# Patient Record
Sex: FEMALE | Race: WHITE | Employment: OTHER | URBAN - METROPOLITAN AREA
[De-identification: names, ages, dates, MRNs, and addresses within clinical notes are randomized per-mention and may not be internally consistent; named-entity substitution may affect disease eponyms.]

---

## 2017-02-08 ENCOUNTER — OFFICE VISIT (OUTPATIENT)
Dept: CARDIOLOGY | Facility: CLINIC | Age: 67
End: 2017-02-08
Attending: INTERNAL MEDICINE
Payer: COMMERCIAL

## 2017-02-08 VITALS
DIASTOLIC BLOOD PRESSURE: 76 MMHG | HEIGHT: 66 IN | WEIGHT: 273.3 LBS | SYSTOLIC BLOOD PRESSURE: 106 MMHG | BODY MASS INDEX: 43.92 KG/M2 | HEART RATE: 68 BPM

## 2017-02-08 DIAGNOSIS — E66.01 MORBID OBESITY, UNSPECIFIED OBESITY TYPE (H): ICD-10-CM

## 2017-02-08 DIAGNOSIS — I25.10 CORONARY ARTERY DISEASE INVOLVING NATIVE CORONARY ARTERY OF NATIVE HEART WITHOUT ANGINA PECTORIS: ICD-10-CM

## 2017-02-08 DIAGNOSIS — E78.2 MIXED HYPERLIPIDEMIA: ICD-10-CM

## 2017-02-08 DIAGNOSIS — E78.5 HYPERLIPIDEMIA LDL GOAL <70: ICD-10-CM

## 2017-02-08 DIAGNOSIS — Z98.890 S/P CAROTID ENDARTERECTOMY: Primary | ICD-10-CM

## 2017-02-08 LAB
CHOLEST SERPL-MCNC: 180 MG/DL
HDLC SERPL-MCNC: 54 MG/DL
LDLC SERPL CALC-MCNC: 83 MG/DL
NONHDLC SERPL-MCNC: 126 MG/DL
TRIGL SERPL-MCNC: 217 MG/DL

## 2017-02-08 PROCEDURE — 36415 COLL VENOUS BLD VENIPUNCTURE: CPT | Performed by: INTERNAL MEDICINE

## 2017-02-08 PROCEDURE — 80061 LIPID PANEL: CPT | Performed by: INTERNAL MEDICINE

## 2017-02-08 PROCEDURE — 99214 OFFICE O/P EST MOD 30 MIN: CPT | Performed by: INTERNAL MEDICINE

## 2017-02-08 NOTE — MR AVS SNAPSHOT
"              After Visit Summary   2/8/2017    Calista Atkinson    MRN: [id number]           Patient Information     Date Of Birth          1950        Visit Information        Provider Department      2/8/2017 10:15 AM Sully Haney DO [facility]        Today's Diagnoses     Hyperlipidemia LDL goal <70         Coronary artery disease involving native coronary artery of native heart without angina pectoris            Follow-ups after your visit        Who to contact     If you have questions or need follow up information about today's clinic visit or your schedule please contact [facility] directly at [phone].  Normal or non-critical lab and imaging results will be communicated to you by MyChart, letter or phone within 4 business days after the clinic has received the results. If you do not hear from us within 7 days, please contact the clinic through [employer] or phone. If you have a critical or abnormal lab result, we will notify you by phone as soon as possible.  Submit refill requests through DLS or call your pharmacy and they will forward the refill request to us. Please allow 3 business days for your refill to be completed.          Additional Information About Your Visit        MyChart Information     DLS gives you secure access to your electronic health record. If you see a primary care provider, you can also send messages to your care team and make appointments. If you have questions, please call your primary care clinic.  If you do not have a primary care provider, please call [phone] and they will assist you.        Care EveryWhere ID     This is your Care EveryWhere ID. This could be used by other organizations to access your [place] medical records  [id number]        Your Vitals Were     Pulse Height BMI (Body Mass Index)             68 1.664 m (5' 5.5\") 44.77 kg/m2          Blood " Pressure from Last 3 Encounters:   02/08/17 106/76   10/27/16 122/76   02/02/16 110/74    Weight from Last 3 Encounters:   02/08/17 123.968 kg (273 lb 4.8 oz)   10/27/16 124.286 kg (274 lb)   02/02/16 124.286 kg (274 lb)              We Performed the Following     Follow-Up with Cardiologist          Today's Medication Changes          These changes are accurate as of: 2/8/17 10:30 AM.  If you have any questions, ask your nurse or doctor.               These medicines have changed or have updated prescriptions.        Dose/Directions    furosemide 40 MG tablet   Commonly known as:  LASIX   This may have changed:    - when to take this  - reasons to take this  - additional instructions   Used for:  HTN (hypertension), CAD (coronary artery disease)        Dose:  40 mg   Take 1 tablet (40 mg) by mouth daily Some days patient takes (2) tabs.   Quantity:  30 tablet   Refills:  1                Primary Care Provider Office Phone # Fax #    Twyla Edna Nick [phone]       [place] [address]  [address]   [facility] [address]        Thank you!     Thank you for choosing [facility] [place]  for your care. Our goal is always to provide you with excellent care. Hearing back from our patients is one way we can continue to improve our services. Please take a few minutes to complete the written survey that you may receive in the mail after your visit with us. Thank you!             Your Updated Medication List - Protect others around you: Learn how to safely use, store and throw away your medicines at www.disposemymeds.org.          This list is accurate as of: 2/8/17 10:30 AM.  Always use your most recent med list.                   Brand Name Dispense Instructions for use    albuterol (2.5 MG/3ML) 0.083% neb solution      Take 1 vial by nebulization every 6 hours as needed for shortness of breath / dyspnea or wheezing       aspirin 81 MG tablet      Take 81  mg by mouth daily       atorvastatin 40 MG tablet    LIPITOR    90 tablet    Take 1 tablet (40 mg) by mouth daily       furosemide 40 MG tablet    LASIX    30 tablet    Take 1 tablet (40 mg) by mouth daily Some days patient takes (2) tabs.       IBUPROFEN IB OR      Take 1,000 mg by mouth daily as needed       lisinopril-hydrochlorothiazide 20-12.5 MG per tablet    PRINZIDE/ZESTORETIC    30 tablet    Take 1 tablet by mouth daily       metoprolol 25 MG tablet    LOPRESSOR    60 tablet    Take 1 tablet (25 mg) by mouth 2 times daily       omeprazole 40 MG capsule    priLOSEC    30 capsule    Take 1 capsule (40 mg) by mouth daily

## 2017-02-08 NOTE — PROGRESS NOTES
HPI and Plan:   See dictation    Orders Placed This Encounter   Procedures     Follow-Up with Cardiologist       No orders of the defined types were placed in this encounter.       Medications Discontinued During This Encounter   Medication Reason     buPROPion (WELLBUTRIN XL) 150 MG 24 hr tablet Not filled/taken by Patient     Cholecalciferol (VITAMIN D) 2000 UNITS CAPS Stopped by Patient         Encounter Diagnoses   Name Primary?     Hyperlipidemia LDL goal <70      Coronary artery disease involving native coronary artery of native heart without angina pectoris      S/P carotid endarterectomy Yes     Morbid obesity, unspecified obesity type (H)        CURRENT MEDICATIONS:  Current Outpatient Prescriptions   Medication Sig Dispense Refill     omeprazole (PRILOSEC) 40 MG capsule Take 1 capsule (40 mg) by mouth daily 30 capsule 3     atorvastatin (LIPITOR) 40 MG tablet Take 1 tablet (40 mg) by mouth daily 90 tablet 2     metoprolol (LOPRESSOR) 25 MG tablet Take 1 tablet (25 mg) by mouth 2 times daily 60 tablet 5     lisinopril-hydrochlorothiazide (PRINZIDE,ZESTORETIC) 20-12.5 MG per tablet Take 1 tablet by mouth daily 30 tablet 10     IBUPROFEN IB OR Take 1,000 mg by mouth daily as needed       furosemide (LASIX) 40 MG tablet Take 1 tablet (40 mg) by mouth daily Some days patient takes (2) tabs. (Patient taking differently: Take 40 mg by mouth as needed (Pt taking on a  PRN basis) Some days patient takes (2) tabs.) 30 tablet 1     aspirin 81 MG tablet Take 81 mg by mouth daily       albuterol (2.5 MG/3ML) 0.083% nebulizer solution Take 1 vial by nebulization every 6 hours as needed for shortness of breath / dyspnea or wheezing         ALLERGIES   No Known Allergies    PAST MEDICAL HISTORY:  Past Medical History   Diagnosis Date     COPD (chronic obstructive pulmonary disease) (H)      Hypertension      Gastro-oesophageal reflux disease      Myocardial infarction (H) 2012     Cellulitis      Hyperlipidemia      CAD  "(coronary artery disease) 6/2012     STEMI and stent      CVA (cerebral infarction) 2012     Carotid artery plaque 1/9/2015     S/P carotid endarterectomy 1/9/2015       PAST SURGICAL HISTORY:  Past Surgical History   Procedure Laterality Date     Angiogram  6/6/12     thrombectomy and stent RCA     Tonsillectomy       age 9     Endarterectomy carotid Left 3/27/12     Heart cath left heart cath  03/02/15     Evidence of old MI, Continued good results of the stent in mid RCA with mild disease elsewhere in the RCA, Disease in very small branch of the distal CFX.        FAMILY HISTORY:  Family History   Problem Relation Age of Onset     Myocardial Infarction Mother      Prostate Cancer Father      CANCER Father      Lymphnode     Cancer - colorectal Paternal Grandfather      Alcohol/Drug Son      Alcoholic     Myocardial Infarction Brother      Respiratory Brother      Pneumonia        SOCIAL HISTORY:  Social History     Social History     Marital Status:      Spouse Name: N/A     Number of Children: N/A     Years of Education: N/A     Social History Main Topics     Smoking status: Current Every Day Smoker     Smokeless tobacco: Never Used      Comment: 4-5 cig daily.      Alcohol Use: No     Drug Use: No     Sexual Activity: No     Other Topics Concern     Caffeine Concern Yes     2-3 cups daily     Sleep Concern Yes     off and on sleeping     Special Diet No     Exercise No     Social History Narrative       Review of Systems:  Skin:  Negative       Eyes:  Positive for glasses    ENT:  Negative      Respiratory:  Positive for shortness of breath;dyspnea on exertion;cough SOB with humidity, and with exertion; coughing due to a cold   Cardiovascular:    Positive for;lightheadedness;fatigue   \"I get lightheaded once in a while in the morning\" - goes away withing 1 min.  Gastroenterology: Positive for reflux;heartburn;excessive gas or bloating heartburn/ reflux under control with RX - per pt  Genitourinary:  " "Positive for nocturia;incontinence;urgency 2-3x per night  Musculoskeletal:  Positive for back pain;nocturnal cramping \"way down in lower back\" , hand and toe cramps occasionally  Neurologic:  Negative      Psychiatric:  Positive for sleep disturbances    Heme/Lymph/Imm:  Positive for night sweats    Endocrine:  Negative        Physical Exam:  Vitals: /76 mmHg  Pulse 68  Ht 1.664 m (5' 5.5\")  Wt 123.968 kg (273 lb 4.8 oz)  BMI 44.77 kg/m2    Constitutional:  cooperative;in no acute distress morbidly obese      Skin:  warm and dry to the touch        Head:  normocephalic        Eyes:  pupils equal and round        ENT:  no pallor or cyanosis        Neck:    bilateral carotid bruit      Chest:  normal symmetry bronchovesicular breath sounds;diminished breath sounds bilaterally        Cardiac: regular rhythm;no murmurs, gallops or rubs detected;normal S1 and S2   distant heart sounds              Abdomen:  abdomen soft;no bruits obese      Vascular:   pulses below the femoral arteries are diminished                                      Extremities and Back:  no deformities, clubbing, cyanosis, erythema observed   1+;bilateral LE edema          Neurological:  no gross motor deficits;affect appropriate, oriented to time, person and place              CC  Sully Haney, DO   [facility]  [address]  [place] [address]                    "

## 2017-02-08 NOTE — PROGRESS NOTES
HISTORY OF PRESENT ILLNESS:  Ms. Atkinson is a pleasant 66-year-old female with a history of coronary disease, previous inferior wall myocardial infarction, peripheral arterial disease with a history of left carotid endarterectomy and ongoing tobacco abuse.  She is suffering from upper respiratory viral illness, currently with productive cough and generalized malaise; that has been ongoing for well for a week now.      From a cardiac perspective; however, she is denying any chest pain symptoms.  Her breathing has actually been really good.  She is not experiencing any symptoms suggestive of orthopnea or PND.  She does continue to smoke up to 5 cigarettes per day.  We did discuss smoking cessation once again with her today.        She had a cholesterol profile drawn today showing continued reduction in her overall cholesterol with moderate intensity Lipitor.  Total cholesterol today was 180, HDL 54, LDL 83, previously had been 109 and triglycerides are still elevated to 217, but down from 248.  She seems to tolerate Lipitor well without any side effect.      PHYSICAL EXAMINATION:  Today her blood pressure is 106/76, pulse is 68.  Weight 273.  This has been stable over the past year.  Body mass index of 44.  Cardiovascular tones are regular.  I do not appreciate a murmur, gallop or rub.  She does have some coarse rhonchi noted in the upper airways anteriorly and diminished in the bases posteriorly.  No peripheral edema is noted on exam today.      In summary, Ms. Atkinson is a pleasant 66-year-old female with a history of coronary disease, previous inferior wall myocardial infarction with preserved LV systolic function, peripheral arterial disease with left carotid endarterectomy and ongoing tobacco abuse.  She is suffering from bronchitis currently, but from a cardiac perspective, she is doing well without chest pain symptoms or dyspnea with exertion.  She is tolerating moderate intensity Lipitor with significant  improvement in her cholesterol numbers.  Her blood pressure is under good control.  We talked about smoking cessation once again today.  She did not need any refills.  I am happy to see her back on an annual basis or as needed.  Please feel free to contact me with any questions you have in regards to her care.      cc:      Twyla Nick MD    [facility]   [address] [place] [address]         LEXIS ELAM DO             D: 2017 10:32   T: 2017 15:40   MT: SHAHBAZ      Name:     GLENIS HENDRICKSON   MRN:      [id number]        Account:      [id number]   :      1950           Service Date: 2017      Document: [id number]

## 2017-02-08 NOTE — LETTER
2/8/2017    Twyla Nick MD  [facility]   [address]  [place] [address]    RE: Calista OMAR Atkinson       Dear Colleague,    I had the pleasure of seeing Calista Carode in the [facility].    Ms. Atkinson is a pleasant 66-year-old female with a history of coronary disease, previous inferior wall myocardial infarction, peripheral arterial disease with a history of left carotid endarterectomy and ongoing tobacco abuse.  She is suffering from upper respiratory viral illness, currently with productive cough and generalized malaise; that has been ongoing for well for a week now.      From a cardiac perspective; however, she is denying any chest pain symptoms.  Her breathing has actually been really good.  She is not experiencing any symptoms suggestive of orthopnea or PND.  She does continue to smoke up to 5 cigarettes per day.  We did discuss smoking cessation once again with her today.        She had a cholesterol profile drawn today showing continued reduction in her overall cholesterol with moderate intensity Lipitor.  Total cholesterol today was 180, HDL 54, LDL 83, previously had been 109 and triglycerides are still elevated to 217, but down from 248.  She seems to tolerate Lipitor well without any side effect.      PHYSICAL EXAMINATION:  Today her blood pressure is 106/76, pulse is 68.  Weight 273.  This has been stable over the past year.  Body mass index of 44.  Cardiovascular tones are regular.  I do not appreciate a murmur, gallop or rub.  She does have some coarse rhonchi noted in the upper airways anteriorly and diminished in the bases posteriorly.  No peripheral edema is noted on exam today.      In summary, Ms. Atkinson is a pleasant 66-year-old female with a history of coronary disease, previous inferior wall myocardial infarction with preserved LV systolic function, peripheral arterial disease with left carotid endarterectomy and ongoing tobacco abuse.   She is suffering from bronchitis currently, but from a cardiac perspective, she is doing well without chest pain symptoms or dyspnea with exertion.  She is tolerating moderate intensity Lipitor with significant improvement in her cholesterol numbers.  Her blood pressure is under good control.  We talked about smoking cessation once again today.  She did not need any refills.  I am happy to see her back on an annual basis or as needed.  Please feel free to contact me with any questions you have in regards to her care.      Again, thank you for allowing me to participate in the care of your patient.      Sincerely,    Sully Haney, DO     [facility]

## 2017-02-24 DIAGNOSIS — K21.9 GASTROESOPHAGEAL REFLUX DISEASE WITHOUT ESOPHAGITIS: ICD-10-CM

## 2017-02-24 RX ORDER — OMEPRAZOLE 40 MG/1
40 CAPSULE, DELAYED RELEASE ORAL DAILY
Qty: 30 CAPSULE | Refills: 0 | Status: SHIPPED | OUTPATIENT
Start: 2017-02-24 | End: 2017-03-03

## 2017-02-24 NOTE — TELEPHONE ENCOUNTER
Medication is being filled for 1 time refill only due to:  Patient needs to be seen because it has been more than one year since last visit. Spoke with patient and appointment scheduled with Dr. Rose 3/3/2017.   Aide Fair RN

## 2017-02-24 NOTE — TELEPHONE ENCOUNTER
omeprazole (PRILOSEC) 40 MG capsule  Last Written Prescription Date: 9/21/16  Last Fill Quantity: 30,  # refills: 3   Last Office Visit with FMOSWALD, PEGGYP or [facility] prescribing provider:  1/12/2016                                              DINESH Armijo

## 2017-03-03 ENCOUNTER — OFFICE VISIT (OUTPATIENT)
Dept: FAMILY MEDICINE | Facility: CLINIC | Age: 67
End: 2017-03-03
Payer: COMMERCIAL

## 2017-03-03 VITALS
TEMPERATURE: 98.1 F | DIASTOLIC BLOOD PRESSURE: 80 MMHG | RESPIRATION RATE: 16 BRPM | HEART RATE: 69 BPM | WEIGHT: 275.2 LBS | BODY MASS INDEX: 45.1 KG/M2 | SYSTOLIC BLOOD PRESSURE: 104 MMHG | OXYGEN SATURATION: 95 %

## 2017-03-03 DIAGNOSIS — Z78.0 ASYMPTOMATIC POSTMENOPAUSAL STATUS: ICD-10-CM

## 2017-03-03 DIAGNOSIS — Z23 NEED FOR INFLUENZA VACCINATION: ICD-10-CM

## 2017-03-03 DIAGNOSIS — F17.200 NEEDS SMOKING CESSATION EDUCATION: ICD-10-CM

## 2017-03-03 DIAGNOSIS — Z12.11 SCREEN FOR COLON CANCER: ICD-10-CM

## 2017-03-03 DIAGNOSIS — Z23 NEED FOR PROPHYLACTIC VACCINATION AGAINST STREPTOCOCCUS PNEUMONIAE (PNEUMOCOCCUS): ICD-10-CM

## 2017-03-03 DIAGNOSIS — J44.9 CHRONIC OBSTRUCTIVE PULMONARY DISEASE, UNSPECIFIED COPD TYPE (H): Primary | ICD-10-CM

## 2017-03-03 DIAGNOSIS — Z11.59 NEED FOR HEPATITIS C SCREENING TEST: ICD-10-CM

## 2017-03-03 DIAGNOSIS — R53.83 FATIGUE, UNSPECIFIED TYPE: ICD-10-CM

## 2017-03-03 DIAGNOSIS — K21.9 GASTROESOPHAGEAL REFLUX DISEASE WITHOUT ESOPHAGITIS: ICD-10-CM

## 2017-03-03 DIAGNOSIS — E55.9 VITAMIN D DEFICIENCY: ICD-10-CM

## 2017-03-03 LAB
FEF 25/75: 1.61
FEV-1: 1.87
FEV1/FVC: NORMAL
FVC: 2.48

## 2017-03-03 PROCEDURE — G0009 ADMIN PNEUMOCOCCAL VACCINE: HCPCS | Performed by: FAMILY MEDICINE

## 2017-03-03 PROCEDURE — 82306 VITAMIN D 25 HYDROXY: CPT | Performed by: FAMILY MEDICINE

## 2017-03-03 PROCEDURE — 36415 COLL VENOUS BLD VENIPUNCTURE: CPT | Performed by: FAMILY MEDICINE

## 2017-03-03 PROCEDURE — 99214 OFFICE O/P EST MOD 30 MIN: CPT | Mod: 25 | Performed by: FAMILY MEDICINE

## 2017-03-03 PROCEDURE — 90732 PPSV23 VACC 2 YRS+ SUBQ/IM: CPT | Performed by: FAMILY MEDICINE

## 2017-03-03 PROCEDURE — 90662 IIV NO PRSV INCREASED AG IM: CPT | Performed by: FAMILY MEDICINE

## 2017-03-03 PROCEDURE — 86803 HEPATITIS C AB TEST: CPT | Performed by: FAMILY MEDICINE

## 2017-03-03 PROCEDURE — 94010 BREATHING CAPACITY TEST: CPT | Performed by: FAMILY MEDICINE

## 2017-03-03 RX ORDER — ALBUTEROL SULFATE 90 UG/1
2 AEROSOL, METERED RESPIRATORY (INHALATION) EVERY 6 HOURS PRN
Qty: 1 INHALER | Refills: 3 | Status: SHIPPED | OUTPATIENT
Start: 2017-03-03

## 2017-03-03 RX ORDER — OMEPRAZOLE 40 MG/1
40 CAPSULE, DELAYED RELEASE ORAL DAILY
Qty: 90 CAPSULE | Refills: 3 | Status: SHIPPED | OUTPATIENT
Start: 2017-03-03 | End: 2018-03-09

## 2017-03-03 RX ORDER — ALBUTEROL SULFATE 0.83 MG/ML
1 SOLUTION RESPIRATORY (INHALATION) EVERY 6 HOURS PRN
Qty: 360 ML | Refills: 0 | Status: SHIPPED | OUTPATIENT
Start: 2017-03-03

## 2017-03-03 RX ORDER — TIOTROPIUM BROMIDE 18 UG/1
CAPSULE ORAL; RESPIRATORY (INHALATION)
Qty: 30 CAPSULE | Refills: 3 | Status: SHIPPED | OUTPATIENT
Start: 2017-03-03 | End: 2018-01-16 | Stop reason: ALTCHOICE

## 2017-03-03 ASSESSMENT — PAIN SCALES - GENERAL: PAINLEVEL: NO PAIN (0)

## 2017-03-03 NOTE — MR AVS SNAPSHOT
After Visit Summary   3/3/2017    Calista Atkinson    MRN: [id number]           Patient Information     Date Of Birth          1950        Visit Information        Provider Department      3/3/2017 9:20 AM Twyla Nick MD [facility]        Today's Diagnoses     Chronic obstructive pulmonary disease, unspecified COPD type (H)    -  1    Gastroesophageal reflux disease without esophagitis        Fatigue, unspecified type        Screen for colon cancer        Asymptomatic postmenopausal status        Need for hepatitis C screening test        Need for prophylactic vaccination against Streptococcus pneumoniae (pneumococcus)        Needs smoking cessation education        Need for influenza vaccination        Vitamin D deficiency           Follow-ups after your visit        Who to contact     If you have questions or need follow up information about today's clinic visit or your schedule please contact [facility] directly at [phone].  Normal or non-critical lab and imaging results will be communicated to you by MyChart, letter or phone within 4 business days after the clinic has received the results. If you do not hear from us within 7 days, please contact the clinic through MyChart or phone. If you have a critical or abnormal lab result, we will notify you by phone as soon as possible.  Submit refill requests through [employer] or call your pharmacy and they will forward the refill request to us. Please allow 3 business days for your refill to be completed.          Additional Information About Your Visit        MyChart Information     [employer] gives you secure access to your electronic health record. If you see a primary care provider, you can also send messages to your care team and make appointments. If you have questions, please call your primary care clinic.  If you do not have a primary care provider, please call [phone] and they will assist you.         Care EveryWhere ID     This is your Care EveryWhere ID. This could be used by other organizations to access your [place] medical records  [id number]        Your Vitals Were     Pulse Temperature Respirations Pulse Oximetry BMI (Body Mass Index)       69 98.1  F (36.7  C) (Oral) 16 95% 45.1 kg/m2        Blood Pressure from Last 3 Encounters:   03/03/17 104/80   02/08/17 106/76   10/27/16 122/76    Weight from Last 3 Encounters:   03/03/17 275 lb 3.2 oz (124.8 kg)   02/08/17 273 lb 4.8 oz (124 kg)   10/27/16 274 lb (124.3 kg)              We Performed the Following     CBC with platelets     Comprehensive metabolic panel     FLU VACCINE, INCREASED ANTIGEN, PRESV FREE     Hepatitis C Screen Reflex to HCV RNA Quant and Genotype     Magnesium     PNEUMOCOCCAL VACCINE,ADULT,SQ OR IM     Spirometry, Breathing Capacity: Normal Order, Clinic Performed     Vitamin B12     Vitamin D Deficiency          Today's Medication Changes          These changes are accurate as of: 3/3/17 11:59 PM.  If you have any questions, ask your nurse or doctor.               Start taking these medicines.        Dose/Directions    tiotropium 18 MCG capsule   Commonly known as:  SPIRIVA HANDIHALER   Used for:  Chronic obstructive pulmonary disease, unspecified COPD type (H)   Started by:  Twyla Nick MD        Inhale contents of one capsule daily.   Quantity:  30 capsule   Refills:  3         These medicines have changed or have updated prescriptions.        Dose/Directions    * albuterol (2.5 MG/3ML) 0.083% neb solution   This may have changed:  Another medication with the same name was added. Make sure you understand how and when to take each.   Used for:  Chronic obstructive pulmonary disease, unspecified COPD type (H)   Changed by:  Twyla Nick MD        Dose:  1 vial   Take 1 vial (2.5 mg) by nebulization every 6 hours as needed for shortness of breath / dyspnea or wheezing   Quantity:  360 mL   Refills:  0        * albuterol 108 (90 BASE) MCG/ACT Inhaler   Commonly known as:  PROAIR HFA/PROVENTIL HFA/VENTOLIN HFA   This may have changed:  You were already taking a medication with the same name, and this prescription was added. Make sure you understand how and when to take each.   Used for:  Chronic obstructive pulmonary disease, unspecified COPD type (H)   Changed by:  Twyla Nick MD        Dose:  2 puff   Inhale 2 puffs into the lungs every 6 hours as needed for shortness of breath / dyspnea or wheezing   Quantity:  1 Inhaler   Refills:  3       furosemide 40 MG tablet   Commonly known as:  LASIX   This may have changed:    - when to take this  - reasons to take this  - additional instructions   Used for:  HTN (hypertension), CAD (coronary artery disease)        Dose:  40 mg   Take 1 tablet (40 mg) by mouth daily Some days patient takes (2) tabs.   Quantity:  30 tablet   Refills:  1       * Notice:  This list has 2 medication(s) that are the same as other medications prescribed for you. Read the directions carefully, and ask your doctor or other care provider to review them with you.         Where to get your medicines      These medications were sent to [facility] [address] [place] [address] [facility]  [address] [place] [address]     Phone:  [phone]     albuterol (2.5 MG/3ML) 0.083% neb solution    albuterol 108 (90 BASE) MCG/ACT Inhaler    omeprazole 40 MG capsule    tiotropium 18 MCG capsule                Primary Care Provider Office Phone # Fax #    Twyla Nick [phone]       [facility] [address]  [address]   [facility] [address]        Thank you!     Thank you for choosing [facility]  for your care. Our goal is always to provide you with excellent care. Hearing back from our patients is one way we can continue to improve our services. Please take a few minutes to complete the written survey that you may receive in the mail  after your visit with us. Thank you!             Your Updated Medication List - Protect others around you: Learn how to safely use, store and throw away your medicines at www.disposemymeds.org.          This list is accurate as of: 3/3/17 11:59 PM.  Always use your most recent med list.                   Brand Name Dispense Instructions for use    * albuterol (2.5 MG/3ML) 0.083% neb solution     360 mL    Take 1 vial (2.5 mg) by nebulization every 6 hours as needed for shortness of breath / dyspnea or wheezing       * albuterol 108 (90 BASE) MCG/ACT Inhaler    PROAIR HFA/PROVENTIL HFA/VENTOLIN HFA    1 Inhaler    Inhale 2 puffs into the lungs every 6 hours as needed for shortness of breath / dyspnea or wheezing       aspirin 81 MG tablet      Take 81 mg by mouth daily       atorvastatin 40 MG tablet    LIPITOR    90 tablet    Take 1 tablet (40 mg) by mouth daily       furosemide 40 MG tablet    LASIX    30 tablet    Take 1 tablet (40 mg) by mouth daily Some days patient takes (2) tabs.       IBUPROFEN IB OR      Take 1,000 mg by mouth daily as needed       lisinopril-hydrochlorothiazide 20-12.5 MG per tablet    PRINZIDE/ZESTORETIC    30 tablet    Take 1 tablet by mouth daily       metoprolol 25 MG tablet    LOPRESSOR    60 tablet    Take 1 tablet (25 mg) by mouth 2 times daily       omeprazole 40 MG capsule    priLOSEC    90 capsule    Take 1 capsule (40 mg) by mouth daily       tiotropium 18 MCG capsule    SPIRIVA HANDIHALER    30 capsule    Inhale contents of one capsule daily.       * Notice:  This list has 2 medication(s) that are the same as other medications prescribed for you. Read the directions carefully, and ask your doctor or other care provider to review them with you.

## 2017-03-03 NOTE — NURSING NOTE
"Chief Complaint   Patient presents with     Recheck Medication     prilosec     Gastrointestinal Problem     COPD     Initial /80 (BP Location: Right arm, Patient Position: Chair, Cuff Size: Adult Regular)  Pulse 69  Temp 98.1  F (36.7  C) (Oral)  Resp 16  Wt 275 lb 3.2 oz (124.8 kg)  SpO2 95%  BMI 45.1 kg/m2 Estimated body mass index is 45.1 kg/(m^2) as calculated from the following:    Height as of 2/8/17: 5' 5.5\" (1.664 m).    Weight as of this encounter: 275 lb 3.2 oz (124.8 kg).  BP completed using cuff size regular right arm.    Lisa Magill, CMA    "

## 2017-03-03 NOTE — NURSING NOTE
Screening Questionnaire for Adult Immunization    Are you sick today?   No   Do you have allergies to medications, food, a vaccine component or latex?   No   Have you ever had a serious reaction after receiving a vaccination?   No   Do you have a long-term health problem with heart disease, lung disease, asthma, kidney disease, metabolic disease (e.g. diabetes), anemia, or other blood disorder?   Yes-had a stroke and heart attack in the past.  Has COPD.    Do you have cancer, leukemia, HIV/AIDS, or any other immune system problem?   No   In the past 3 months, have you taken medications that affect  your immune system, such as prednisone, other steroids, or anticancer drugs; drugs for the treatment of rheumatoid arthritis, Crohn s disease, or psoriasis; or have you had radiation treatments?   No   Have you had a seizure, or a brain or other nervous system problem?   No   During the past year, have you received a transfusion of blood or blood     products, or been given immune (gamma) globulin or antiviral drug?   No   For women: Are you pregnant or is there a chance you could become        pregnant during the next month?   No   Have you received any vaccinations in the past 4 weeks?   No     Immunization questionnaire was positive for at least one answer.  Notified Sandoval.      MNVFC doesn't apply on this patient    Per orders of Dr. Nick, injection of pneumonia 23 given by Lisa A. Magill. Patient instructed to remain in clinic for 20 minutes afterwards, and to report any adverse reaction to me immediately.       Screening performed by Lisa A. Magill on 3/3/2017 at 10:39 AM.

## 2017-03-03 NOTE — PROGRESS NOTES
HPI   SUBJECTIVE:                                                    Calista Atkinson is a 66 year old female who presents to clinic today for the following health issues:      Hypertension Follow-up      Outpatient blood pressures are not being checked.    Low Salt Diet: no added salt    BP Readings from Last 6 Encounters:   17 104/80   17 106/76   10/27/16 122/76   16 110/74   16 100/80   06/18/15 118/68     Is still on the same lisinopril from when she initially started on BP medication. Does note of some dizziness and wooziness for a minute for two in the morning, but will most often go away quickly.      COPD Follow-Up    Symptoms are currently: improved    Current fatigue or dyspnea with ambulation: stable     Shortness of breath: stable    Increased or change in Cough/Sputum: Yes-  Just got over a cold, so still has a cough     Fever(s): Yes-  A couple weeks ago    Baseline ambulation without stopping to rest 1-2 feet, blocks. Able to walk up 1 flights of stairs without stopping to rest.    Any ER/UC or hospital admissions since your last visit? No     History   Smoking Status     Current Every Day Smoker   Smokeless Tobacco     Never Used     Comment: 4-5 cig daily.      No results found for: FEV1, AXM3SUX    Recently getting over cold. Does have a nebulizer at home, thinks albuterol may be . Has not been filled for two years. Still smoking, down from 1.5 pack to 4 cigarettes a day. Slowly trying to wean off. Did do spirometry 2 years ago.        Amount of exercise or physical activity: None    Problems taking medications regularly: No    Medication side effects: none  Diet: regular (no restrictions)  GERD/Heartburn     Onset: YEARS     Description:     Burning in chest: no    Intensity: mild, 0/10    Progression of Symptoms: same and constant    Accompanying Signs & Symptoms:  Does it feel like food gets stuck: no  Nausea: no  Vomiting (bloody?): no  Abdominal Pain:  no  Black-Tarry stools: no:  Bloody stools: no   History:   Previous ulcers: no    Precipitating factors:   Caffeine use: YES- IF PATIENT DRINKS A LOT OF CAFFEINE.  Only drinks 3 cups a day   Alcohol use: none  NSAID/Aspirin use: YES- patient takes a daily ASA 81 mg.  1000 mg Ibuprofen twice a week for lower back pain  Tobacco use: YES  Worse with spicy foods. Acid foods     Alleviating factors:  prilosec          Therapies Tried and outcome:lifestyle change including: avoiding spicy, caffeinated, alcohol and acid foods and prilosec     Prilosec is still working. Notes that if she misses a day the next day she will notice symptoms the next day. Is taking 2000 units of vitamin D daily and also taking magnesium.      PROBLEMS TO ADD ON...  Cardiology: Stable, no issues.   Lasix: on hand, but has not had to take. Reports this has is due to decreased use of caffeine. Drinks 2 cups of coffee in the morning, one in the afternoon.       Problem list and histories reviewed & adjusted, as indicated.  Additional history: as documented    Recent Labs   Lab Test  02/08/17   0922  02/02/16   0934  06/08/15   0751  02/25/15   1410  01/09/15   1204  12/23/14   0800   LDL  83  109*  172*   --   139*   --    HDL  54  57  52   --    --    --    TRIG  217*  248*  272*   --    --    --    ALT   --    --   17   --   16  21   CR   --    --    --   0.56  0.62  0.59   GFRESTIMATED   --    --    --   >90  Non  GFR Calc    >90  Non  GFR Calc    >90  Non  GFR Calc     GFRESTBLACK   --    --    --   >90   GFR Calc    >90   GFR Calc    >90   GFR Calc     POTASSIUM   --    --    --   4.2  4.3  4.0   TSH   --    --    --    --   2.03   --       BP Readings from Last 3 Encounters:   03/03/17 104/80   02/08/17 106/76   10/27/16 122/76    Wt Readings from Last 3 Encounters:   03/03/17 124.8 kg (275 lb 3.2 oz)   02/08/17 124 kg (273 lb 4.8 oz)   10/27/16  124.3 kg (274 lb)                  Labs reviewed in EPIC    Reviewed and updated as needed this visit by clinical staff  Tobacco  Allergies  Meds  Problems  Med Hx  Surg Hx  Fam Hx  Soc Hx        Reviewed and updated as needed this visit by Provider         ROS:  Constitutional, HEENT, cardiovascular, pulmonary, gi and gu systems are negative, except as otherwise noted.    This document serves as a record of the services and decisions personally performed and made by Twyla Nick MD. It was created on her behalf by Sharon Leonard, a trained medical scribe. The creation of this document is based the provider's statements to the medical scribe.  Sharon Leonard March 3, 2017 10:06 AM    OBJECTIVE:                                                    /80 (BP Location: Right arm, Patient Position: Chair, Cuff Size: Adult Regular)  Pulse 69  Temp 98.1  F (36.7  C) (Oral)  Resp 16  Wt 124.8 kg (275 lb 3.2 oz)  SpO2 95%  BMI 45.1 kg/m2  Body mass index is 45.1 kg/(m^2).  GENERAL: healthy, alert and no distress  EYES: Eyes grossly normal to inspection, PERRL and conjunctivae and sclerae normal  HENT: ear canals and TM's normal, nose and mouth without ulcers or lesions  NECK: no adenopathy, no asymmetry, masses, or scars and thyroid normal to palpation  RESP: lungs clear to auscultation - no rales, rhonchi or wheezes  CV: regular rate and rhythm, normal S1 S2, no S3 or S4, no murmur, click or rub, no peripheral edema and peripheral pulses strong  MS: no gross musculoskeletal defects noted, no edema  SKIN: no suspicious lesions or rashes  NEURO: Normal strength and tone, mentation intact and speech normal  PSYCH: mentation appears normal, affect normal/bright    Diagnostic Test Results:  Results for orders placed or performed in visit on 03/03/17 (from the past 24 hour(s))   Spirometry, Breathing Capacity: Normal Order, Clinic Performed   Result Value Ref Range    FEV-1 1.87     FVC 2.48     FEV1/FVC 76%     FEF  25/75 1.61         ASSESSMENT/PLAN:                                                    (J44.9) Chronic obstructive pulmonary disease, unspecified COPD type (H)  (primary encounter diagnosis)  Comment: spirometry completed normal. Inhalers given. Follow up if spiriva is not covered. Also refilled nebs   Plan: albuterol (2.5 MG/3ML) 0.083% neb solution,         albuterol (PROAIR HFA/PROVENTIL HFA/VENTOLIN         HFA) 108 (90 BASE) MCG/ACT Inhaler, tiotropium         (SPIRIVA HANDIHALER) 18 MCG capsule,         Spirometry, Breathing Capacity: Normal Order,         Clinic Performed        Follow up in six months.     (K21.9) Gastroesophageal reflux disease without esophagitis  Comment: prilosec refilled. Advised going every other day or cutting in half if possible. Discussed vitamin absorption issues with long term use.   Plan: omeprazole (PRILOSEC) 40 MG capsule        Follow up in six months.     (R53.83) Fatigue, unspecified type  Comment: labs.   Plan: Magnesium, Vitamin B12, Iron and iron binding         capacity, Ferritin, CBC with platelets,         Comprehensive metabolic panel        Follow up as needed.     (Z12.11) Screen for colon cancer  Comment: screen.   Plan: Fecal colorectal cancer screen FIT - Future         (S+30)            (Z78.0) Asymptomatic postmenopausal status  Comment: screen  Plan: DEXA HIP/PELVIS/SPINE - Future          (Z11.59) Need for hepatitis C screening test  Comment: screen  Plan: Hepatitis C Screen Reflex to HCV RNA Quant and         Genotype            (Z23) Need for prophylactic vaccination against Streptococcus pneumoniae (pneumococcus)  Comment: agreed to vaccine  Plan: PNEUMOCOCCAL VACCINE,ADULT,SQ OR IM            (F17.200) Needs smoking cessation education  Comment: Declined medication help. Tapering on her own.   Plan: Follow up as needed.     (Z23) Need for influenza vaccination  Comment: agreed to flu vaccine.   Plan: NOS FLU VACC, 1 YR and > IM (Medicare), FLU          VACCINE, INCREASED ANTIGEN, PRESV FREE            (E55.9) Vitamin D deficiency  Comment: Will check vit D. Continue with 2000 units daily.  Plan: Vitamin D Deficiency        Follow up as needed        The information in this document, created by the medical scribe for me, accurately reflects the services I personally performed and the decisions made by me. I have reviewed and approved this document for accuracy prior to leaving the patient care area.  Twyla Nick MD 10:06 AM 3/3/2017          Twyla Nick MD  [facility]      Physical Exam

## 2017-03-04 LAB
DEPRECATED CALCIDIOL+CALCIFEROL SERPL-MC: 32 UG/L (ref 20–75)
HCV AB SERPL QL IA: NORMAL

## 2017-03-08 PROCEDURE — 82274 ASSAY TEST FOR BLOOD FECAL: CPT | Performed by: FAMILY MEDICINE

## 2017-03-09 DIAGNOSIS — Z12.11 SCREEN FOR COLON CANCER: ICD-10-CM

## 2017-03-09 LAB — HEMOCCULT STL QL IA: NEGATIVE

## 2017-03-24 DIAGNOSIS — I10 HTN (HYPERTENSION): Primary | ICD-10-CM

## 2017-03-24 RX ORDER — LISINOPRIL AND HYDROCHLOROTHIAZIDE 12.5; 2 MG/1; MG/1
1 TABLET ORAL DAILY
Qty: 30 TABLET | Refills: 0 | Status: SHIPPED | OUTPATIENT
Start: 2017-03-24 | End: 2017-05-04

## 2017-03-24 NOTE — TELEPHONE ENCOUNTER
lisinopril-hydrochlorothiazide (PRINZIDE,ZESTORETIC) 20-12.5 MG per tablet      Last Written Prescription Date: 2/17/16  Last Fill Quantity: 30, # refills: 10  Last Office Visit with G, [facility] or [facility] prescribing provider: 3/3/2017         Potassium   Date Value Ref Range Status   02/25/2015 4.2 3.4 - 5.3 mmol/L Final     Creatinine   Date Value Ref Range Status   02/25/2015 0.56 0.52 - 1.04 mg/dL Final     BP Readings from Last 3 Encounters:   03/03/17 104/80   02/08/17 106/76   10/27/16 122/76         DINESH Armijo  March 24, 2017  8:50 AM

## 2017-03-24 NOTE — LETTER
Clinics[facility]  March 24, 2017      [place] [address]           Phone :  [phone]          Fax:  [phone]  Calista OMAR Atkinson  [address] [place] [id number]      Dear Calista,    We recently received a call from your pharmacy requesting a refill of your medication-Lisinopril/HCTZ.    A review of your chart indicates that an appointment is required with the lab for yearly blood pressure blood work. Please call the clinic at [phone] to schedule your lab only appointment.    We have authorized one refill of your medication to allow time for you to schedule your appointment.    Taking care of your health is important to us, and ongoing visits with your provider are vital to your care.  We look forward to seeing you in the near future.        Sincerely,        Twyla Nick MD / Aide Fair RN

## 2017-03-24 NOTE — TELEPHONE ENCOUNTER
Medication is being filled for 1 time refill only due to:  Patient needs labs potassium and creatinine. Letter sent to patient.   Aide Fair RN

## 2017-04-10 DIAGNOSIS — I10 ESSENTIAL HYPERTENSION: ICD-10-CM

## 2017-04-10 NOTE — TELEPHONE ENCOUNTER
metoprolol (LOPRESSOR) 25 MG tablet      Last Written Prescription Date: 4/21/16  Last Fill Quantity: 60, # refills: 5    Last Office Visit with G, P or [facility] prescribing provider:  3/3/2017     Future Office Visit:        BP Readings from Last 3 Encounters:   03/03/17 104/80   02/08/17 106/76   10/27/16 122/76       DINESH Armijo  April 10, 2017  9:51 AM

## 2017-04-11 RX ORDER — METOPROLOL TARTRATE 25 MG/1
25 TABLET, FILM COATED ORAL 2 TIMES DAILY
Qty: 60 TABLET | Refills: 5 | Status: SHIPPED | OUTPATIENT
Start: 2017-04-11 | End: 2017-09-26

## 2017-04-18 DIAGNOSIS — R53.83 FATIGUE, UNSPECIFIED TYPE: ICD-10-CM

## 2017-04-18 LAB
ALBUMIN SERPL-MCNC: 3.4 G/DL (ref 3.4–5)
ALP SERPL-CCNC: 88 U/L (ref 40–150)
ALT SERPL W P-5'-P-CCNC: 14 U/L (ref 0–50)
ANION GAP SERPL CALCULATED.3IONS-SCNC: 8 MMOL/L (ref 3–14)
AST SERPL W P-5'-P-CCNC: 12 U/L (ref 0–45)
BILIRUB SERPL-MCNC: 0.8 MG/DL (ref 0.2–1.3)
BUN SERPL-MCNC: 11 MG/DL (ref 7–30)
CALCIUM SERPL-MCNC: 9.1 MG/DL (ref 8.5–10.1)
CHLORIDE SERPL-SCNC: 103 MMOL/L (ref 94–109)
CO2 SERPL-SCNC: 29 MMOL/L (ref 20–32)
CREAT SERPL-MCNC: 0.66 MG/DL (ref 0.52–1.04)
ERYTHROCYTE [DISTWIDTH] IN BLOOD BY AUTOMATED COUNT: 13 % (ref 10–15)
GFR SERPL CREATININE-BSD FRML MDRD: 89 ML/MIN/1.7M2
GLUCOSE SERPL-MCNC: 89 MG/DL (ref 70–99)
HCT VFR BLD AUTO: 44.1 % (ref 35–47)
HGB BLD-MCNC: 15.2 G/DL (ref 11.7–15.7)
MAGNESIUM SERPL-MCNC: 2.2 MG/DL (ref 1.6–2.3)
MCH RBC QN AUTO: 30.3 PG (ref 26.5–33)
MCHC RBC AUTO-ENTMCNC: 34.5 G/DL (ref 31.5–36.5)
MCV RBC AUTO: 88 FL (ref 78–100)
PLATELET # BLD AUTO: 180 10E9/L (ref 150–450)
POTASSIUM SERPL-SCNC: 4 MMOL/L (ref 3.4–5.3)
PROT SERPL-MCNC: 6.7 G/DL (ref 6.8–8.8)
RBC # BLD AUTO: 5.02 10E12/L (ref 3.8–5.2)
SODIUM SERPL-SCNC: 140 MMOL/L (ref 133–144)
VIT B12 SERPL-MCNC: 368 PG/ML (ref 193–986)
WBC # BLD AUTO: 7.1 10E9/L (ref 4–11)

## 2017-04-18 PROCEDURE — 83735 ASSAY OF MAGNESIUM: CPT | Performed by: FAMILY MEDICINE

## 2017-04-18 PROCEDURE — 36415 COLL VENOUS BLD VENIPUNCTURE: CPT | Performed by: FAMILY MEDICINE

## 2017-04-18 PROCEDURE — 82607 VITAMIN B-12: CPT | Performed by: FAMILY MEDICINE

## 2017-04-18 PROCEDURE — 85027 COMPLETE CBC AUTOMATED: CPT | Performed by: FAMILY MEDICINE

## 2017-04-18 PROCEDURE — 80053 COMPREHEN METABOLIC PANEL: CPT | Performed by: FAMILY MEDICINE

## 2017-05-04 DIAGNOSIS — I10 HTN (HYPERTENSION): ICD-10-CM

## 2017-05-05 RX ORDER — LISINOPRIL AND HYDROCHLOROTHIAZIDE 12.5; 2 MG/1; MG/1
TABLET ORAL
Qty: 30 TABLET | Refills: 10 | Status: SHIPPED | OUTPATIENT
Start: 2017-05-05 | End: 2017-09-26

## 2017-05-05 NOTE — TELEPHONE ENCOUNTER
lisinopril-hydrochlorothiazide (PRINZIDE/ZESTORETIC) 20-12.5 MG per tablet      Last Written Prescription Date: 3/24/17  Last Fill Quantity: 30, # refills: 0  Last Office Visit with G, [facility] or [facility] prescribing provider: 3/3/2017         Potassium   Date Value Ref Range Status   04/18/2017 4.0 3.4 - 5.3 mmol/L Final     Creatinine   Date Value Ref Range Status   04/18/2017 0.66 0.52 - 1.04 mg/dL Final     BP Readings from Last 3 Encounters:   03/03/17 104/80   02/08/17 106/76   10/27/16 122/76         DINESH Armijo  May 5, 2017  8:16 AM

## 2017-05-05 NOTE — TELEPHONE ENCOUNTER
Prescription approved per [facility] Refill Protocol.  Aide Fair RN

## 2017-07-12 DIAGNOSIS — E78.2 MIXED HYPERLIPIDEMIA: ICD-10-CM

## 2017-07-12 RX ORDER — ATORVASTATIN CALCIUM 40 MG/1
40 TABLET, FILM COATED ORAL DAILY
Qty: 90 TABLET | Refills: 2 | Status: SHIPPED | OUTPATIENT
Start: 2017-07-12 | End: 2018-07-10

## 2017-09-26 DIAGNOSIS — I10 HTN (HYPERTENSION): Primary | ICD-10-CM

## 2017-09-26 DIAGNOSIS — I10 ESSENTIAL HYPERTENSION: ICD-10-CM

## 2017-09-26 RX ORDER — METOPROLOL TARTRATE 25 MG/1
25 TABLET, FILM COATED ORAL 2 TIMES DAILY
Qty: 180 TABLET | Refills: 1 | Status: SHIPPED | OUTPATIENT
Start: 2017-09-26 | End: 2018-10-15

## 2017-09-26 RX ORDER — LISINOPRIL AND HYDROCHLOROTHIAZIDE 12.5; 2 MG/1; MG/1
1 TABLET ORAL DAILY
Qty: 90 TABLET | Refills: 1 | Status: SHIPPED | OUTPATIENT
Start: 2017-09-26 | End: 2018-06-02

## 2017-09-26 NOTE — TELEPHONE ENCOUNTER
Prescription approved per [facility] Refill Protocol.    Deann BOWERS RN, BSN, PHN  [place] Flex RN

## 2017-09-26 NOTE — TELEPHONE ENCOUNTER
Pharm is requesting a 90 day supply of BOTH meds.    lisinopril-hydrochlorothiazide (PRINZIDE/ZESTORETIC) 20-12.5 MG per tablet      Last Written Prescription Date: 5/5/17  Last Fill Quantity: 30, # refills: 10  Last Office Visit with [facility], [facility] or  Health prescribing provider: 3/3/2017         Potassium   Date Value Ref Range Status   04/18/2017 4.0 3.4 - 5.3 mmol/L Final     Creatinine   Date Value Ref Range Status   04/18/2017 0.66 0.52 - 1.04 mg/dL Final     BP Readings from Last 3 Encounters:   03/03/17 104/80   02/08/17 106/76   10/27/16 122/76     ________________________________________________________________________________    metoprolol (LOPRESSOR) 25 MG tablet     Sig: Take 1 tablet (25 mg) by mouth 2 times daily     Last Written Prescription Date: 4/11/17  Last Fill Quantity: 60, # refills: 5    Last Office Visit with [facility], [facility] or  Health prescribing provider:  3/3/2017    BP Readings from Last 3 Encounters:   03/03/17 104/80   02/08/17 106/76   10/27/16 122/76         DINESH Armijo  September 26, 2017  2:04 PM

## 2017-10-12 ENCOUNTER — HOSPITAL ENCOUNTER (OUTPATIENT)
Dept: ULTRASOUND IMAGING | Facility: CLINIC | Age: 67
Discharge: HOME | End: 2017-10-12
Attending: SURGERY | Admitting: SURGERY
Payer: MEDICARE

## 2017-10-12 ENCOUNTER — OFFICE VISIT (OUTPATIENT)
Dept: SURGERY | Facility: CLINIC | Age: 67
End: 2017-10-12
Attending: SURGERY
Payer: COMMERCIAL

## 2017-10-12 VITALS
OXYGEN SATURATION: 94 % | HEART RATE: 76 BPM | SYSTOLIC BLOOD PRESSURE: 108 MMHG | DIASTOLIC BLOOD PRESSURE: 70 MMHG | HEIGHT: 66 IN | WEIGHT: 275 LBS | BODY MASS INDEX: 44.2 KG/M2

## 2017-10-12 DIAGNOSIS — Z72.0 TOBACCO ABUSE: ICD-10-CM

## 2017-10-12 DIAGNOSIS — I65.29 CAROTID ARTERY STENOSIS: ICD-10-CM

## 2017-10-12 DIAGNOSIS — I65.22 RECURRENT CAROTID STENOSIS, LEFT: Primary | ICD-10-CM

## 2017-10-12 PROCEDURE — 99213 OFFICE O/P EST LOW 20 MIN: CPT | Performed by: SURGERY

## 2017-10-12 PROCEDURE — 93882 EXTRACRANIAL UNI/LTD STUDY: CPT | Mod: LT

## 2017-10-12 NOTE — MR AVS SNAPSHOT
After Visit Summary   10/12/2017    Calista Atkinson    MRN: [id number]           Patient Information     Date Of Birth          1950        Visit Information        Provider Department      10/12/2017 1:00 PM Devaughn Robledo MD [facility]      Today's Diagnoses     Recurrent carotid stenosis, left    -  1    Tobacco abuse           Follow-ups after your visit        Follow-up notes from your care team     Return in about 2 years (around 10/12/2019) for Bilateral carotid ultrasonography.      Who to contact     If you have questions or need follow up information about today's clinic visit or your schedule please contact [facility] directly at [phone].  Normal or non-critical lab and imaging results will be communicated to you by MyChart, letter or phone within 4 business days after the clinic has received the results. If you do not hear from us within 7 days, please contact the clinic through [email or web address] or phone. If you have a critical or abnormal lab result, we will notify you by phone as soon as possible.  Submit refill requests through Kobo or call your pharmacy and they will forward the refill request to us. Please allow 3 business days for your refill to be completed.          Additional Information About Your Visit        MyChart Information     Kobo gives you secure access to your electronic health record. If you see a primary care provider, you can also send messages to your care team and make appointments. If you have questions, please call your primary care clinic.  If you do not have a primary care provider, please call [phone] and they will assist you.        Care EveryWhere ID     This is your Care EveryWhere ID. This could be used by other organizations to access your [facility] medical records  [id number]        Your Vitals Were     Pulse Height Pulse Oximetry BMI (Body Mass Index)        "   76 5' 5.5\" (1.664 m) 94% 45.07 kg/m2         Blood Pressure from Last 3 Encounters:   10/12/17 108/70   03/03/17 104/80   02/08/17 106/76    Weight from Last 3 Encounters:   10/12/17 275 lb (124.7 kg)   03/03/17 275 lb 3.2 oz (124.8 kg)   02/08/17 273 lb 4.8 oz (124 kg)              Today, you had the following     No orders found for display         Today's Medication Changes          These changes are accurate as of: 10/12/17  1:15 PM.  If you have any questions, ask your nurse or doctor.               These medicines have changed or have updated prescriptions.        Dose/Directions    furosemide 40 MG tablet   Commonly known as:  LASIX   This may have changed:    - when to take this  - reasons to take this  - additional instructions   Used for:  HTN (hypertension), CAD (coronary artery disease)        Dose:  40 mg   Take 1 tablet (40 mg) by mouth daily Some days patient takes (2) tabs.   Quantity:  30 tablet   Refills:  1                Primary Care Provider Office Phone # Fax #    Twyla Edna Nick [phone]       [id number]  [address]   [facility] [id number]        Equal Access to Services     GEMA SANCHEZ AH: Hadii ray ponce hadasho Soomaali, waaxda luqadaha, qaybta kaalmada adeegyada, rojas moscoso. So [facility] [phone].    ATENCIÓN: Si habla español, tiene a alberto disposición servicios gratuitos de asistencia lingüística. Llame al [phone].    We comply with applicable federal civil rights laws and [place] laws. We do not discriminate on the basis of race, color, national origin, age, disability, sex, sexual orientation, or gender identity.            Thank you!     Thank you for choosing [facility]  for your care. Our goal is always to provide you with excellent care. Hearing back from our patients is one way we can continue to improve our services. Please take a few minutes to complete the written survey that you may receive in the " mail after your visit with us. Thank you!             Your Updated Medication List - Protect others around you: Learn how to safely use, store and throw away your medicines at www.disposemymeds.org.          This list is accurate as of: 10/12/17  1:15 PM.  Always use your most recent med list.                   Brand Name Dispense Instructions for use Diagnosis    * albuterol (2.5 MG/3ML) 0.083% neb solution     360 mL    Take 1 vial (2.5 mg) by nebulization every 6 hours as needed for shortness of breath / dyspnea or wheezing    Chronic obstructive pulmonary disease, unspecified COPD type (H)       * albuterol 108 (90 BASE) MCG/ACT Inhaler    PROAIR HFA/PROVENTIL HFA/VENTOLIN HFA    1 Inhaler    Inhale 2 puffs into the lungs every 6 hours as needed for shortness of breath / dyspnea or wheezing    Chronic obstructive pulmonary disease, unspecified COPD type (H)       aspirin 81 MG tablet      Take 81 mg by mouth daily        atorvastatin 40 MG tablet    LIPITOR    90 tablet    Take 1 tablet (40 mg) by mouth daily    Mixed hyperlipidemia       furosemide 40 MG tablet    LASIX    30 tablet    Take 1 tablet (40 mg) by mouth daily Some days patient takes (2) tabs.    HTN (hypertension), CAD (coronary artery disease)       IBUPROFEN IB OR      Take 1,000 mg by mouth daily as needed        lisinopril-hydrochlorothiazide 20-12.5 MG per tablet    PRINZIDE/ZESTORETIC    90 tablet    Take 1 tablet by mouth daily    HTN (hypertension)       metoprolol 25 MG tablet    LOPRESSOR    180 tablet    Take 1 tablet (25 mg) by mouth 2 times daily    Essential hypertension       omeprazole 40 MG capsule    priLOSEC    90 capsule    Take 1 capsule (40 mg) by mouth daily    Gastroesophageal reflux disease without esophagitis       tiotropium 18 MCG capsule    SPIRIVA HANDIHALER    30 capsule    Inhale contents of one capsule daily.    Chronic obstructive pulmonary disease, unspecified COPD type (H)       * Notice:  This list has 2  medication(s) that are the same as other medications prescribed for you. Read the directions carefully, and ask your doctor or other care provider to review them with you.

## 2017-10-12 NOTE — LETTER
[facility] [address]  [place] [address]  Phone: [phone]  Fax: [phone]    2017    Re: Calista Atkinson : 1950    Calista Atkinson is a 67-year-old female with hypertension, hyperlipidemia, and tobacco abuse who is status post left carotid endarterectomy performed at an outside institution in .  She has minimal right sided disease.  She presents today for annual left carotid ultrasound.  She has weaned herself down to 3 cigarettes per day and hopes to be completely tobacco free by this winter.  She denies stroke, symptoms of TIA or amaurosis over the past year.     Exam:  Pleasant, obese female in no acute distress.  Well-healed left carotid endarterectomy scar.  2+ palpable radial pulses bilaterally.  Neurologic exam nonfocal.     Imaging:     HISTORY:  67-year-old female status post left carotid endarterectomy  in .      COMPARISON: Ultrasound dated 10/27/2016.      FINDINGS:   Left carotid artery: There is soft plaque in the distal common carotid  artery/carotid bifurcation.      The peak systolic and diastolic velocities in the left internal  carotid artery are 96 and 36 cm/s, respectively, versus 112 and 46  cm/s on the prior exam. Per sonographic criteria, degree of stenosis  in the left internal carotid artery is less than 50%.      Flow in the left vertebral artery is antegrade.      IMPRESSION: Sonographic findings suggest a less than 50% stenosis in  the left internal carotid artery.     ASSESSMENT:  1.  5 years status post left carotid endarterectomy with mild, stable recurrent left carotid stenosis of less than 50%.     2.  Tobacco abuse     PLAN:  I reviewed all the above with Calista stressing the importance of absolute tobacco cessation. She will continue her medical regimen which includes daily aspirin.  Her carotid disease has been stable and unchanged.  I will see her back again in 2 years for repeat bilateral carotid  ultrasonography.     Edy Robledo M.D.

## 2017-10-23 NOTE — PROGRESS NOTES
Calista Atkinson is a 67-year-old female with hypertension, hyperlipidemia, and tobacco abuse who is status post left carotid endarterectomy performed at an outside institution in 2012.  She has minimal right sided disease.  She presents today for annual left carotid ultrasound.  She has weaned herself down to 3 cigarettes per day and hopes to be completely tobacco free by this winter.  She denies stroke, symptoms of TIA or amaurosis over the past year.    Exam:  Pleasant, obese female in no acute distress.  Well-healed left carotid endarterectomy scar.  2+ palpable radial pulses bilaterally.  Neurologic exam nonfocal.    Imaging:    HISTORY:  67-year-old female status post left carotid endarterectomy  in 2012.     COMPARISON: Ultrasound dated 10/27/2016.     FINDINGS:   Left carotid artery: There is soft plaque in the distal common carotid  artery/carotid bifurcation.     The peak systolic and diastolic velocities in the left internal  carotid artery are 96 and 36 cm/s, respectively, versus 112 and 46  cm/s on the prior exam. Per sonographic criteria, degree of stenosis  in the left internal carotid artery is less than 50%.     Flow in the left vertebral artery is antegrade.         IMPRESSION: Sonographic findings suggest a less than 50% stenosis in  the left internal carotid artery.    ASSESSMENT:  1.  5 years status post left carotid endarterectomy with mild, stable recurrent left carotid stenosis of less than 50%.    2.  Tobacco abuse    PLAN:  I reviewed all the above with Calista stressing the importance of absolute tobacco cessation.  She will continue her medical regimen which includes daily aspirin.  Her carotid disease has been stable and unchanged.  I will see her back again in 2 years for repeat bilateral carotid ultrasonography.    Total face-to-face time was 15 minutes, greater than 50% spent providing counseling and education.    Edy Robledo M.D.  
stated

## 2018-01-16 ENCOUNTER — RADIANT APPOINTMENT (OUTPATIENT)
Dept: GENERAL RADIOLOGY | Facility: CLINIC | Age: 68
End: 2018-01-16
Attending: FAMILY MEDICINE
Payer: COMMERCIAL

## 2018-01-16 ENCOUNTER — OFFICE VISIT (OUTPATIENT)
Dept: FAMILY MEDICINE | Facility: CLINIC | Age: 68
End: 2018-01-16
Payer: COMMERCIAL

## 2018-01-16 VITALS
HEART RATE: 76 BPM | DIASTOLIC BLOOD PRESSURE: 76 MMHG | OXYGEN SATURATION: 97 % | SYSTOLIC BLOOD PRESSURE: 112 MMHG | BODY MASS INDEX: 45 KG/M2 | HEIGHT: 66 IN | WEIGHT: 280 LBS | TEMPERATURE: 98.4 F | RESPIRATION RATE: 28 BRPM

## 2018-01-16 DIAGNOSIS — J06.9 VIRAL URI WITH COUGH: ICD-10-CM

## 2018-01-16 DIAGNOSIS — Z72.0 TOBACCO ABUSE DISORDER: ICD-10-CM

## 2018-01-16 DIAGNOSIS — J44.9 CHRONIC OBSTRUCTIVE PULMONARY DISEASE, UNSPECIFIED COPD TYPE (H): ICD-10-CM

## 2018-01-16 DIAGNOSIS — J20.9 ACUTE BRONCHITIS, UNSPECIFIED ORGANISM: Primary | ICD-10-CM

## 2018-01-16 LAB
FLUAV+FLUBV AG SPEC QL: NEGATIVE
FLUAV+FLUBV AG SPEC QL: NEGATIVE
SPECIMEN SOURCE: NORMAL

## 2018-01-16 PROCEDURE — 71046 X-RAY EXAM CHEST 2 VIEWS: CPT | Mod: FY

## 2018-01-16 PROCEDURE — 99214 OFFICE O/P EST MOD 30 MIN: CPT | Performed by: FAMILY MEDICINE

## 2018-01-16 PROCEDURE — 87804 INFLUENZA ASSAY W/OPTIC: CPT | Performed by: FAMILY MEDICINE

## 2018-01-16 RX ORDER — LEVOFLOXACIN 750 MG/1
750 TABLET, FILM COATED ORAL DAILY
Qty: 10 TABLET | Refills: 0 | Status: SHIPPED | OUTPATIENT
Start: 2018-01-16 | End: 2018-02-05

## 2018-01-16 RX ORDER — CODEINE PHOSPHATE AND GUAIFENESIN 10; 100 MG/5ML; MG/5ML
1 SOLUTION ORAL EVERY 4 HOURS PRN
Qty: 120 ML | Refills: 0 | Status: SHIPPED | OUTPATIENT
Start: 2018-01-16 | End: 2018-02-05

## 2018-01-16 ASSESSMENT — PAIN SCALES - GENERAL: PAINLEVEL: EXTREME PAIN (9)

## 2018-01-16 NOTE — MR AVS SNAPSHOT
After Visit Summary   1/16/2018    Calista Atkinson    MRN: [id number]           Patient Information     Date Of Birth          1950        Visit Information        Provider Department      1/16/2018 9:40 AM Twyla Nick MD [facility]        Today's Diagnoses     Pneumonia due to infectious organism, unspecified laterality, unspecified part of lung    -  1       Follow-ups after your visit        Follow-up notes from your care team     Return in about 2 days (around 1/18/2018).      Your next 10 appointments already scheduled     Feb 05, 2018 10:45 AM CST   Return Visit with Sully Hanye DO   [facility] ([facility])    [address] [address] [address] [phone]              Who to contact     If you have questions or need follow up information about today's clinic visit or your schedule please contact [facility] directly at [phone].  Normal or non-critical lab and imaging results will be communicated to you by Ohmconnecthart, letter or phone within 4 business days after the clinic has received the results. If you do not hear from us within 7 days, please contact the clinic through PipelineDBt or phone. If you have a critical or abnormal lab result, we will notify you by phone as soon as possible.  Submit refill requests through Analyze Re or call your pharmacy and they will forward the refill request to us. Please allow 3 business days for your refill to be completed.          Additional Information About Your Visit        MyChart Information     Analyze Re gives you secure access to your electronic health record. If you see a primary care provider, you can also send messages to your care team and make appointments. If you have questions, please call your primary care clinic.  If you do not have a primary care provider, please call [phone] and they will assist you.       "  Care EveryWhere ID     This is your Care EveryWhere ID. This could be used by other organizations to access your [place] medical records  [id number]        Your Vitals Were     Pulse Temperature Respirations Height Pulse Oximetry BMI (Body Mass Index)    76 98.4  F (36.9  C) (Oral) 28 5' 5.5\" (1.664 m) 97% 45.89 kg/m2       Blood Pressure from Last 3 Encounters:   01/16/18 112/76   10/12/17 108/70   03/03/17 104/80    Weight from Last 3 Encounters:   01/16/18 280 lb (127 kg)   10/12/17 275 lb (124.7 kg)   03/03/17 275 lb 3.2 oz (124.8 kg)              We Performed the Following     Influenza A/B antigen          Today's Medication Changes          These changes are accurate as of: 1/16/18 10:21 AM.  If you have any questions, ask your nurse or doctor.               Start taking these medicines.        Dose/Directions    guaiFENesin-codeine 100-10 MG/5ML Soln solution   Commonly known as:  ROBITUSSIN AC   Used for:  Pneumonia due to infectious organism, unspecified laterality, unspecified part of lung   Started by:  Twyla Nick MD        Dose:  1 tsp.   Take 5 mLs by mouth every 4 hours as needed for cough   Quantity:  120 mL   Refills:  0       levofloxacin 750 MG tablet   Commonly known as:  LEVAQUIN   Used for:  Pneumonia due to infectious organism, unspecified laterality, unspecified part of lung   Started by:  Twyla Nick MD        Dose:  750 mg   Take 1 tablet (750 mg) by mouth daily   Quantity:  10 tablet   Refills:  0         These medicines have changed or have updated prescriptions.        Dose/Directions    furosemide 40 MG tablet   Commonly known as:  LASIX   This may have changed:    - when to take this  - reasons to take this  - additional instructions   Used for:  HTN (hypertension), CAD (coronary artery disease)        Dose:  40 mg   Take 1 tablet (40 mg) by mouth daily Some days patient takes (2) tabs.   Quantity:  30 tablet   Refills:  1         Stop taking these " medicines if you haven't already. Please contact your care team if you have questions.     tiotropium 18 MCG capsule   Commonly known as:  SPIRIVA HANDIHALER   Stopped by:  Twyla Nick MD                Where to get your medicines      These medications were sent to [facility] [address] [place], [place] - [address] [facility]  [address] [facility] [address]     Phone:  [phone]     levofloxacin 750 MG tablet         Some of these will need a paper prescription and others can be bought over the counter.  Ask your nurse if you have questions.     Bring a paper prescription for each of these medications     guaiFENesin-codeine 100-10 MG/5ML Soln solution                Primary Care Provider Office Phone # Fax #    Twyla Nick [phone]  [address]   [facility] [address]        Equal Access to Services     [facility]: Hadii aad ku hadasho Soomaali, waaxda luqadaha, qaybta kaalmada adeegyada, waxay idiin hayaan adelexi poon . So [facility] [phone].    ATENCIÓN: Si habla español, tiene a alberto disposición servicios gratuitos de asistencia lingüística. Llame al [phone].    We comply with applicable federal civil rights laws and [place] laws. We do not discriminate on the basis of race, color, national origin, age, disability, sex, sexual orientation, or gender identity.            Thank you!     Thank you for choosing [facility]  for your care. Our goal is always to provide you with excellent care. Hearing back from our patients is one way we can continue to improve our services. Please take a few minutes to complete the written survey that you may receive in the mail after your visit with us. Thank you!             Your Updated Medication List - Protect others around you: Learn how to safely use, store and throw away your medicines at www.disposemymeds.org.          This list is accurate as of: 1/16/18 10:21 AM.  Always use  your most recent med list.                   Brand Name Dispense Instructions for use Diagnosis    * albuterol (2.5 MG/3ML) 0.083% neb solution     360 mL    Take 1 vial (2.5 mg) by nebulization every 6 hours as needed for shortness of breath / dyspnea or wheezing    Chronic obstructive pulmonary disease, unspecified COPD type (H)       * albuterol 108 (90 BASE) MCG/ACT Inhaler    PROAIR HFA/PROVENTIL HFA/VENTOLIN HFA    1 Inhaler    Inhale 2 puffs into the lungs every 6 hours as needed for shortness of breath / dyspnea or wheezing    Chronic obstructive pulmonary disease, unspecified COPD type (H)       aspirin 81 MG tablet      Take 81 mg by mouth daily        atorvastatin 40 MG tablet    LIPITOR    90 tablet    Take 1 tablet (40 mg) by mouth daily    Mixed hyperlipidemia       furosemide 40 MG tablet    LASIX    30 tablet    Take 1 tablet (40 mg) by mouth daily Some days patient takes (2) tabs.    HTN (hypertension), CAD (coronary artery disease)       guaiFENesin-codeine 100-10 MG/5ML Soln solution    ROBITUSSIN AC    120 mL    Take 5 mLs by mouth every 4 hours as needed for cough    Pneumonia due to infectious organism, unspecified laterality, unspecified part of lung       IBUPROFEN IB OR      Take 1,000 mg by mouth daily as needed        levofloxacin 750 MG tablet    LEVAQUIN    10 tablet    Take 1 tablet (750 mg) by mouth daily    Pneumonia due to infectious organism, unspecified laterality, unspecified part of lung       lisinopril-hydrochlorothiazide 20-12.5 MG per tablet    PRINZIDE/ZESTORETIC    90 tablet    Take 1 tablet by mouth daily    HTN (hypertension)       metoprolol tartrate 25 MG tablet    LOPRESSOR    180 tablet    Take 1 tablet (25 mg) by mouth 2 times daily    Essential hypertension       omeprazole 40 MG capsule    priLOSEC    90 capsule    Take 1 capsule (40 mg) by mouth daily    Gastroesophageal reflux disease without esophagitis       * Notice:  This list has 2 medication(s) that are the  same as other medications prescribed for you. Read the directions carefully, and ask your doctor or other care provider to review them with you.

## 2018-01-16 NOTE — PROGRESS NOTES
HPI   SUBJECTIVE:   Calista Atkinson is a 67 year old female who presents to clinic today for the following health issues:    Acute Illness   Acute illness concerns: fever  Onset: 1 week ago    Fever: YES    Chills/Sweats: YES    Headache (location?): YES    Sinus Pressure:no    Conjunctivitis:  no    Ear Pain: no    Rhinorrhea: YES- a little bit     Congestion: YES- chest    Sore Throat: no     Cough: YES-productive of yellow sputum    Wheeze: YES    Decreased Appetite: YES    Nausea: no    Vomiting: no    Diarrhea:  no    Dysuria/Freq.: no    Fatigue/Achiness: YES- both    Sick/Strep Exposure: no     Therapies Tried and outcome: neb treatments, ibuprofen 500 mg every 4 hours and cough syrup and nite time tylenol-has helped symptoms.     Patient has been sick for a week. She could feel she was getting something like a cold.She has been experiencing body aches, fevers, chills, coughing, wheezing and a headache. Symptoms worsened yesterday. Patient had very bad fevers and chills last night. She did not take her temp but could feel it in her eyes and mouth- extreme dry mouth.  Has been experiencing lower back pain. Has a difficult time laying down and getting up. No sore throat or ear ache. Mild rhinitis. Cough is productive with yellow sputum. She did not receive the flu shot this year. She has been taking ibuprofen 500 mg daily, no tylenol. She has not eaten anything for 2 days, has only been drinking water. The last time she was this sick was 2-3 years ago when she had pneumonia. Patient reports that she is going on a [place] 2/9/2018.     Of note, patient has COPD. Has been using nebulizer 4x yesterday and today. Has been taking robitussin DM but then switched to Tylenol nite time because she has not been able to sleep.     Tobacco use  She has not smoked any cigarettes for 3 days because she has not felt like it. She is going to try to keep it up to quit.    Problem list and histories reviewed & adjusted, as  "indicated.  Additional history: as documented    BP Readings from Last 3 Encounters:   01/16/18 112/76   10/12/17 108/70   03/03/17 104/80    Wt Readings from Last 3 Encounters:   01/16/18 127 kg (280 lb)   10/12/17 124.7 kg (275 lb)   03/03/17 124.8 kg (275 lb 3.2 oz)         Labs reviewed in EPIC    Reviewed and updated as needed this visit by clinical staffTobacco  Allergies  Meds  Problems  Med Hx  Surg Hx  Fam Hx  Soc Hx        Reviewed and updated as needed this visit by Provider       ROS:  Constitutional, HEENT, cardiovascular, pulmonary, gi and gu systems are negative, except as otherwise noted.    Constitutional: POSITIVE for fever and chills  HEENT: POSITIVE for headache  RESP: POSITIVE for productive cough  GI: POSITIVE for decreased appetite  MUSC: POSITIVE for body aches and low back pain    This document serves as a record of the services and decisions personally performed and made by Twyla Nick MD. It was created on her behalf by Donita Elliott, a trained medical scribe. The creation of this document is based on the provider's statements to the medical scribe.  Dontia Elliott January 16, 2018 9:47 AM     OBJECTIVE:     /76 (BP Location: Right arm, Patient Position: Chair, Cuff Size: Adult Large)  Pulse 76  Temp 98.4  F (36.9  C) (Oral)  Resp 28  Ht 1.664 m (5' 5.5\")  Wt 127 kg (280 lb)  SpO2 97%  BMI 45.89 kg/m2  Body mass index is 45.89 kg/(m^2).    GENERAL: healthy, alert and no distress, coughing during appointment , has a mask on  EYES: Eyes grossly normal to inspection, and conjunctivae and sclerae normal  HENT: ear canals and TM's normal, nose and mouth without ulcers or lesions  NECK: no adenopathy, no asymmetry, masses, or scars and thyroid normal to palpation  RESP: lungs clear to auscultation - no rales, rhonchi , occassional wheezes  CV: regular rate and rhythm, normal S1 S2, no S3 or S4, no murmur, click or rub, MS: no gross musculoskeletal defects noted,   SKIN: " no suspicious lesions or rashes  NEURO: Normal strength and tone, mentation intact and speech normal  PSYCH: mentation appears normal, affect normal/bright    Diagnostic Test Results:  Xray - no pneumonia, negative for the flu    ASSESSMENT/PLAN:   Acute bronchitis due to infectious organism, unspecified laterality, unspecified part of lung  (primary encounter diagnosis)  Comment:   Influenza test negative. Will send chest x-ray to radiologist for interpretation. Suspect pneumonia. Continue using nebs every 4-6 hours. Will start Levaquin, take once a day for 10 days. Discussed that tendonitis is a rare but possible side effect of Levaquin.   Plan: XR Chest 2 Views, Influenza A/B antigen,         levofloxacin (LEVAQUIN) 750 MG tablet,         guaiFENesin-codeine (ROBITUSSIN AC) 100-10         MG/5ML SOLN solution          Continue with not smoking. Encouraged and congratulated, pt wanting to stop on her own and does not plan on starting again    COPD-increase concerns for complications from the bronchitis, neb 3-4 timnes daily, take the abx, do not smoke and Follow up if worsening in the next few days, rather then improving    Follow up if symptoms do not improve or worsen.        The information in this document, created by the medical scribe for me, accurately reflects the services I personally performed and the decisions made by me. I have reviewed and approved this document for accuracy prior to leaving the patient care area.  January 16, 2018 9:47 AM    Twyla Nick MD  [facility]      Physical Exam

## 2018-01-16 NOTE — NURSING NOTE
"Chief Complaint   Patient presents with     URI     Initial /76 (BP Location: Right arm, Patient Position: Chair, Cuff Size: Adult Large)  Pulse 76  Temp 98.4  F (36.9  C) (Oral)  Resp 28  Ht 5' 5.5\" (1.664 m)  Wt 280 lb (127 kg)  SpO2 97%  BMI 45.89 kg/m2 Estimated body mass index is 45.89 kg/(m^2) as calculated from the following:    Height as of this encounter: 5' 5.5\" (1.664 m).    Weight as of this encounter: 280 lb (127 kg).  BP completed using cuff size large right arm.    Lisa Magill, CMA    "

## 2018-01-30 ENCOUNTER — PRE VISIT (OUTPATIENT)
Dept: CARDIOLOGY | Facility: CLINIC | Age: 68
End: 2018-01-30

## 2018-02-05 ENCOUNTER — OFFICE VISIT (OUTPATIENT)
Dept: CARDIOLOGY | Facility: CLINIC | Age: 68
End: 2018-02-05
Attending: INTERNAL MEDICINE
Payer: COMMERCIAL

## 2018-02-05 VITALS
BODY MASS INDEX: 45.06 KG/M2 | SYSTOLIC BLOOD PRESSURE: 116 MMHG | HEART RATE: 88 BPM | WEIGHT: 280.4 LBS | DIASTOLIC BLOOD PRESSURE: 80 MMHG | HEIGHT: 66 IN

## 2018-02-05 DIAGNOSIS — Z98.890 S/P CAROTID ENDARTERECTOMY: ICD-10-CM

## 2018-02-05 DIAGNOSIS — E78.5 HYPERLIPIDEMIA LDL GOAL <70: ICD-10-CM

## 2018-02-05 DIAGNOSIS — I25.10 CORONARY ARTERY DISEASE INVOLVING NATIVE CORONARY ARTERY OF NATIVE HEART WITHOUT ANGINA PECTORIS: ICD-10-CM

## 2018-02-05 DIAGNOSIS — E66.01 MORBID OBESITY, UNSPECIFIED OBESITY TYPE (H): ICD-10-CM

## 2018-02-05 PROCEDURE — 99213 OFFICE O/P EST LOW 20 MIN: CPT | Performed by: INTERNAL MEDICINE

## 2018-02-05 NOTE — MR AVS SNAPSHOT
After Visit Summary   2/5/2018    Calista Atkinson    MRN: [id number]           Patient Information     Date Of Birth          1950        Visit Information        Provider Department      2/5/2018 10:45 AM Sully Haney DO [facility]        Today's Diagnoses     Hyperlipidemia LDL goal <70        Coronary artery disease involving native coronary artery of native heart without angina pectoris        S/P carotid endarterectomy        Morbid obesity, unspecified obesity type (H)           Follow-ups after your visit        Who to contact     If you have questions or need follow up information about today's clinic visit or your schedule please contact [facility] directly at [phone].  Normal or non-critical lab and imaging results will be communicated to you by RadarFindhart, letter or phone within 4 business days after the clinic has received the results. If you do not hear from us within 7 days, please contact the clinic through RadarFindhart or phone. If you have a critical or abnormal lab result, we will notify you by phone as soon as possible.  Submit refill requests through FinanzCheck or call your pharmacy and they will forward the refill request to us. Please allow 3 business days for your refill to be completed.          Additional Information About Your Visit        MyChart Information     FinanzCheck gives you secure access to your electronic health record. If you see a primary care provider, you can also send messages to your care team and make appointments. If you have questions, please call your primary care clinic.  If you do not have a primary care provider, please call [phone] and they will assist you.        Care EveryWhere ID     This is your Care EveryWhere ID. This could be used by other organizations to access your [place] medical records  [id number]        Your Vitals Were     Pulse  "Height Breastfeeding? BMI (Body Mass Index)          88 1.664 m (5' 5.5\") No 45.95 kg/m2         Blood Pressure from Last 3 Encounters:   02/05/18 116/80   01/16/18 112/76   10/12/17 108/70    Weight from Last 3 Encounters:   02/05/18 127.2 kg (280 lb 6.4 oz)   01/16/18 127 kg (280 lb)   10/12/17 124.7 kg (275 lb)              We Performed the Following     Follow-Up with Cardiologist          Today's Medication Changes          These changes are accurate as of 2/5/18 10:54 AM.  If you have any questions, ask your nurse or doctor.               These medicines have changed or have updated prescriptions.        Dose/Directions    furosemide 40 MG tablet   Commonly known as:  LASIX   This may have changed:    - when to take this  - reasons to take this  - additional instructions   Used for:  HTN (hypertension), CAD (coronary artery disease)        Dose:  40 mg   Take 1 tablet (40 mg) by mouth daily Some days patient takes (2) tabs.   Quantity:  30 tablet   Refills:  1                Primary Care Provider Office Phone # Fax #    Twyla Edna Nick [phone]       [id number]  [address]   [facility] [address]        Equal Access to Services     [facility]: Hadii ray ponce hadasho Soomaali, waaxda luqadaha, qaybta kaalmada adeegyada, rojas moscoso. So [facility] [phone].    ATENCIÓN: Si habla español, tiene a alberto disposición servicios gratuitos de asistencia lingüística. Llame al [phone].    We comply with applicable federal civil rights laws and [place] laws. We do not discriminate on the basis of race, color, national origin, age, disability, sex, sexual orientation, or gender identity.            Thank you!     Thank you for choosing [facility]  for your care. Our goal is always to provide you with excellent care. Hearing back from our patients is one way we can continue to improve our services. Please take a few minutes " to complete the written survey that you may receive in the mail after your visit with us. Thank you!             Your Updated Medication List - Protect others around you: Learn how to safely use, store and throw away your medicines at www.disposemymeds.org.          This list is accurate as of 2/5/18 10:54 AM.  Always use your most recent med list.                   Brand Name Dispense Instructions for use Diagnosis    * albuterol (2.5 MG/3ML) 0.083% neb solution     360 mL    Take 1 vial (2.5 mg) by nebulization every 6 hours as needed for shortness of breath / dyspnea or wheezing    Chronic obstructive pulmonary disease, unspecified COPD type (H)       * albuterol 108 (90 BASE) MCG/ACT Inhaler    PROAIR HFA/PROVENTIL HFA/VENTOLIN HFA    1 Inhaler    Inhale 2 puffs into the lungs every 6 hours as needed for shortness of breath / dyspnea or wheezing    Chronic obstructive pulmonary disease, unspecified COPD type (H)       aspirin 81 MG tablet      Take 81 mg by mouth daily        atorvastatin 40 MG tablet    LIPITOR    90 tablet    Take 1 tablet (40 mg) by mouth daily    Mixed hyperlipidemia       furosemide 40 MG tablet    LASIX    30 tablet    Take 1 tablet (40 mg) by mouth daily Some days patient takes (2) tabs.    HTN (hypertension), CAD (coronary artery disease)       IBUPROFEN IB OR      Take 1,000 mg by mouth daily as needed        lisinopril-hydrochlorothiazide 20-12.5 MG per tablet    PRINZIDE/ZESTORETIC    90 tablet    Take 1 tablet by mouth daily    HTN (hypertension)       metoprolol tartrate 25 MG tablet    LOPRESSOR    180 tablet    Take 1 tablet (25 mg) by mouth 2 times daily    Essential hypertension       omeprazole 40 MG capsule    priLOSEC    90 capsule    Take 1 capsule (40 mg) by mouth daily    Gastroesophageal reflux disease without esophagitis       * Notice:  This list has 2 medication(s) that are the same as other medications prescribed for you. Read the directions carefully, and ask your  doctor or other care provider to review them with you.

## 2018-02-05 NOTE — PROGRESS NOTES
HPI and Plan:   See dictation    No orders of the defined types were placed in this encounter.      No orders of the defined types were placed in this encounter.      Medications Discontinued During This Encounter   Medication Reason     guaiFENesin-codeine (ROBITUSSIN AC) 100-10 MG/5ML SOLN solution Therapy completed     levofloxacin (LEVAQUIN) 750 MG tablet Therapy completed         Encounter Diagnoses   Name Primary?     Hyperlipidemia LDL goal <70      Coronary artery disease involving native coronary artery of native heart without angina pectoris      S/P carotid endarterectomy      Morbid obesity, unspecified obesity type (H)        CURRENT MEDICATIONS:  Current Outpatient Prescriptions   Medication Sig Dispense Refill     lisinopril-hydrochlorothiazide (PRINZIDE/ZESTORETIC) 20-12.5 MG per tablet Take 1 tablet by mouth daily 90 tablet 1     metoprolol (LOPRESSOR) 25 MG tablet Take 1 tablet (25 mg) by mouth 2 times daily 180 tablet 1     atorvastatin (LIPITOR) 40 MG tablet Take 1 tablet (40 mg) by mouth daily 90 tablet 2     omeprazole (PRILOSEC) 40 MG capsule Take 1 capsule (40 mg) by mouth daily 90 capsule 3     albuterol (2.5 MG/3ML) 0.083% neb solution Take 1 vial (2.5 mg) by nebulization every 6 hours as needed for shortness of breath / dyspnea or wheezing 360 mL 0     albuterol (PROAIR HFA/PROVENTIL HFA/VENTOLIN HFA) 108 (90 BASE) MCG/ACT Inhaler Inhale 2 puffs into the lungs every 6 hours as needed for shortness of breath / dyspnea or wheezing 1 Inhaler 3     IBUPROFEN IB OR Take 1,000 mg by mouth daily as needed       furosemide (LASIX) 40 MG tablet Take 1 tablet (40 mg) by mouth daily Some days patient takes (2) tabs. (Patient taking differently: Take 40 mg by mouth as needed (Pt taking on a  PRN basis) Some days patient takes (2) tabs.) 30 tablet 1     aspirin 81 MG tablet Take 81 mg by mouth daily         ALLERGIES   No Known Allergies    PAST MEDICAL HISTORY:  Past Medical History:   Diagnosis Date      CAD (coronary artery disease) 6/2012    STEMI and stent      Carotid artery plaque 1/9/2015     Cellulitis      COPD (chronic obstructive pulmonary disease) (H)      CVA (cerebral infarction) 2012     Gastro-oesophageal reflux disease      Hyperlipidemia      Hypertension      Myocardial infarction 2012     S/P carotid endarterectomy 1/9/2015       PAST SURGICAL HISTORY:  Past Surgical History:   Procedure Laterality Date     ANGIOGRAM  6/6/12    thrombectomy and stent RCA     ENDARTERECTOMY CAROTID Left 3/27/12     HEART CATH LEFT HEART CATH  03/02/15    Evidence of old MI, Continued good results of the stent in mid RCA with mild disease elsewhere in the RCA, Disease in very small branch of the distal CFX.      TONSILLECTOMY      age 9       FAMILY HISTORY:  Family History   Problem Relation Age of Onset     Myocardial Infarction Mother      Prostate Cancer Father      CANCER Father      Lymphnode     Cancer - colorectal Paternal Grandfather      Alcohol/Drug Son      Alcoholic     Myocardial Infarction Brother      Respiratory Brother      Pneumonia        SOCIAL HISTORY:  Social History     Social History     Marital status:      Spouse name: N/A     Number of children: N/A     Years of education: N/A     Social History Main Topics     Smoking status: Current Every Day Smoker     Packs/day: 0.50     Types: Cigarettes     Smokeless tobacco: Never Used      Comment: 4-5 cig daily.      Alcohol use No     Drug use: No     Sexual activity: No     Other Topics Concern     Caffeine Concern Yes     2-3 cups daily     Sleep Concern Yes     off and on sleeping     Special Diet No     Exercise No     Social History Narrative       Review of Systems:  Skin:  Negative       Eyes:  Positive for glasses    ENT:  Negative      Respiratory:  Positive for shortness of breath;dyspnea on exertion     Cardiovascular:    Positive for;fatigue    Gastroenterology:        Genitourinary:  Positive for urinary  "frequency;incontinence;nocturia    Musculoskeletal:  Positive for back pain    Neurologic:  Negative      Psychiatric:  Positive for sleep disturbances    Heme/Lymph/Imm:  Negative      Endocrine:  Negative        Physical Exam:  Vitals: /80 (BP Location: Right arm, Patient Position: Sitting, Cuff Size: Adult Large)  Pulse 88  Ht 1.664 m (5' 5.5\")  Wt 127.2 kg (280 lb 6.4 oz)  Breastfeeding? No  BMI 45.95 kg/m2    Constitutional:  cooperative;in no acute distress morbidly obese      Skin:  warm and dry to the touch          Head:  normocephalic        Eyes:  pupils equal and round        Lymph:      ENT:  no pallor or cyanosis        Neck:  no carotid bruit        Respiratory:  normal symmetry;clear to auscultation diminished breath sounds bilaterally       Cardiac: regular rhythm;no murmurs, gallops or rubs detected;normal S1 and S2                  pulses below the femoral arteries are diminished                                      GI:  abdomen soft;no bruits obese      Extremities and Muscular Skeletal:      bilateral LE edema;trace          Neurological:  no gross motor deficits        Psych:  Alert and Oriented x 3          CC  Sully Adrienne Haney,   [address]  [place] [id number]                  "

## 2018-02-05 NOTE — PROGRESS NOTES
Service Date: 02/05/2018      HISTORY OF PRESENT ILLNESS:  Ms. Atkinson is a pleasant 67-year-old female with a history of coronary disease, previous inferior wall myocardial infarction with preserved LV systolic function and peripheral arterial disease with a left carotid endarterectomy.  She has ongoing tobacco abuse as well.  She is here for annual followup visit today.        In the last year, she has not had any major health issues; however, she is just recovering again from an episode of bronchitis.  She had a similar respiratory illness the last time I saw her last February as well.  Otherwise, she has not had any change in her breathing, chest pains, palpitations or lightheadedness.  She is all excited because she is going to go on [employer] for the very first time here on Friday.        I have reviewed her last lab work.  In 02/2017, she had a cholesterol profile that showed improved numbers.  Total cholesterol was 180, HDL 54, LDL 83, triglycerides 217, but were down from her previous numbers of 248 the year prior.  She has had a basic metabolic panel which was completely normal in April.  Hemoglobin and platelets were normal in April and liver function tests were normal in April.      PHYSICAL EXAMINATION:  Today her blood pressure was measured at 116/80, pulse is 88, weight 280 which is up about 5-7 pounds from last year.  Body mass index of 46.  She had a carotid ultrasound in October of last year that showed no recurrent stenosis on the left side.  I do not appreciate carotid bruits on exam today.  Cardiovascular tones are regular and slightly fast, but no murmur, gallop or rub is appreciated.  Lungs are slightly diminished posteriorly but otherwise clear and she is experiencing some mild peripheral edema on occasion; I appreciate trace today on exam.      IMPRESSION:   In summary, Ms. Atkinson is a very pleasant 67-year-old female with a history of coronary disease, previous inferior wall myocardial  infarction with preserved LV systolic function, peripheral arterial disease, status post left carotid endarterectomy, ongoing tobacco abuse and obesity.  She is stable from a cardiac perspective.  She is recovering from an episode of bronchitis.  We did talk about smoking cessation once again today.  She has continued to attempt to cut back and is down to 2-4 cigarettes per day.  She did not need any medication refills today.  I encouraged her to continue to work on smoking cessation and I am happy to see her back as needed.        Please feel free to contact me with any questions you have in regards to her care.      cc:      Twyla Nick MD    [facility]   [address] [place] [address]         LEXIS ELAM DO             D: 2018   T: 2018   MT: SHAHBAZ      Name:     GLENIS HENDRICKSON   MRN:      [id number]        Account:      [id number]   :      1950           Service Date: 2018      Document: [id number]

## 2018-02-05 NOTE — LETTER
2/5/2018      Twyla Nick MD  [address]  [facility] [address]      RE: Calista OMAR [place]       Dear Colleague,    I had the pleasure of seeing Calista Atkinson in the [facility].    Service Date: 02/05/2018      HISTORY OF PRESENT ILLNESS:  Ms. Atkinson is a pleasant 67-year-old female with a history of coronary disease, previous inferior wall myocardial infarction with preserved LV systolic function and peripheral arterial disease with a left carotid endarterectomy.  She has ongoing tobacco abuse as well.  She is here for annual followup visit today.        In the last year, she has not had any major health issues; however, she is just recovering again from an episode of bronchitis.  She had a similar respiratory illness the last time I saw her last February as well.  Otherwise, she has not had any change in her breathing, chest pains, palpitations or lightheadedness.  She is all excited because she is going to go on [email or web address] for the very first time here on Friday.        I have reviewed her last lab work.  In 02/2017, she had a cholesterol profile that showed improved numbers.  Total cholesterol was 180, HDL 54, LDL 83, triglycerides 217, but were down from her previous numbers of 248 the year prior.  She has had a basic metabolic panel which was completely normal in April.  Hemoglobin and platelets were normal in April and liver function tests were normal in April.      PHYSICAL EXAMINATION:  Today her blood pressure was measured at 116/80, pulse is 88, weight 280 which is up about 5-7 pounds from last year.  Body mass index of 46.  She had a carotid ultrasound in October of last year that showed no recurrent stenosis on the left side.  I do not appreciate carotid bruits on exam today.  Cardiovascular tones are regular and slightly fast, but no murmur, gallop or rub is appreciated.  Lungs are slightly diminished posteriorly but otherwise clear and she is  experiencing some mild peripheral edema on occasion; I appreciate trace today on exam.      IMPRESSION:   In summary, Ms. Atkinson is a very pleasant 67-year-old female with a history of coronary disease, previous inferior wall myocardial infarction with preserved LV systolic function, peripheral arterial disease, status post left carotid endarterectomy, ongoing tobacco abuse and obesity.  She is stable from a cardiac perspective.  She is recovering from an episode of bronchitis.  We did talk about smoking cessation once again today.  She has continued to attempt to cut back and is down to 2-4 cigarettes per day.  She did not need any medication refills today.  I encouraged her to continue to work on smoking cessation and I am happy to see her back as needed.        Please feel free to contact me with any questions you have in regards to her care.      cc:      Twyla Nick MD    [facility]   [address] [place] [address]         LEXIS ELAM DO             D: 2018   T: 2018   MT: SHAHBAZ      Name:     GLENIS ATKINSON   MRN:      [id number]        Account:      [id number]   :      1950           Service Date: 2018      Document: [id number]         Outpatient Encounter Prescriptions as of 2018   Medication Sig Dispense Refill     lisinopril-hydrochlorothiazide (PRINZIDE/ZESTORETIC) 20-12.5 MG per tablet Take 1 tablet by mouth daily 90 tablet 1     metoprolol (LOPRESSOR) 25 MG tablet Take 1 tablet (25 mg) by mouth 2 times daily 180 tablet 1     atorvastatin (LIPITOR) 40 MG tablet Take 1 tablet (40 mg) by mouth daily 90 tablet 2     omeprazole (PRILOSEC) 40 MG capsule Take 1 capsule (40 mg) by mouth daily 90 capsule 3     albuterol (2.5 MG/3ML) 0.083% neb solution Take 1 vial (2.5 mg) by nebulization every 6 hours as needed for shortness of breath / dyspnea or wheezing 360 mL 0     albuterol (PROAIR HFA/PROVENTIL HFA/VENTOLIN HFA) 108 (90 BASE) MCG/ACT  Inhaler Inhale 2 puffs into the lungs every 6 hours as needed for shortness of breath / dyspnea or wheezing 1 Inhaler 3     IBUPROFEN IB OR Take 1,000 mg by mouth daily as needed       furosemide (LASIX) 40 MG tablet Take 1 tablet (40 mg) by mouth daily Some days patient takes (2) tabs. (Patient taking differently: Take 40 mg by mouth as needed (Pt taking on a  PRN basis) Some days patient takes (2) tabs.) 30 tablet 1     aspirin 81 MG tablet Take 81 mg by mouth daily       [DISCONTINUED] levofloxacin (LEVAQUIN) 750 MG tablet Take 1 tablet (750 mg) by mouth daily 10 tablet 0     [DISCONTINUED] guaiFENesin-codeine (ROBITUSSIN AC) 100-10 MG/5ML SOLN solution Take 5 mLs by mouth every 4 hours as needed for cough 120 mL 0     No facility-administered encounter medications on file as of 2/5/2018.        Again, thank you for allowing me to participate in the care of your patient.      Sincerely,    Sully Haney DO     [facility]    cc:   Sully Haney DO  [address]  [place] [address]

## 2018-04-19 ENCOUNTER — TELEPHONE (OUTPATIENT)
Dept: MAMMOGRAPHY | Facility: CLINIC | Age: 68
End: 2018-04-19

## 2018-04-19 NOTE — TELEPHONE ENCOUNTER
4/19/2018    Attempt 1    Contacted patient in regards to scheduling VIP mammogram  Message on voicemail     Comments:       Outreach   SHIRLEY

## 2018-06-02 DIAGNOSIS — I10 HTN (HYPERTENSION): ICD-10-CM

## 2018-06-02 NOTE — LETTER
[facility]  June 5, 2018       [place] [address]           Phone :  [phone]          Fax:  [phone]  Calista OMAR Atkinson  [address] [place] [id number]      Dear Calista,    We recently received a call from your pharmacy requesting a refill of your medication - HCTZ.    A review of your chart indicates that an appointment is required with the lab for your yearly blood pressure blood work. Please call the clinic at [phone] to schedule your lab only appointment.    We have authorized one refill of your medication to allow time for you to schedule your appointment.    Taking care of your health is important to us, and ongoing visits with your provider are vital to your care.  We look forward to seeing you in the near future.        Sincerely,        Twyla Nick MD / Aide Fair RN

## 2018-06-04 NOTE — TELEPHONE ENCOUNTER
"Requested Prescriptions   Pending Prescriptions Disp Refills     lisinopril-hydrochlorothiazide (PRINZIDE/ZESTORETIC) 20-12.5 MG per tablet [Pharmacy Med Name: LISINOPRIL/HCTZ 20-12.5MG TAB] 90 tablet 1    Last Written Prescription Date:  9/26/17  Last Fill Quantity: 90,  # refills: 1   Last Office Visit: 1/16/2018   Future Office Visit:      Sig: TAKE ONE TABLET BY MOUTH ONCE DAILY    Diuretics (Including Combos) Protocol Failed    6/2/2018 10:42 AM       Failed - Normal serum creatinine on file in past 12 months    Recent Labs   Lab Test  04/18/17   0758   CR  0.66             Failed - Normal serum potassium on file in past 12 months    Recent Labs   Lab Test  04/18/17   0758   POTASSIUM  4.0                   Failed - Normal serum sodium on file in past 12 months    Recent Labs   Lab Test  04/18/17   0758   NA  140             Passed - Blood pressure under 140/90 in past 12 months    BP Readings from Last 3 Encounters:   02/05/18 116/80   01/16/18 112/76   10/12/17 108/70                Passed - Recent (12 mo) or future (30 days) visit within the authorizing provider's specialty    Patient had office visit in the last 12 months or has a visit in the next 30 days with authorizing provider or within the authorizing provider's specialty.  See \"Patient Info\" tab in inbasket, or \"Choose Columns\" in Meds & Orders section of the refill encounter.           Passed - Patient is age 18 or older       Passed - No active pregancy on record       Passed - No positive pregnancy test in past 12 months          "

## 2018-06-05 RX ORDER — LISINOPRIL AND HYDROCHLOROTHIAZIDE 12.5; 2 MG/1; MG/1
TABLET ORAL
Qty: 90 TABLET | Refills: 0 | Status: SHIPPED | OUTPATIENT
Start: 2018-06-05 | End: 2018-10-09

## 2018-06-05 NOTE — TELEPHONE ENCOUNTER
Medication is being filled for 1 time refill only due to:  Patient needs labs yearly creatinine, potassium, sodium. Future labs ordered comprehensive metabolic panel. Letter sent to patient.   Aide Fair RN

## 2018-06-12 ENCOUNTER — TELEPHONE (OUTPATIENT)
Dept: FAMILY MEDICINE | Facility: CLINIC | Age: 68
End: 2018-06-12

## 2018-06-12 NOTE — TELEPHONE ENCOUNTER
Panel Management Review      Patient has the following on her problem list:     Hypertension   Last three blood pressure readings:  BP Readings from Last 3 Encounters:   02/05/18 116/80   01/16/18 112/76   10/12/17 108/70     Blood pressure: Passed    HTN Guidelines:  Age 18-59 BP range:  Less than 140/90  Age 60-85 with Diabetes:  Less than 140/90  Age 60-85 without Diabetes:  less than 150/90      Composite cancer screening  Chart review shows that this patient is due/due soon for the following Mammogram and Fecal Colorectal (FIT)  Summary:    Patient is due/failing the following:   FIT and MAMMOGRAM    Action needed:   Patient needs referral/order: FIT Test and Mammogram     Type of outreach:    None, routed to provider for review. Patient has a upcoming appointment scheduled on 07/10/18.     Questions for provider review:    None                                                                                                                                    Lisa Magill, CMA       Chart routed to Provider .

## 2018-07-09 NOTE — PROGRESS NOTES
SUBJECTIVE:   Calista Atkinson is a 68 year old female who presents for Preventive Visit.  Are you in the first 12 months of your Medicare coverage?  No    Physical   Annual:     Getting at least 3 servings of Calcium per day:  Yes    Bi-annual eye exam:  NO    Dental care twice a year:  NO    Sleep apnea or symptoms of sleep apnea:  Daytime drowsiness    Diet:  Regular (no restrictions)    Taking medications regularly:  Yes    Medication side effects:  None    Additional concerns today:  No    Ability to successfully perform activities of daily living: no assistance needed    Home Safety:  No safety concerns identified    Hearing Impairment: no hearing concerns    Fall risk:  Fallen 2 or more times in the past year?: No  Any fall with injury in the past year?: No    COGNITIVE SCREEN  1) Repeat 3 items (Leader, Season, Table)    2) Clock draw: NORMAL  3) 3 item recall: Recalls 3 objects  Results: 3 items recalled: COGNITIVE IMPAIRMENT LESS LIKELY    Mini-CogTM Copyright S Young. Licensed by the author for use in [facility] [employer]; reprinted with permission (kevin@[facility]). All rights reserved.    Breast cancer screening  Last mammogram was 7/21/2015.     Neck pain  Patient reports constant upper neck pain. She also notes shoulder pain. This has been affecting her sleep so she has been fatigued. She is currently seeing a chiropractor once a month which helps but she is still in a lot of pain. She does the exercises the chiropractor recommends. Notes that it is starting to affect her life, she notes decreased range of motion. She has tried buying a new pillow, but this did not help. Patient notes that she usually reads in bed on her side or sleeps on her side. She takes 1,000 mg of extra strength tylenol at night and again in the morning. She usually wakes up in the mornings in a lot of pain in the neck.     Colon cancer screening  Last FIT test was 3/8/2017-negative.     COPD  She reports SOB with activity or long  distance walking. She is not using an inhaler. Patient relates that she also thinks the SOB is related to her being overweight. She is not as active as she used to be and lately she has been staying inside due to the heat and humidity- it makes it difficult for her to breathe. She smokes about 5 cigarettes a day and relates that she does not think she could quit because she enjoys doing so.     Weight  Patient comments multiple times that she wishes to lose weight but is unable to do so.     Peripheral edema  She has not been taking lasix 40 mg daily because it causes frequent urination. She usually avoids salt but admits to eating a lot of bread. She is currently on HCTZ 12.5 mg daily for BP management. Overall she thinks the edema is still well managed.     Other problems to add on...  -Patient has not had the shingles vaccine. She wishes to hold off on this--she does not like shots.     Reviewed and updated as needed this visit by clinical staff  Tobacco  Allergies  Meds  Problems  Med Hx  Surg Hx  Fam Hx  Soc Hx          Reviewed and updated as needed this visit by Provider  Meds  Problems        Social History   Substance Use Topics     Smoking status: Current Every Day Smoker     Packs/day: 0.50     Types: Cigarettes     Smokeless tobacco: Never Used      Comment: 4-5 cig daily.      Alcohol use No     Alcohol Use 7/10/2018   If you drink alcohol do you typically have greater than 3 drinks per day OR greater than 7 drinks per week? Not Applicable     Today's PHQ-2 Score:   PHQ-2 ( 1999 Pf[employer]) 7/10/2018   Q1: Little interest or pleasure in doing things 1   Q2: Feeling down, depressed or hopeless 1   PHQ-2 Score 2   Q1: Little interest or pleasure in doing things Several days   Q2: Feeling down, depressed or hopeless Several days   PHQ-2 Score 2     Do you feel safe in your environment - YES    Do you have a Health Care Directive?: Yes: Patient states has Advance Directive and will bring in a copy to  clinic. [facility]IV[facility]     Current providers sharing in care for this patient include:   Patient Care Team:  Twyla Nick MD as PCP - General (Family Practice)    The following health maintenance items are reviewed in Epic and correct as of today:  Health Maintenance   Topic Date Due     COPD ACTION PLAN Q1 YR  1950     DEXA SCAN SCREENING (SYSTEM ASSIGNED)  05/20/2014     MAMMO SCREEN Q2 YR (SYSTEM ASSIGNED)  07/15/2017     FIT Q1 YR  03/08/2018     INFLUENZA VACCINE (1) 09/01/2018     FALL RISK ASSESSMENT  07/10/2019     PHQ-2 Q1 YR  07/10/2019     ADVANCE DIRECTIVE PLANNING Q5 YRS  05/05/2021     LIPID SCREEN Q5 YR FEMALE (SYSTEM ASSIGNED)  02/08/2022     TETANUS IMMUNIZATION (SYSTEM ASSIGNED)  01/12/2026     SPIROMETRY ONETIME  Completed     PNEUMOCOCCAL  Completed     HEPATITIS C SCREENING  Completed     Labs reviewed in EPIC  BP Readings from Last 3 Encounters:   07/10/18 114/80   02/05/18 116/80   01/16/18 112/76    Wt Readings from Last 3 Encounters:   07/10/18 124.9 kg (275 lb 4.8 oz)   02/05/18 127.2 kg (280 lb 6.4 oz)   01/16/18 127 kg (280 lb)         Mammogram Screening: Patient over age 50, mutual decision to screen reflected in health maintenance.    Review of Systems  CONSTITUTIONAL: NEGATIVE for fever, chills, change in weight  INTEGUMENTARY/SKIN: NEGATIVE for worrisome rashes, moles or lesions  EYES: NEGATIVE for vision changes or irritation  ENT/MOUTH: NEGATIVE for ear, mouth and throat problems  RESP: POSITIVE for SOB with activity NEGATIVE for significant cough   BREAST: NEGATIVE for masses, tenderness or discharge  CV: NEGATIVE for chest pain, palpitations or peripheral edema  GI: NEGATIVE for nausea, abdominal pain, heartburn, or change in bowel habits  : NEGATIVE for frequency, dysuria, or hematuria  MUSCULOSKELETAL: POSITIVE for neck pain   NEURO: NEGATIVE for weakness, dizziness or paresthesias  ENDOCRINE: NEGATIVE for temperature intolerance, skin/hair changes  HEME:  "NEGATIVE for bleeding problems  PSYCHIATRIC: NEGATIVE for changes in mood or affect    This document serves as a record of the services and decisions personally performed and made by Twyla Nick MD. It was created on her behalf by Donita Elliott, a trained medical scribe. The creation of this document is based on the provider's statements to the medical scribe.  Donita Elliott July 10, 2018 9:43 AM     OBJECTIVE:   /80 (BP Location: Right arm, Patient Position: Chair, Cuff Size: Adult Large)  Pulse 84  Temp 97.9  F (36.6  C) (Oral)  Resp 20  Wt 124.9 kg (275 lb 4.8 oz)  SpO2 97%  BMI 45.12 kg/m2 Estimated body mass index is 45.12 kg/(m^2) as calculated from the following:    Height as of 2/5/18: 1.664 m (5' 5.5\").    Weight as of this encounter: 124.9 kg (275 lb 4.8 oz).     Physical Exam  GENERAL APPEARANCE: healthy, alert and no distress  EYES: Eyes grossly normal to inspection, and conjunctivae and sclerae normal  HENT: ear canals and TM's normal, nose and mouth without ulcers or lesions, oropharynx clear and oral mucous membranes moist  NECK: no adenopathy, no asymmetry, masses, or scars and thyroid normal to palpation  RESP: lungs clear to auscultation - no rales, rhonchi or wheezes  BREAST: normal without masses, tenderness or nipple discharge and no palpable axillary masses or adenopathy  CV: regular rate and rhythm, normal S1 S2, no S3 or S4, no murmur, click or rub, no peripheral edema and peripheral pulses strong  ABDOMEN: soft, nontender, no hepatosplenomegaly, no masses and bowel sounds normal. Abdominal fullness noted, no rebound or guarding. Mid to left abd?  MS: no musculoskeletal defects are noted and gait is age appropriate without ataxia  SKIN: no suspicious lesions or rashes  NEURO: Normal strength and tone, sensory exam grossly normal, mentation intact and speech normal  PSYCH: mentation appears normal and affect normal/bright    Diagnostic Test Results:  none     ASSESSMENT / " PLAN:   (Z00.00) Medicare annual wellness visit, subsequent  (primary encounter diagnosis)  Comment: Overall normal physical exam other than the abdominal fullness noted on exam. Non-fasting lab work up today  Plan: Comprehensive metabolic panel, TSH with free T4        reflex, CBC with platelets, Vitamin D         Deficiency          (J44.9) Chronic obstructive pulmonary disease, unspecified COPD type (H)  Comment: Adding on spiriva once daily and combivent prn due to SOB with activity. Highly encouraged patient to cease tobacco use, patient agreed to try Chantix  Plan: COPD ACTION PLAN, tiotropium (SPIRIVA         HANDIHALER) 18 MCG capsule,         Ipratropium-Albuterol (COMBIVENT RESPIMAT)          MCG/ACT inhaler          (Z78.0) Asymptomatic postmenopausal status  Comment: DEXA scan ordered  Plan: DEXA HIP/PELVIS/SPINE - Future          (Z12.31) Visit for screening mammogram  Comment: Mammogram ordered today  Plan: MA SCREENING DIGITAL BILAT - Future  (s+30)          (E78.2) Mixed hyperlipidemia  Comment: Non-fasting lab work up today. If within target range, continue current medications  Plan: atorvastatin (LIPITOR) 40 MG tablet, Lipid         panel reflex to direct LDL Non-fasting          (M54.2) Cervicalgia  Comment: Adding mobic. May increase extra strength tylenol to 2 tablets 3x daily. Recommend following up with Pain Clinic or PT. Avoid ibu type meds with this drug. Potential medication side effects were discussed with the patient; let me know if any occur.    Plan: PAIN MANAGEMENT REFERRAL, XR Cervical Spine 2/3        Views, meloxicam (MOBIC) 7.5 MG tablet          (E66.01) Morbid obesity (H)  Comment: Referred to a weight loss specialist to help with both nutrition and medications for weight loss. Does not want surgery  Plan: BARIATRIC ADULT REFERRAL          (Z12.11) Screen for colon cancer  Comment: FIT test ordered  Plan: Fecal colorectal cancer screen FIT - Future         (S+30)         "  (R10.84) Abdominal pain, generalized  Comment: Abdominal fullness noted on physical exam. US ordered today  Plan: US Abdomen Complete          (Z87.627) Personal history of tobacco use, presenting hazards to health  Comment: Starting chantix. Reviewed potential side effects of medication, will let me know if experiencing any.  Plan: varenicline (CHANTIX STARTING MONTH ANNA) 0.5 MG        X 11 & 1 MG X 42 tablet          Follow up in 3 months    End of Life Planning:  Patient currently has an advanced directive: Yes.  Practitioner is supportive of decision.    COUNSELING:  Reviewed preventive health counseling, as reflected in patient instructions       Regular exercise       Healthy diet/nutrition       Immunizations       Colon cancer screening    BP Readings from Last 1 Encounters:   07/10/18 114/80     Estimated body mass index is 45.12 kg/(m^2) as calculated from the following:    Height as of 2/5/18: 1.664 m (5' 5.5\").    Weight as of this encounter: 124.9 kg (275 lb 4.8 oz).  Weight management plan: Patient referred to endocrine and/or weight management specialty   reports that she has been smoking Cigarettes.  She has been smoking about 0.50 packs per day. She has never used smokeless tobacco.  Tobacco Cessation Action Plan: Pharmacotherapies : Chantix    Appropriate preventive services were discussed with this patient, including applicable screening as appropriate for cardiovascular disease, diabetes, osteopenia/osteoporosis, and glaucoma.  As appropriate for age/gender, discussed screening for colorectal cancer, prostate cancer, breast cancer, and cervical cancer. Checklist reviewing preventive services available has been given to the patient.    Reviewed patients plan of care and provided an AVS. The Basic Care Plan (routine screening as documented in Health Maintenance) for Calista meets the Care Plan requirement. This Care Plan has been established and reviewed with the Patient.    Counseling " Resources:  ATP IV Guidelines  Pooled Cohorts Equation Calculator  Breast Cancer Risk Calculator  FRAX Risk Assessment  ICSI Preventive Guidelines  Dietary Guidelines for Americans, 2010  USDA's MyPlate  ASA Prophylaxis  Lung CA Screening    The information in this document, created by the medical scribe for me, accurately reflects the services I personally performed and the decisions made by me. I have reviewed and approved this document for accuracy prior to leaving the patient care area.  July 10, 2018 9:43 AM    Twyla Nick MD  [facility]

## 2018-07-10 ENCOUNTER — RADIANT APPOINTMENT (OUTPATIENT)
Dept: GENERAL RADIOLOGY | Facility: CLINIC | Age: 68
End: 2018-07-10
Attending: FAMILY MEDICINE
Payer: COMMERCIAL

## 2018-07-10 ENCOUNTER — TELEPHONE (OUTPATIENT)
Dept: PALLIATIVE MEDICINE | Facility: CLINIC | Age: 68
End: 2018-07-10

## 2018-07-10 ENCOUNTER — OFFICE VISIT (OUTPATIENT)
Dept: FAMILY MEDICINE | Facility: CLINIC | Age: 68
End: 2018-07-10
Payer: COMMERCIAL

## 2018-07-10 VITALS
SYSTOLIC BLOOD PRESSURE: 114 MMHG | HEART RATE: 84 BPM | DIASTOLIC BLOOD PRESSURE: 80 MMHG | WEIGHT: 275.3 LBS | TEMPERATURE: 97.9 F | BODY MASS INDEX: 45.12 KG/M2 | RESPIRATION RATE: 20 BRPM | OXYGEN SATURATION: 97 %

## 2018-07-10 DIAGNOSIS — Z12.11 SCREEN FOR COLON CANCER: ICD-10-CM

## 2018-07-10 DIAGNOSIS — R10.84 ABDOMINAL PAIN, GENERALIZED: ICD-10-CM

## 2018-07-10 DIAGNOSIS — M54.2 CERVICALGIA: ICD-10-CM

## 2018-07-10 DIAGNOSIS — Z87.891 PERSONAL HISTORY OF TOBACCO USE, PRESENTING HAZARDS TO HEALTH: ICD-10-CM

## 2018-07-10 DIAGNOSIS — Z12.31 VISIT FOR SCREENING MAMMOGRAM: ICD-10-CM

## 2018-07-10 DIAGNOSIS — J44.9 CHRONIC OBSTRUCTIVE PULMONARY DISEASE, UNSPECIFIED COPD TYPE (H): ICD-10-CM

## 2018-07-10 DIAGNOSIS — E78.2 MIXED HYPERLIPIDEMIA: ICD-10-CM

## 2018-07-10 DIAGNOSIS — Z78.0 ASYMPTOMATIC POSTMENOPAUSAL STATUS: ICD-10-CM

## 2018-07-10 DIAGNOSIS — E66.01 MORBID OBESITY (H): ICD-10-CM

## 2018-07-10 DIAGNOSIS — Z00.00 MEDICARE ANNUAL WELLNESS VISIT, SUBSEQUENT: Primary | ICD-10-CM

## 2018-07-10 LAB
ERYTHROCYTE [DISTWIDTH] IN BLOOD BY AUTOMATED COUNT: 12.8 % (ref 10–15)
HCT VFR BLD AUTO: 46 % (ref 35–47)
HGB BLD-MCNC: 15.8 G/DL (ref 11.7–15.7)
MCH RBC QN AUTO: 31.2 PG (ref 26.5–33)
MCHC RBC AUTO-ENTMCNC: 34.3 G/DL (ref 31.5–36.5)
MCV RBC AUTO: 91 FL (ref 78–100)
PLATELET # BLD AUTO: 187 10E9/L (ref 150–450)
RBC # BLD AUTO: 5.07 10E12/L (ref 3.8–5.2)
WBC # BLD AUTO: 7.9 10E9/L (ref 4–11)

## 2018-07-10 PROCEDURE — 99213 OFFICE O/P EST LOW 20 MIN: CPT | Mod: 25 | Performed by: FAMILY MEDICINE

## 2018-07-10 PROCEDURE — 36415 COLL VENOUS BLD VENIPUNCTURE: CPT | Performed by: FAMILY MEDICINE

## 2018-07-10 PROCEDURE — 82306 VITAMIN D 25 HYDROXY: CPT | Performed by: FAMILY MEDICINE

## 2018-07-10 PROCEDURE — 72040 X-RAY EXAM NECK SPINE 2-3 VW: CPT | Mod: FY

## 2018-07-10 PROCEDURE — 84443 ASSAY THYROID STIM HORMONE: CPT | Performed by: FAMILY MEDICINE

## 2018-07-10 PROCEDURE — G0439 PPPS, SUBSEQ VISIT: HCPCS | Performed by: FAMILY MEDICINE

## 2018-07-10 PROCEDURE — 85027 COMPLETE CBC AUTOMATED: CPT | Performed by: FAMILY MEDICINE

## 2018-07-10 PROCEDURE — 80053 COMPREHEN METABOLIC PANEL: CPT | Performed by: FAMILY MEDICINE

## 2018-07-10 PROCEDURE — 80061 LIPID PANEL: CPT | Performed by: FAMILY MEDICINE

## 2018-07-10 RX ORDER — MELOXICAM 7.5 MG/1
7.5 TABLET ORAL DAILY
Qty: 30 TABLET | Refills: 11 | Status: SHIPPED | OUTPATIENT
Start: 2018-07-10 | End: 2018-10-15

## 2018-07-10 RX ORDER — ATORVASTATIN CALCIUM 40 MG/1
40 TABLET, FILM COATED ORAL DAILY
Qty: 90 TABLET | Refills: 3 | Status: SHIPPED | OUTPATIENT
Start: 2018-07-10 | End: 2018-10-15

## 2018-07-10 RX ORDER — TIOTROPIUM BROMIDE 18 UG/1
CAPSULE ORAL; RESPIRATORY (INHALATION)
Qty: 90 CAPSULE | Refills: 3 | Status: SHIPPED | OUTPATIENT
Start: 2018-07-10 | End: 2018-10-15

## 2018-07-10 ASSESSMENT — ACTIVITIES OF DAILY LIVING (ADL)
I_NEED_ASSISTANCE_FOR_THE_FOLLOWING_DAILY_ACTIVITIES:: NO ASSISTANCE IS NEEDED
CURRENT_FUNCTION: NO ASSISTANCE NEEDED

## 2018-07-10 NOTE — MR AVS SNAPSHOT
After Visit Summary   7/10/2018    Calista Atkinson    MRN: [id number]           Patient Information     Date Of Birth          1950        Visit Information        Provider Department      7/10/2018 9:20 AM Twyla Nick MD [facility]        Today's Diagnoses     Medicare annual wellness visit, subsequent    -  1    Chronic obstructive pulmonary disease, unspecified COPD type (H)        Asymptomatic postmenopausal status        Visit for screening mammogram        Mixed hyperlipidemia        Cervicalgia        Morbid obesity (H)        Screen for colon cancer        Abdominal pain, generalized        Personal history of tobacco use, presenting hazards to health          Care Instructions      Preventive Health Recommendations    Female Ages 65 +    Yearly exam:     See your health care provider every year in order to  o Review health changes.   o Discuss preventive care.    o Review your medicines if your doctor has prescribed any.      You no longer need a yearly Pap test unless you've had an abnormal Pap test in the past 10 years. If you have vaginal symptoms, such as bleeding or discharge, be sure to talk with your provider about a Pap test.      Every 1 to 2 years, have a mammogram.  If you are over 69, talk with your health care provider about whether or not you want to continue having screening mammograms.      Every 10 years, have a colonoscopy. Or, have a yearly FIT test (stool test). These exams will check for colon cancer.       Have a cholesterol test every 5 years, or more often if your doctor advises it.       Have a diabetes test (fasting glucose) every three years. If you are at risk for diabetes, you should have this test more often.       At age 65, have a bone density scan (DEXA) to check for osteoporosis (brittle bone disease).    Shots:    Get a flu shot each year.    Get a tetanus shot every 10 years.    Talk to your doctor about your pneumonia  vaccines. There are now two you should receive - Pneumovax (PPSV 23) and Prevnar (PCV 13).    Talk to your pharmacist about the shingles vaccine.    Talk to your doctor about the hepatitis B vaccine.    Nutrition:     Eat at least 5 servings of fruits and vegetables each day.      Eat whole-grain bread, whole-wheat pasta and brown rice instead of white grains and rice.      Get adequate Calcium and Vitamin D.     Lifestyle    Exercise at least 150 minutes a week (30 minutes a day, 5 days a week). This will help you control your weight and prevent disease.      Limit alcohol to one drink per day.      No smoking.       Wear sunscreen to prevent skin cancer.       See your dentist twice a year for an exam and cleaning.      See your eye doctor every 1 to 2 years to screen for conditions such as glaucoma, macular degeneration and cataracts.          Follow-ups after your visit        Additional Services     BARIATRIC ADULT REFERRAL       Your provider has referred you to: JANENE: [facility] [phone]  [email or web address]    Please be aware that coverage of these services is subject to the terms and limitations of your health insurance plan.  Call member services at your health plan with any benefit or coverage questions.      Please bring the following with you to your appointment:      (1) List of current medications   (2) This referral request   (3) Any documents/labs given to you for this referral            PAIN MANAGEMENT REFERRAL       Your provider has referred you to: JANENE: [facility] -    Reason for Referral: Evaluation for comprehensive services- patients will be evaluated if appropriate for comprehensive service including medication changes, procedures, pain psychology, and pain physical therapy.  While involved with comprehensive services, pain providers will  work with referring provider/PCP to stabilize appropriate medication management, with long-term plan of transition of prescribing back to referring provider/PCP upon completion of comprehensive services.      Please complete the following questions:    Do you have any specific questions for the pain specialist? No    Are there any red flags that may impact the assessment or management of the patient? None      What is your diagnosis for the patient's pain? NECK PAIN, oa      For any questions, contact the [facility] at [phone].     **ANY DIAGNOSTIC TESTS THAT ARE NOT IN EPIC SHOULD BE SENT TO THE PAIN CENTER**    REGARDING OPIOID MEDICATIONS:  The discussion of opioids management, appropriateness of therapy, and dosing will be discussed in patients being seen for evaluation.  The pain management clinics are not long-term prescribing clinics, with transition of prescribing of medications ultimately going back to the referring provider/PCP.  If prescribing is taken over at the pain clinic, it is in actively involved patients whom are appropriate for opioids, urine drug screening is completed, and long-term prescribing plan has been determined.  Therefore, we will not be automatically taking over prescribing at the patient's first visit.  Is this agreeable to you? agrees.     Please be aware that coverage of these services is subject to the terms and limitations of your health insurance plan.  Call member services at your health plan with any benefit or coverage questions.      Please bring the following with you to your appointment:    (1) Any X-Rays, CTs or MRIs which have been performed.  Contact the facility where they were done to arrange for  prior to your scheduled appointment.    (2) List of current medications   (3) This referral request   (4) Any documents/labs given to you for this referral                  Follow-up notes from your care team     Return in about 3 months  (around 10/10/2018).      Future tests that were ordered for you today     Open Future Orders        Priority Expected Expires Ordered    US Abdomen Complete Routine  7/10/2019 7/10/2018    DEXA HIP/PELVIS/SPINE - Future Routine  7/9/2019 7/10/2018    MA SCREENING DIGITAL BILAT - Future  (s+30) Routine  7/9/2019 7/10/2018    Fecal colorectal cancer screen FIT - Future (S+30) Routine 7/30/2018 8/8/2018 7/10/2018            Who to contact     If you have questions or need follow up information about today's clinic visit or your schedule please contact [facility] directly at [phone].  Normal or non-critical lab and imaging results will be communicated to you by PAYMEYhart, letter or phone within 4 business days after the clinic has received the results. If you do not hear from us within 7 days, please contact the clinic through [employer] or phone. If you have a critical or abnormal lab result, we will notify you by phone as soon as possible.  Submit refill requests through Nambii or call your pharmacy and they will forward the refill request to us. Please allow 3 business days for your refill to be completed.          Additional Information About Your Visit        Nambii Information     [employer]ii gives you secure access to your electronic health record. If you see a primary care provider, you can also send messages to your care team and make appointments. If you have questions, please call your primary care clinic.  If you do not have a primary care provider, please call [phone] and they will assist you.        Care EveryWhere ID     This is your Care EveryWhere ID. This could be used by other organizations to access your [place] medical records  [id number]        Your Vitals Were     Pulse Temperature Respirations Pulse Oximetry BMI (Body Mass Index)       84 97.9  F (36.6  C) (Oral) 20 97% 45.12 kg/m2        Blood Pressure from Last 3 Encounters:   07/10/18 114/80   02/05/18 116/80   01/16/18  112/76    Weight from Last 3 Encounters:   07/10/18 275 lb 4.8 oz (124.9 kg)   02/05/18 280 lb 6.4 oz (127.2 kg)   01/16/18 280 lb (127 kg)              We Performed the Following     BARIATRIC ADULT REFERRAL     CBC with platelets     Comprehensive metabolic panel     COPD ACTION PLAN     Lipid panel reflex to direct LDL Non-fasting     PAIN MANAGEMENT REFERRAL     TSH with free T4 reflex     Vitamin D Deficiency          Today's Medication Changes          These changes are accurate as of 7/10/18 10:09 AM.  If you have any questions, ask your nurse or doctor.               Start taking these medicines.        Dose/Directions    Ipratropium-Albuterol  MCG/ACT inhaler   Commonly known as:  COMBIVENT RESPIMAT   Used for:  Chronic obstructive pulmonary disease, unspecified COPD type (H)   Started by:  Twyla Nick MD        Dose:  1 puff   Inhale 1 puff into the lungs every 4 hours as needed for shortness of breath / dyspnea or wheezing Not to exceed 6 doses per day.   Quantity:  3 Inhaler   Refills:  3       meloxicam 7.5 MG tablet   Commonly known as:  MOBIC   Used for:  Cervicalgia   Started by:  Twyla Nick MD        Dose:  7.5 mg   Take 1 tablet (7.5 mg) by mouth daily   Quantity:  30 tablet   Refills:  11       tiotropium 18 MCG capsule   Commonly known as:  SPIRIVA HANDIHALER   Used for:  Chronic obstructive pulmonary disease, unspecified COPD type (H)   Started by:  Twyla Nick MD        Inhale contents of one capsule daily.   Quantity:  90 capsule   Refills:  3       varenicline 0.5 MG X 11 & 1 MG X 42 tablet   Commonly known as:  CHANTIX STARTING MONTH PAK   Used for:  Personal history of tobacco use, presenting hazards to health   Started by:  Twyla Nick MD        Take 0.5 mg tab daily for 3 days, then 0.5 mg tab twice daily for 4 days, then 1 mg twice daily.   Quantity:  53 tablet   Refills:  0         These medicines have changed or have updated  prescriptions.        Dose/Directions    metoprolol tartrate 25 MG tablet   Commonly known as:  LOPRESSOR   This may have changed:  when to take this   Used for:  Essential hypertension        Dose:  25 mg   Take 1 tablet (25 mg) by mouth 2 times daily   Quantity:  180 tablet   Refills:  1            Where to get your medicines      These medications were sent to [id number] - [place], [place] - [address] [facility]  [address] [facility] [address] [facility] [address]     Phone:  [phone]     atorvastatin 40 MG tablet    Ipratropium-Albuterol  MCG/ACT inhaler    meloxicam 7.5 MG tablet    tiotropium 18 MCG capsule    varenicline 0.5 MG X 11 & 1 MG X 42 tablet                Primary Care Provider Office Phone # Fax #    Twyla Nick [phone]  [address]   [facility] [address]        Equal Access to Services     [facility]: Hadii aad ku hadasho Soomaali, waaxda luqadaha, qaybta kaalmada adeegyada, waxay lesleein hayaan adilene poon . So [facility] [phone].    ATENCIÓN: Si habla español, tiene a alberto disposición servicios gratuitos de asistencia lingüística. Avila al [phone].    We comply with applicable federal civil rights laws and [place] laws. We do not discriminate on the basis of race, color, national origin, age, disability, sex, sexual orientation, or gender identity.            Thank you!     Thank you for choosing [facility]  for your care. Our goal is always to provide you with excellent care. Hearing back from our patients is one way we can continue to improve our services. Please take a few minutes to complete the written survey that you may receive in the mail after your visit with us. Thank you!             Your Updated Medication List - Protect others around you: Learn how to safely use, store and throw away your medicines at www.disposemymeds.org.          This list is accurate as of 7/10/18 10:09 AM.  Always use your most recent  med list.                   Brand Name Dispense Instructions for use Diagnosis    * albuterol (2.5 MG/3ML) 0.083% neb solution     360 mL    Take 1 vial (2.5 mg) by nebulization every 6 hours as needed for shortness of breath / dyspnea or wheezing    Chronic obstructive pulmonary disease, unspecified COPD type (H)       * albuterol 108 (90 Base) MCG/ACT Inhaler    PROAIR HFA/PROVENTIL HFA/VENTOLIN HFA    1 Inhaler    Inhale 2 puffs into the lungs every 6 hours as needed for shortness of breath / dyspnea or wheezing    Chronic obstructive pulmonary disease, unspecified COPD type (H)       aspirin 81 MG tablet      Take 81 mg by mouth daily        atorvastatin 40 MG tablet    LIPITOR    90 tablet    Take 1 tablet (40 mg) by mouth daily    Mixed hyperlipidemia       furosemide 40 MG tablet    LASIX    30 tablet    Take 1 tablet (40 mg) by mouth daily Some days patient takes (2) tabs.    HTN (hypertension), CAD (coronary artery disease)       IBUPROFEN IB OR      Take 1,000 mg by mouth daily as needed        Ipratropium-Albuterol  MCG/ACT inhaler    COMBIVENT RESPIMAT    3 Inhaler    Inhale 1 puff into the lungs every 4 hours as needed for shortness of breath / dyspnea or wheezing Not to exceed 6 doses per day.    Chronic obstructive pulmonary disease, unspecified COPD type (H)       lisinopril-hydrochlorothiazide 20-12.5 MG per tablet    PRINZIDE/ZESTORETIC    90 tablet    TAKE ONE TABLET BY MOUTH ONCE DAILY    HTN (hypertension)       meloxicam 7.5 MG tablet    MOBIC    30 tablet    Take 1 tablet (7.5 mg) by mouth daily    Cervicalgia       metoprolol tartrate 25 MG tablet    LOPRESSOR    180 tablet    Take 1 tablet (25 mg) by mouth 2 times daily    Essential hypertension       omeprazole 40 MG capsule    priLOSEC    90 capsule    TAKE ONE CAPSULE BY MOUTH ONCE DAILY    Gastroesophageal reflux disease without esophagitis       tiotropium 18 MCG capsule    SPIRIVA HANDIHALER    90 capsule    Inhale contents of  one capsule daily.    Chronic obstructive pulmonary disease, unspecified COPD type (H)       TYLENOL PO           varenicline 0.5 MG X 11 & 1 MG X 42 tablet    CHANTIX STARTING MONTH PAK    53 tablet    Take 0.5 mg tab daily for 3 days, then 0.5 mg tab twice daily for 4 days, then 1 mg twice daily.    Personal history of tobacco use, presenting hazards to health       * Notice:  This list has 2 medication(s) that are the same as other medications prescribed for you. Read the directions carefully, and ask your doctor or other care provider to review them with you.

## 2018-07-10 NOTE — TELEPHONE ENCOUNTER
Left voicemail for patient to schedule new evaluation.         Daiana CINTRON    [facility]

## 2018-07-10 NOTE — LETTER
July 13, 2018    Calista Atkinson  [address] [address]    Dear Calista                                                                 Welcome to the [facility] at the [facility]. We are located at [address] [address], [address], [place] [address]. Your appointment has been scheduled on Tuesday, July 24TH at 10:00AM with Carola Chagn NP.    At your first visit, you will meet your team of caregivers who will help you to develop pain management strategies that will last a lifetime. You will meet with our support staff to review your insurance information and collect your co-payment if required by your insurance company. You will meet with a medical pain specialist and care coordinator who will assess your pain and develop a plan of care for your successful pain rehabilitation. You should expect to spend 1-2 hours at your first visit with us. Usually, patients work with us for a period of 6-12 months, and eventually return to their primary doctor once their pain management has stabilized.      To help us make your visit go as smoothly as possible, please bring the following items with you on your visit:   Completed Pain Questionnaire enclosed in this packet.  If you do not bring the completed questionnaire, we may have to reschedule your appointment.  List of any medicines that you are currently taking or have been prescribed  Pertinent NON-[facility] medical information such as medical records or tests results (X-rays, or laboratory tests)  Your health insurance card  Financial resources to cover your co-payment or balance due at the time of service (cash, personal check, Visa, and MasterCard are acceptable methods of payment)     Due to the high demand for new patient evaluations, you must notify the scheduling department 48 hours in advance if you are not able to keep this appointment.  Failure to do so could affect your ability to  reschedule with our clinic. Please do not assume that you will receive any prescription medications at your first visit.    Please call [phone] with any questions regarding your appointment. We look forward to meeting you and working to address your health care needs.     Sincerely,      [facility]

## 2018-07-11 LAB
ALBUMIN SERPL-MCNC: 3.6 G/DL (ref 3.4–5)
ALP SERPL-CCNC: 92 U/L (ref 40–150)
ALT SERPL W P-5'-P-CCNC: 18 U/L (ref 0–50)
ANION GAP SERPL CALCULATED.3IONS-SCNC: 9 MMOL/L (ref 3–14)
AST SERPL W P-5'-P-CCNC: 14 U/L (ref 0–45)
BILIRUB SERPL-MCNC: 0.4 MG/DL (ref 0.2–1.3)
BUN SERPL-MCNC: 14 MG/DL (ref 7–30)
CALCIUM SERPL-MCNC: 9.1 MG/DL (ref 8.5–10.1)
CHLORIDE SERPL-SCNC: 103 MMOL/L (ref 94–109)
CHOLEST SERPL-MCNC: 259 MG/DL
CO2 SERPL-SCNC: 25 MMOL/L (ref 20–32)
CREAT SERPL-MCNC: 0.59 MG/DL (ref 0.52–1.04)
DEPRECATED CALCIDIOL+CALCIFEROL SERPL-MC: 28 UG/L (ref 20–75)
GFR SERPL CREATININE-BSD FRML MDRD: >90 ML/MIN/1.7M2
GLUCOSE SERPL-MCNC: 92 MG/DL (ref 70–99)
HDLC SERPL-MCNC: 49 MG/DL
LDLC SERPL CALC-MCNC: 155 MG/DL
NONHDLC SERPL-MCNC: 210 MG/DL
POTASSIUM SERPL-SCNC: 4.5 MMOL/L (ref 3.4–5.3)
PROT SERPL-MCNC: 7 G/DL (ref 6.8–8.8)
SODIUM SERPL-SCNC: 137 MMOL/L (ref 133–144)
TRIGL SERPL-MCNC: 274 MG/DL
TSH SERPL DL<=0.005 MIU/L-ACNC: 2.25 MU/L (ref 0.4–4)

## 2018-07-13 ENCOUNTER — HOSPITAL ENCOUNTER (OUTPATIENT)
Dept: ULTRASOUND IMAGING | Facility: CLINIC | Age: 68
Discharge: HOME | End: 2018-07-13
Attending: FAMILY MEDICINE | Admitting: FAMILY MEDICINE
Payer: MEDICARE

## 2018-07-13 ENCOUNTER — HOSPITAL ENCOUNTER (OUTPATIENT)
Dept: MAMMOGRAPHY | Facility: CLINIC | Age: 68
Discharge: HOME | End: 2018-07-13
Attending: FAMILY MEDICINE | Admitting: FAMILY MEDICINE
Payer: MEDICARE

## 2018-07-13 DIAGNOSIS — Z12.31 VISIT FOR SCREENING MAMMOGRAM: ICD-10-CM

## 2018-07-13 DIAGNOSIS — R10.84 ABDOMINAL PAIN, GENERALIZED: ICD-10-CM

## 2018-07-13 PROCEDURE — 77063 BREAST TOMOSYNTHESIS BI: CPT

## 2018-07-13 PROCEDURE — 76700 US EXAM ABDOM COMPLETE: CPT

## 2018-07-13 NOTE — TELEPHONE ENCOUNTER
Pain Management Center Referral      1. Confirmed address with patient? Yes  2. Confirmed phone number with patient? Yes  3. Confirmed referring provider? Yes  4. Is the PCP the same as the referring provider? Yes  5. Has the patient been to any previous pain clinics? No  (If yes, send LAZARO with welcome letter)  6. Which insurance are we to bill for this appointment?  Medicare, BCBS    7. Informed pt of cancellation (48 hour) policy? Yes    REGARDING OPIOID MEDICATIONS: We will always address appropriateness of opioid pain medications, but we generally will not automatically take on a prescribing role. When we do take on prescribing of opioids for chronic pain, it is in collaboration with the referring physician for an intermediate period of time (months), with an expectation that the primary physician or provider will assume the prescribing role if medications are effective at stable doses with demonstrated compliance. Therefore, please do not assume that your prescribing responsibilities end on the day of pain clinic consultation.  7. Informed pt of prescribing policy? Yes      8. Referring Provider: Twyla Nick    [facility]

## 2018-07-19 PROCEDURE — G0328 FECAL BLOOD SCRN IMMUNOASSAY: HCPCS | Performed by: FAMILY MEDICINE

## 2018-07-22 DIAGNOSIS — Z12.11 SCREEN FOR COLON CANCER: ICD-10-CM

## 2018-07-22 LAB — HEMOCCULT STL QL IA: NEGATIVE

## 2018-07-23 ENCOUNTER — APPOINTMENT (OUTPATIENT)
Dept: LAB | Facility: CLINIC | Age: 68
End: 2018-07-23
Payer: COMMERCIAL

## 2018-07-23 NOTE — PROGRESS NOTES
"      [facility]     Date of visit: 7/24/18    Reason for consultation:    Calista Atkinson is a 68 year old female who is seen in consultation today at the request of her PCP,  Twyla Nick for evaluation of her pain issues and recommendations for management, with specific emphasis on  Reason for Referral: Evaluation for comprehensive services-     Please complete the following questions:    Do you have any specific questions for the pain specialist? No    Are there any red flags that may impact the assessment or management of the patient? None      What is your diagnosis for the patient's pain? NECK PAIN, oa     Please see the [facility] health questionnaire which the patient completed and reviewed with me in detail.    Review of [place] Prescription Monitoring Program (): No concern for abuse or misuse of controlled medications based on this report.     Pain medications are not currently prescribed.     Subjective:    Chief Complaint:    Chief Complaint   Patient presents with     Pain     pain management evaluation       Pain history:  Calista Atkinson is a 68 year old female who presents for initial evaluation of chief complaint of neck pain.      She first started having problems with neck pain in April of 2018. Insidious onset, without acute precipitating event. She states she initially thought this was due to prolonged reading and her pillow. She purchased a new pillow without benefit. She started working with a chiropractor in May, she feels as though this has been helpful for pain. She continues chiropractic once monthly. She has managed her pain with ES Tylenol, takes BID, \"if I stay on it, I don't have any pain.\"  She was recently seen by her primary care provider for this pain, she was referred to [facility]. She has difficulty with ROM of her neck. The pain is located in bilateral neck and lower head. Denies numbness or tingling. Denies " "weakness.     Pain description:  Location: neck  Quality: aching  Severity/Intensity: 1/10 at best, 10/10 at worst, 1/10 on average  Aggravating factors include: reading, sleeping position, bad posture, turning head  Relieving factors include: not reading, not spending too much time on computer, exercise    The patient otherwise denies bowel or bladder incontinence, parasthesias, weakness, saddle anesthesia, unintentional weight loss, or fever/chills/sweats.     Calista Atkinson has been seen at a pain clinic in the [facility] for chronic right leg pain.     Pain Treatments:  (H--helped; HI--Helped initially; [facility]--Somewhat helpful; NH--No help; W--worse; SE--side effects; ?--Unsure if helpful)   1. Medications:       Current pain medications:   Tylenol 500mg- H BID 4 tabs/day    Lidocaine gel prn- [facility] uses on occasion   meloxicam 7.5mg daily- ?, \"I haven't taken it cause the Tylenol has been doing the trick\"    Current calculated MME: 0    1. Previous Pain Relevant Medications:  NOTE: This medication information taken from patient's intake form, not medical records.    Opiates: Vicodin- H   NSAIDS: ibuprofen- [facility]    Muscle Relaxants: no   Anti-migraine mediations: no   Anti-depressants: Wellbutrin- NH quitting smoking   Sleep aids: no   Anxiolytics: no   Neuropathics: no    Topicals: lidocaine gel- H   Other medications not covered above: no    2. Physical Therapy: no  3. Pain Psychology: no  4. Surgery: no  5. Injections: no  6. Chiropractic: yes- [facility], continues  7. Acupuncture: no  8. TENS Unit: no    Imaging:  X-ray of cervical spine was completed on 7/10/18 and shows:      IMPRESSION: No evidence of acute fracture or malalignment. The  prevertebral soft tissues are unremarkable.       Past Medical History:  Past Medical History:   Diagnosis Date     CAD (coronary artery disease) 6/2012    STEMI and stent      Carotid artery plaque 1/9/2015     Cellulitis      COPD (chronic obstructive pulmonary disease) (H)  "     CVA (cerebral infarction) 2012     Gastro-oesophageal reflux disease      Hyperlipidemia      Hypertension      Myocardial infarction 2012     S/P carotid endarterectomy 1/9/2015       Past Surgical History:  Past Surgical History:   Procedure Laterality Date     ANGIOGRAM  6/6/12    thrombectomy and stent RCA     ENDARTERECTOMY CAROTID Left 3/27/12     HEART CATH LEFT HEART CATH  03/02/15    Evidence of old MI, Continued good results of the stent in mid RCA with mild disease elsewhere in the RCA, Disease in very small branch of the distal CFX.      TONSILLECTOMY      age 9       Medications:  Current Outpatient Prescriptions   Medication Sig Dispense Refill     Acetaminophen (TYLENOL PO)        aspirin 81 MG tablet Take 81 mg by mouth daily       atorvastatin (LIPITOR) 40 MG tablet Take 1 tablet (40 mg) by mouth daily 90 tablet 3     IBUPROFEN IB OR Take 1,000 mg by mouth daily as needed       Ipratropium-Albuterol (COMBIVENT RESPIMAT)  MCG/ACT inhaler Inhale 1 puff into the lungs every 4 hours as needed for shortness of breath / dyspnea or wheezing Not to exceed 6 doses per day. 3 Inhaler 3     lisinopril-hydrochlorothiazide (PRINZIDE/ZESTORETIC) 20-12.5 MG per tablet TAKE ONE TABLET BY MOUTH ONCE DAILY 90 tablet 0     meloxicam (MOBIC) 7.5 MG tablet Take 1 tablet (7.5 mg) by mouth daily 30 tablet 11     metoprolol (LOPRESSOR) 25 MG tablet Take 1 tablet (25 mg) by mouth 2 times daily (Patient taking differently: Take 25 mg by mouth every morning ) 180 tablet 1     omeprazole (PRILOSEC) 40 MG capsule TAKE ONE CAPSULE BY MOUTH ONCE DAILY 90 capsule 3     tiotropium (SPIRIVA HANDIHALER) 18 MCG capsule Inhale contents of one capsule daily. 90 capsule 3     varenicline (CHANTIX STARTING MONTH PAK) 0.5 MG X 11 & 1 MG X 42 tablet Take 0.5 mg tab daily for 3 days, then 0.5 mg tab twice daily for 4 days, then 1 mg twice daily. 53 tablet 0     albuterol (2.5 MG/3ML) 0.083% neb solution Take 1 vial (2.5 mg) by  nebulization every 6 hours as needed for shortness of breath / dyspnea or wheezing (Patient not taking: Reported on 7/10/2018) 360 mL 0     albuterol (PROAIR HFA/PROVENTIL HFA/VENTOLIN HFA) 108 (90 BASE) MCG/ACT Inhaler Inhale 2 puffs into the lungs every 6 hours as needed for shortness of breath / dyspnea or wheezing (Patient not taking: Reported on 7/10/2018) 1 Inhaler 3     furosemide (LASIX) 40 MG tablet Take 1 tablet (40 mg) by mouth daily Some days patient takes (2) tabs. 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  Tobacco use: 4-5 cigarettes/day  Drug use: no  Alcohol use: no  History of chemical dependency treatment: no  Mental health admissions: no    Family history:  Family History   Problem Relation Age of Onset     Myocardial Infarction Mother      Prostate Cancer Father      Cancer Father      Lymphnode     Cancer - colorectal Paternal Grandfather      Alcohol/Drug Son      Alcoholic     Myocardial Infarction Brother      Respiratory Brother      Pneumonia      Family history of headaches: no    Review of Systems:    POSTIVE IN BOLD  GENERAL: fever/chills, fatigue, general unwell feeling, weight gain/loss.  HEAD/EYES:  headache, dizziness, or vision changes.    EARS/NOSE/THROAT: nosebleeds, hearing loss, sinus infection, earache, tinnitus.  IMMUNE:  allergies, cancer, immune deficiency, or infections.  SKIN:  itching, rash, hives  HEME/Lymphatic: anemia, easy bruising, easy bleeding.  RESPIRATORY: cough, wheezing, or shortness of breath  CARDIOVASCULAR/Circulation: extremity edema, syncope, hypertension, tachycardia, or angina.  GASTROINTESTINAL: abdominal pain, nausea/emesis, diarrhea, constipation,  hematochezia, or melena.  ENDOCRINE:  diabetes, steroid use,  thyroid disease or osteoporosis.  MUSCULOSKELETAL: joint pain, stiffness,  neck pain, back pain, arthritis, or gout.  GENITOURINARY: frequency, urgency, dysuria, difficulty voiding, hematuria or incontinence.  NEUROLOGIC: weakness, numbness, paresthesias, seizure, tremor, stroke or memory loss.  PSYCHIATRIC: depression, anxiety, stress, suicidal thoughts or mood swings.     Objective:    Physical Exam:  Vitals:    07/24/18 0921   BP: 110/73   Pulse: 68   SpO2: 96%   Weight: 124.7 kg (275 lb)     Exam:  Constitutional: Well developed, well nourished, appears stated age. Obese.  HEENT: Head atraumatic, normocephalic. Eyes without conjunctival injection or jaundice. Oropharynx clear. Neck supple. No obvious neck masses.  Cardiovascular: Regular rate/rhythm; no murmurs/rubs/gallops appreciated.  Respiratory: Lungs clear to auscultation bilaterally. Good aeration. No wheezing/rales/rhonchi.   Skin: No rash, lesions, or petechiae of exposed skin.   Extremities: Peripheral pulses intact. No clubbing, cyanosis, or edema.  Psychiatric/mental status: Alert, without lethargy or stupor. Speech fluent. Appropriate affect. Mood normal. Able to follow commands without difficulty.     Musculoskeletal exam:  Able to walk on the heels and toes without difficulty. Patient has antalgic gait.   Normal bulk and tone. Unremarkable spinal curvature.     Cervical spine:  Range of motion decreased.   Tenderness in the cervical paraspinal muscles.No  Spurling's negative bilaterally.   Rotation/ext to right: painful   Rotation/ext to left: painful     Thoracic spine:    Kyphosis. Yes   Tenderness in the thoracic paraspinal muscles.No    Lumbar spine:    Flex:  90 degrees   Ext: 10 degrees   Tenderness in the lumbar paraspinal muscles.No   Rotation/ext to right: pain free   Rotation/ext to left: pain free    Myofascial tenderness:  no  Focal tenderness: No SI joint, gluteal, piriformis, or GT tenderness    Neurologic exam:  CN:  Cranial nerves 2-12 are grossly intact  Motor:  5/5 UE and LE strength  Strength:       C4  (shoulder shrug)  symmetric 5/5       C5 (shoulder abduction) symmetric 5/5       C6 (elbow flexion) symmetric 5/5       C7 (elbow extension) symmetric 5/5       C8 (finger abduction, thumb flexion) symmetric 5/5    Reflexes:     Biceps:     R:  2/4 L: 2/4   Brachioradialis   R:  2/4 L: 2/4   Patella:  R:  1/4 L: 1/4   Achilles:  R:  2/4 L: 2/4  Other reflexes:    No ankle clonus     Sensory:   Light touch: normal bilateral upper and lower extremities    Vibration: normal in LE   No allodynia, dysesthesia, or hyperalgesia.    DIRE Score for ongoing opioid management is calculated as follows:   Diagnosis = 2 pts (slowly progressive; moderate pain/objective findings)   Intractability = 2 pts (most treatments tried; patient not fully engaged/barriers)   Risk    Psych = 3 pts (no significant personality dysfunction/mental illness; good communication with clinic)    Chem Hlth = 3 pts (no history of chemical dependency; not drug-focused)   Reliability = 3 pts (highly reliable with meds, appointments, treatments)   Social = 3 pts (supportive family/close relationships; involved in work/school; no isolation)   (Psych + Chem hlth + Reliability + Social) = 16     Efficacy = 2 pts (moderate benefit/function; low med dose; too early/not tried meds)         DIRE Score = 18        7-13: likely NOT suitable candidate for long-term opioid analgesia       14-21: may be a suitable candidate for long-term opioid analgesia     Assessment:  Calista Atkinson is a 68 year old female with a past medical history significant for COPD, GERD, morbid obesity, hyperlipidemia, HTN, CAD, peripheral edema, tobacco use disorder, and CVA who presents with complaints of neck pain.     1. Neck pain- etiology unclear, no abnormalities noted in x-ray. MRI ordered today for further evaluation.   2. Mental Health - the patient's mental health concerns affect her experience of pain and contribute to her clinically significant distress.    1. Chronic neck pain         Plan:  The following recommendations were given to the patient. Diagnosis, treatment options, risks, benefits, and alternatives were discussed, and all questions were answered. The patient expressed understanding of the plan for management.     I am recommending a multidisciplinary treatment plan to help this patient better manage her pain.  This includes:      1.  Pain Physical Therapy:  YES  Calista notes she struggles with neck pain related to activities she enjoys- reading and being on the computer. We discussed the benefit of working with Pain PT. She will schedule first visit with TONY Hutton and try to have 2-3 sessions prior to next visit.    2.  Pain Psychologist to address relaxation, behavioral change, coping style, and other factors important to improvement.  NO- not at this time.    3.  Diagnostic Studies:  I ordered a cervical MRI today for further evaluation. Suspect arthritic changes/facet degenerative changes. I will call/MyChart the results.    4.  Medication Management:     1. Calista reports great relief with Tylenol stating she has no pain if she takes 1000mg BID. I advsied she continue at this dose for now. Could consider substituting a dose of meloxicam to compare the effectiveness of this.  If ineffective in the future, could discuss adding on a daily medication.    5.  Potential procedures: can discuss once have results of MRI.     6.  Continue chiropractic, once monthly as planned.   7.  Follow up with REINA Hopkins CNP in 4 weeks.     Review of Electronic Chart: Today I have also reviewed available medical information in the patient's medical record at [facility] ([facility]), including relevant provider notes, laboratory work, and imaging.       I spent 60 minutes of time face to face with the patient.  Greater than 50% of this time was spent in patient counseling and/or coordination of care regarding principles of multidisciplinary care, medication management, and treatment options as  discussed above.      REINA Hopkins [facility] [place]

## 2018-07-24 ENCOUNTER — OFFICE VISIT (OUTPATIENT)
Dept: PALLIATIVE MEDICINE | Facility: CLINIC | Age: 68
End: 2018-07-24
Payer: COMMERCIAL

## 2018-07-24 VITALS
SYSTOLIC BLOOD PRESSURE: 110 MMHG | OXYGEN SATURATION: 96 % | BODY MASS INDEX: 45.07 KG/M2 | DIASTOLIC BLOOD PRESSURE: 73 MMHG | WEIGHT: 275 LBS | HEART RATE: 68 BPM

## 2018-07-24 DIAGNOSIS — M54.2 CHRONIC NECK PAIN: Primary | ICD-10-CM

## 2018-07-24 DIAGNOSIS — G89.29 CHRONIC NECK PAIN: Primary | ICD-10-CM

## 2018-07-24 PROCEDURE — 99205 OFFICE O/P NEW HI 60 MIN: CPT | Performed by: NURSE PRACTITIONER

## 2018-07-24 ASSESSMENT — PAIN SCALES - GENERAL: PAINLEVEL: MILD PAIN (3)

## 2018-07-24 NOTE — PATIENT INSTRUCTIONS
1.  Pain Physical Therapy:  YES   Schedule first visit with TONY Hutton. Try to have 2-3 sessions prior to next visit.    2.  Pain Psychologist to address relaxation, behavioral change, coping style, and other factors important to improvement.  NO- not at this time.    3.  Diagnostic Studies:  I ordered a cervical MRI today for further evaluation. They will call to schedule, just in case call [phone]. I will call/MyChart the results.    4.  Medication Management:     1. Continue taking Tylenol 1000mg twice daily for pain. Could consider substituting a dose of meloxicam to compare the effectiveness of this.     5.  Potential procedures: can discuss once have results of MRI.     6.  Continue chiropractic, once monthly as planned.   7.  Follow up with REINA Hopkins CNP in 4 weeks.       ----------------------------------------------------------------  Nurse Triage line:  [phone]   Call this number with any questions or concerns. You may leave a detailed message anytime. Calls are typically returned Monday through Friday between 8 AM and 4:30 PM. We usually get back to you within 2 business days depending on the issue/request.       Medication refills:    For non-narcotic medications, call your pharmacy directly to request a refill. The pharmacy will contact the [facility] for authorization. Please allow 3-4 days for these refills to be processed.     For narcotic refills, call the nurse triage line or send a [facility] message. Please contact us 7-10 days before your refill is due. The message MUST include the name of the specific medication(s) requested and how you would like to receive the prescription(s). The options are as follows:    Pain Clinic staff can mail the prescription to your pharmacy. Please tell us the name of the pharmacy.    You may pick the prescription up at the Pain Clinic (tell us the location) or during a clinic visit with your pain provider    Pain Clinic staff can  deliver the prescription to the [facility] in the clinic building. Please tell us the location.      Scheduling number: [phone].  Call this number to schedule or change appointments.    We believe regular attendance is key to your success in our program.    Any time you are unable to keep your appointment we ask that you call us at least 24 hours in advance to let us know. This will allow us to offer the appointment time to another patient.

## 2018-07-24 NOTE — MR AVS SNAPSHOT
After Visit Summary   7/24/2018    Calista Atkinson    MRN: [id number]           Patient Information     Date Of Birth          1950        Visit Information        Provider Department      7/24/2018 10:00 AM Sonya Chang APRN CNP [facility]        Today's Diagnoses     Chronic neck pain    -  1      Care Instructions     1.  Pain Physical Therapy:  YES   Schedule first visit with TONY Hutton. Try to have 2-3 sessions prior to next visit.    2.  Pain Psychologist to address relaxation, behavioral change, coping style, and other factors important to improvement.  NO- not at this time.    3.  Diagnostic Studies:  I ordered a cervical MRI today for further evaluation. They will call to schedule, just in case call [phone]. I will call/MyChart the results.    4.  Medication Management:     1. Continue taking Tylenol 1000mg twice daily for pain. Could consider substituting a dose of meloxicam to compare the effectiveness of this.     5.  Potential procedures: can discuss once have results of MRI.     6.  Continue chiropractic, once monthly as planned.   7.  Follow up with REINA Hopkins CNP in 4 weeks.       ----------------------------------------------------------------  Nurse Triage line:  [phone]   Call this number with any questions or concerns. You may leave a detailed message anytime. Calls are typically returned Monday through Friday between 8 AM and 4:30 PM. We usually get back to you within 2 business days depending on the issue/request.       Medication refills:    For non-narcotic medications, call your pharmacy directly to request a refill. The pharmacy will contact the [facility] for authorization. Please allow 3-4 days for these refills to be processed.     For narcotic refills, call the nurse triage line or send a [facility] message. Please contact us 7-10 days before your refill is due. The message MUST include the name of the specific  medication(s) requested and how you would like to receive the prescription(s). The options are as follows:    Pain Clinic staff can mail the prescription to your pharmacy. Please tell us the name of the pharmacy.    You may pick the prescription up at the Pain Clinic (tell us the location) or during a clinic visit with your pain provider    Pain Clinic staff can deliver the prescription to the [facility] pharmacy in the clinic building. Please tell us the location.      Scheduling number: [phone].  Call this number to schedule or change appointments.    We believe regular attendance is key to your success in our program.    Any time you are unable to keep your appointment we ask that you call us at least 24 hours in advance to let us know. This will allow us to offer the appointment time to another patient.               Follow-ups after your visit        Additional Services     PAIN PT EVAL AND TREAT                 Your next 10 appointments already scheduled     Aug 06, 2018 11:30 AM CDT   DX H[facility]PELVIS/SPINE [facility]    [address]  [address]  [facility] [address] [phone]           Please do not take any of the following 24 hours prior to the day of your exam: vitamins, calcium tablets, antacids.  If possible, please wear clothes without metal (snaps, zippers). A sweatsuit works well.            Oct 15, 2018 10:00 AM CDT   SHORT with Twyla Nick MD   [facility]    [address], [address]  [facility] [address] [phone]              Future tests that were ordered for you today     Open Future Orders        Priority Expected Expires Ordered    MR Cervical Spine w/o Contrast Routine  7/24/2019 7/24/2018            Who to contact     If you have questions or need follow up information about today's clinic visit or your schedule please contact  [facility] directly at [phone].  Normal or non-critical lab and imaging results will be communicated to you by MyChart, letter or phone within 4 business days after the clinic has received the results. If you do not hear from us within 7 days, please contact the clinic through [employer] or phone. If you have a critical or abnormal lab result, we will notify you by phone as soon as possible.  Submit refill requests through [email or web address] or call your pharmacy and they will forward the refill request to us. Please allow 3 business days for your refill to be completed.          Additional Information About Your Visit        [employer] Information     [email or web address] gives you secure access to your electronic health record. If you see a primary care provider, you can also send messages to your care team and make appointments. If you have questions, please call your primary care clinic.  If you do not have a primary care provider, please call [phone] and they will assist you.        Care EveryWhere ID     This is your Care EveryWhere ID. This could be used by other organizations to access your [facility] medical records  [id number]        Your Vitals Were     Pulse Pulse Oximetry BMI (Body Mass Index)             68 96% 45.07 kg/m2          Blood Pressure from Last 3 Encounters:   07/24/18 110/73   07/10/18 114/80   02/05/18 116/80    Weight from Last 3 Encounters:   07/24/18 124.7 kg (275 lb)   07/10/18 124.9 kg (275 lb 4.8 oz)   02/05/18 127.2 kg (280 lb 6.4 oz)              We Performed the Following     PAIN PT EVAL AND TREAT          Today's Medication Changes          These changes are accurate as of 7/24/18 10:03 AM.  If you have any questions, ask your nurse or doctor.               These medicines have changed or have updated prescriptions.        Dose/Directions    metoprolol tartrate 25 MG tablet   Commonly known as:  LOPRESSOR   This may have changed:  when to take this   Used for:  Essential  hypertension        Dose:  25 mg   Take 1 tablet (25 mg) by mouth 2 times daily   Quantity:  180 tablet   Refills:  1                Primary Care Provider Office Phone # Fax #    Twyla Nick [phone]       [id number]  [address]   [facility] [id number]        Equal Access to Services     [facility] DANIEL : Hadii aad ku hadasho Soomaali, waaxda luqadaha, qaybta kaalmada adeegyada, waxromana lesleein hayjamien adelexi varghese cleve . [facility] [phone].    ATENCIÓN: Si habla español, tiene a alberto disposición servicios gratuitos de asistencia lingüística. [facility] [phone].    We comply with applicable federal civil rights laws and [place] laws. We do not discriminate on the basis of race, color, national origin, age, disability, sex, sexual orientation, or gender identity.            Thank you!     Thank you for choosing [facility]  for your care. Our goal is always to provide you with excellent care. Hearing back from our patients is one way we can continue to improve our services. Please take a few minutes to complete the written survey that you may receive in the mail after your visit with us. Thank you!             Your Updated Medication List - Protect others around you: Learn how to safely use, store and throw away your medicines at www.disposemymeds.org.          This list is accurate as of 7/24/18 10:03 AM.  Always use your most recent med list.                   Brand Name Dispense Instructions for use Diagnosis    * albuterol (2.5 MG/3ML) 0.083% neb solution     360 mL    Take 1 vial (2.5 mg) by nebulization every 6 hours as needed for shortness of breath / dyspnea or wheezing    Chronic obstructive pulmonary disease, unspecified COPD type (H)       * albuterol 108 (90 Base) MCG/ACT Inhaler    PROAIR HFA/PROVENTIL HFA/VENTOLIN HFA    1 Inhaler    Inhale 2 puffs into the lungs every 6 hours as needed for shortness of breath / dyspnea or wheezing    Chronic obstructive  pulmonary disease, unspecified COPD type (H)       aspirin 81 MG tablet      Take 81 mg by mouth daily        atorvastatin 40 MG tablet    LIPITOR    90 tablet    Take 1 tablet (40 mg) by mouth daily    Mixed hyperlipidemia       furosemide 40 MG tablet    LASIX    30 tablet    Take 1 tablet (40 mg) by mouth daily Some days patient takes (2) tabs.    HTN (hypertension), CAD (coronary artery disease)       IBUPROFEN IB OR      Take 1,000 mg by mouth daily as needed        Ipratropium-Albuterol  MCG/ACT inhaler    COMBIVENT RESPIMAT    3 Inhaler    Inhale 1 puff into the lungs every 4 hours as needed for shortness of breath / dyspnea or wheezing Not to exceed 6 doses per day.    Chronic obstructive pulmonary disease, unspecified COPD type (H)       lisinopril-hydrochlorothiazide 20-12.5 MG per tablet    PRINZIDE/ZESTORETIC    90 tablet    TAKE ONE TABLET BY MOUTH ONCE DAILY    HTN (hypertension)       meloxicam 7.5 MG tablet    MOBIC    30 tablet    Take 1 tablet (7.5 mg) by mouth daily    Cervicalgia       metoprolol tartrate 25 MG tablet    LOPRESSOR    180 tablet    Take 1 tablet (25 mg) by mouth 2 times daily    Essential hypertension       omeprazole 40 MG capsule    priLOSEC    90 capsule    TAKE ONE CAPSULE BY MOUTH ONCE DAILY    Gastroesophageal reflux disease without esophagitis       tiotropium 18 MCG capsule    SPIRIVA HANDIHALER    90 capsule    Inhale contents of one capsule daily.    Chronic obstructive pulmonary disease, unspecified COPD type (H)       TYLENOL PO           varenicline 0.5 MG X 11 & 1 MG X 42 tablet    CHANTIX STARTING MONTH ANNA    53 tablet    Take 0.5 mg tab daily for 3 days, then 0.5 mg tab twice daily for 4 days, then 1 mg twice daily.    Personal history of tobacco use, presenting hazards to health       * Notice:  This list has 2 medication(s) that are the same as other medications prescribed for you. Read the directions carefully, and ask your doctor or other care provider  to review them with you.

## 2018-07-31 ENCOUNTER — OFFICE VISIT (OUTPATIENT)
Dept: PALLIATIVE MEDICINE | Facility: CLINIC | Age: 68
End: 2018-07-31
Payer: COMMERCIAL

## 2018-07-31 DIAGNOSIS — G89.29 CHRONIC NECK PAIN: Primary | ICD-10-CM

## 2018-07-31 DIAGNOSIS — M54.2 CHRONIC NECK PAIN: Primary | ICD-10-CM

## 2018-07-31 PROCEDURE — G8979 MOBILITY GOAL STATUS: HCPCS | Mod: CK | Performed by: PHYSICAL THERAPIST

## 2018-07-31 PROCEDURE — 97530 THERAPEUTIC ACTIVITIES: CPT | Mod: GP | Performed by: PHYSICAL THERAPIST

## 2018-07-31 PROCEDURE — 97162 PT EVAL MOD COMPLEX 30 MIN: CPT | Mod: GP | Performed by: PHYSICAL THERAPIST

## 2018-07-31 PROCEDURE — G8978 MOBILITY CURRENT STATUS: HCPCS | Mod: CL | Performed by: PHYSICAL THERAPIST

## 2018-07-31 NOTE — PROGRESS NOTES
PHYSICAL THERAPY MEDICARE INITIAL EVALUATION and PLAN OF CARE    Patient Name: Calista Atkinson     : 1950    MRN: [id number]   Pain Management Provider:  REINA Duke CNP  Diagnosis: Chronic neck pain    SUBJECTIVE:    PRESENTATION AND ETIOLOGY    Chief Complaint: Primary complaint of neck pain; right side greater than left; pain radiates to base of skull; started seeing chiropractor and doing exercises; no prior history of neck or headache pain although has had limited AROM of her neck, just not painful    Onset / Etiology: gradual onset in 2018, thought maybe due to pillow or using computer and reading    Pattern Since Onset: Worsened    Pain is described as aching      Frequency: Constant     Intensity: Best 1/10, Worst 10/10;  Current 5/10 ; Average 2/10    LEVEL OF FUNCTION AT START OF CARE    Walking tolerance: 5 minutes; limited due to SOB   Sitting tolerance: hours  Standing tolerance: 5 minutes; whole body aches  Housework tolerance: 10 minutes active/ rest for 10 minutes  Sleep: wakes 2-3x/night to use bathroom  Current Aggrevating Activities / Functional Limitations: reading, sleeping, bad posture and turning head      CURRENT / PREVIOUS INTERVENTION(S):     Relieving Activities / Self Care: avoid reading and using the computer, doing exercise    Previous / Current therapies for current chief complaint: Chiropractic     Prior Functional Level: No functional limitations prior to onset of chief complaint.       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    Patient's goals for physical therapy: get rid of neck pain and be able to read and use computer, turn head for driving    ===============================================================  OBJECTIVE:  POSTURE:  Observation: Patient demonstrates forward  head, protracted shoulders and thoracic kyphosis in standing and sitting posture.  Trunk/hip flexion in standing and walking      GAIT, LOCOMOTION, and BALANCE:  Gait and Locomotion: moderately slow pace with antalgic WB on Right LE  Balance: next    RANGE OF MOTION:   Cervical: AROM limited to 25% bilateral rotation; flexion/extension WFL  Lumbar: next  Shoulders: AROM WFL bilaterally; mild left shoulder restriction at endrange flexion/ER  Hips: next    MUSCLE PERFORMANCE:   Strength: Demonstrates core and scapular instability    Flexibility:  Tightness noted in bilateral upper traps, pectoralis and hip flexors    FUNCTIONAL TESTING/OBSERVATION: indep chair and bed mobility    Pain behaviors: None    ===============================================================  Today's Treatment:  Initial evaluation  Therapeutic Activity:   For 30 minutes including postural awareness and neutral spine in sitting, standing and side lying .  Focus next session:  postural HEP including thoracic extension, pectoralis stretching and shoulder retraction, walking program  ===============================================================  ASSESSMENT:  Physical Therapy Diagnosis:Impaired Posture and Impaired Muscle Performance    Patient requires PT intervention for the following impairments: Limited knowledge of condition and / or self care - inability to control symptoms, Impaired posture / muscle imbalance, Joint hypomobility, Muscle strength and endurance limitations, Pain, ROM limitations and Deconditioning    Anticipated Goals and Expected Outcomes:   8 weeks  Patient will report the use of 2 self care practices during their day.  Patient will report the participation in 20 minutes of aerobic activity daily and practicing stretching daily.  Patient will demonstrate the ability to find core strength in neutral posture.  Patient will demonstrate the ability to relax muscle group before stretching.    16 weeks  Patient will be  independent with a home exercise program.  Patient will be independent with posture correction.  Patient will report independence with a self care/flare management program.  Patient will demonstrate improved functional strength and endurance as reports by increased tolerance for IADLs and more consistent participation in daily activity.       Rehab potential for achieving goals: good.    ===============================================================  PLAN:   Patient will benefit from skilled physical therapy consisting of: neuromuscular reeducation of: kinesthetic sense and posture for sitting and/or standing activities, education in self care / home management training to include instruction in: symptom control techniques, therapeutic activities to achieve improved functional performance in: daily actvities and therapeutic exercise to develop: strength and endurance, flexibility and core stability    Assessment will be ongoing with changes in treatment as indicated.  Benefits/risks/alternatives to treatment have been reviewed and the patient has been instructed to contact this office if they have any questions or concerns.  This plan of care has been discussed with the patient and the patient is in agreement.     Frequency / Duration:  Patient will be seen for a total of 10 visits; 16 weeks    Total Visit Time: 45  minutes            Anni Person, PT                                      Date:  7/31/2018    G Code 1/10  Mobility Status  G 8978 Current Status CL 60-79% impaired  G 8979 Goal Status CK 40-59% impaired    G Codes established based on subjective and objective measures.    =====================================================  **  Referring Provider Certification: Referring Provider reviewing certifies that the above treatment / plan of care is required and authorized, and that the patient's plan will be reviewed every thirty (30) days **.    ======================================================     PRESENT:  NA    MULTIDISCIPLINARY PATIENT / FAMILY EDUCATION RECORD  Department:  Physical Therapy    Readiness to Learn: Ability to understand verbal instructions, Ability to understand written instructions, Knowledge of educational needs / treatment plan  Specific Barriers to Learning: None  Referrals: None  Learning Needs: Rehabilitation techniques to improve functional independence Pain management education to improve daily activity tolerance.  Who: Patient  How: Demonstration, Verbal instructions, Written instructions  Response: Appropriate verbal response, Asked questions, Demonstrated ability, Verbalized recall / understanding

## 2018-07-31 NOTE — MR AVS SNAPSHOT
After Visit Summary   7/31/2018    Calista Atkinson    MRN: [id number]           Patient Information     Date Of Birth          1950        Visit Information        Provider Department      7/31/2018 10:30 AM Anni Person PT [facility]        Today's Diagnoses     Chronic neck pain    -  1       Follow-ups after your visit        Your next 10 appointments already scheduled     Aug 02, 2018 10:30 AM CDT   MR CERVICAL SPINE W/O CONTRAST with [email or web address]   [facility] ([facility])    [address]  [facility] [address]   [phone]           Take your medicines as usual, unless your doctor tells you not to. Bring a list of your current medicines to your exam (including vitamins, minerals and over-the-counter drugs). Also bring the results of similar scans you may have had.  Please remove any body piercings and hair extensions before you arrive.  Follow your doctor s orders. If you do not, we may have to postpone your exam.  You may or may not receive IV contrast for this exam pending the discretion of the Radiologist.  You do not need to do anything special to prepare.  The MRI machine uses a strong magnet. Please wear clothes without metal (snaps, zippers). A sweatsuit works well, or we may give you a hospital gown.   **IMPORTANT** THE INSTRUCTIONS BELOW ARE ONLY FOR THOSE PATIENTS WHO HAVE BEEN PRESCRIBED SEDATION OR GENERAL ANESTHESIA DURING THEIR MRI PROCEDURE:  IF YOUR DOCTOR PRESCRIBED ORAL SEDATION (take medicine to help you relax during your exam):   You must get the medicine from your doctor (oral medication) before you arrive. Bring the medicine to the exam. Do not take it at home. You ll be told when to take it upon arriving for your exam.   Arrive one hour early. Bring someone who can take you home after the test. Your medicine will make you sleepy. After the exam, you may not drive, take a bus or take a taxi by yourself.  IF YOUR DOCTOR  PRESCRIBED IV SEDATION:   Arrive one hour early. Bring someone who can take you home after the test. Your medicine will make you sleepy. After the exam, you may not drive, take a bus or take a taxi by yourself.   No eating 6 hours before your exam. You may have clear liquids up until 4 hours before your exam. (Clear liquids include water, clear tea, black coffee and fruit juice without pulp.)  IF YOUR DOCTOR PRESCRIBED ANESTHESIA (be asleep for your exam):   Arrive 1 1/2 hours early. Bring someone who can take you home after the test. You may not drive, take a bus or take a taxi by yourself.   No eating 8 hours before your exam. You may have clear liquids up until 4 hours before your exam. (Clear liquids include water, clear tea, black coffee and fruit juice without pulp.)   You will spend four to five hours in the recovery room.  Please call the Imaging Department at your exam site with any questions.            Aug 06, 2018 11:30 AM CDT   DX HIP/PELVIS/SPINE with RIDX1   [facility]    [address]  [facility] [id number] [phone]           Please do not take any of the following 24 hours prior to the day of your exam: vitamins, calcium tablets, antacids.  If possible, please wear clothes without metal (snaps, zippers). A sweatsuit works well.            Aug 07, 2018  9:00 AM CDT   New Visit with Anni Person PT   [facility] ([facility])    [address]  [facility] [address]   [phone]            Aug 27, 2018 10:00 AM CDT   Return Visit with REINA Ruvalcaba CNP   [facility] ([facility])    [address]  [facility] [address]   [phone]            Oct 15, 2018 10:00 AM CDT   SHORT with Twyla Nick MD   [facility] ([facility])    [address]  [address]  [facility] [address] [phone]              Who to contact     If you have questions or need follow up information about today's clinic visit or your schedule please contact [facility] directly at [phone].  Normal or non-critical lab and imaging results will be communicated to you by OralWisehart, letter or phone within 4 business days after the clinic has received the results. If you do not hear from us within 7 days, please contact the clinic through Oris4t or phone. If you have a critical or abnormal lab result, we will notify you by phone as soon as possible.  Submit refill requests through LegUP or call your pharmacy and they will forward the refill request to us. Please allow 3 business days for your refill to be completed.          Additional Information About Your Visit        [employer] Information     LegUP gives you secure access to your electronic health record. If you see a primary care provider, you can also send messages to your care team and make appointments. If you have questions, please call your primary care clinic.  If you do not have a primary care provider, please call [phone] and they will assist you.        Care EveryWhere ID     This is your Care EveryWhere ID. This could be used by other organizations to access your [place] medical records  [id number]         Blood Pressure from Last 3 Encounters:   07/24/18 110/73   07/10/18 114/80   02/05/18 116/80    Weight from Last 3 Encounters:   07/24/18 124.7 kg (275 lb)   07/10/18 124.9 kg (275 lb 4.8 oz)   02/05/18 127.2 kg (280 lb 6.4 oz)              We Performed the Following     C MOBILITY CURRENT STATUS     C MOBILITY GOAL STATUS     HC PT EVAL, MODERATE COMPLEXITY     THERAPEUTIC ACTIVITIES          Today's Medication Changes          These changes are accurate as of 7/31/18 11:08 AM.  If you have any questions, ask your nurse or doctor.               These medicines have changed or have updated  prescriptions.        Dose/Directions    metoprolol tartrate 25 MG tablet   Commonly known as:  LOPRESSOR   This may have changed:  when to take this   Used for:  Essential hypertension        Dose:  25 mg   Take 1 tablet (25 mg) by mouth 2 times daily   Quantity:  180 tablet   Refills:  1                Primary Care Provider Office Phone # Fax #    Twyla Edna Nick [phone]       [id number]   [facility] [address]        Equal Access to Services     GEMA SANCHEZ : Hadii aad ku hadasho Soomaali, waaxda luqadaha, qaybta kaalmada adeegyada, waxay idiin hayaan adeeg mildredbreannediego lacory . So [facility] [phone].    ATENCIÓN: Si habla español, tiene a alberto disposición servicios gratuitos de asistencia lingüística. [facility] [phone].    We comply with applicable federal civil rights laws and [place] laws. We do not discriminate on the basis of race, color, national origin, age, disability, sex, sexual orientation, or gender identity.            Thank you!     Thank you for choosing [facility]  for your care. Our goal is always to provide you with excellent care. Hearing back from our patients is one way we can continue to improve our services. Please take a few minutes to complete the written survey that you may receive in the mail after your visit with us. Thank you!             Your Updated Medication List - Protect others around you: Learn how to safely use, store and throw away your medicines at www.disposemymeds.org.          This list is accurate as of 7/31/18 11:08 AM.  Always use your most recent med list.                   Brand Name Dispense Instructions for use Diagnosis    * albuterol (2.5 MG/3ML) 0.083% neb solution     360 mL    Take 1 vial (2.5 mg) by nebulization every 6 hours as needed for shortness of breath / dyspnea or wheezing    Chronic obstructive pulmonary disease, unspecified COPD type (H)       * albuterol 108 (90 Base) MCG/ACT Inhaler    PROAIR  HFA/PROVENTIL HFA/VENTOLIN HFA    1 Inhaler    Inhale 2 puffs into the lungs every 6 hours as needed for shortness of breath / dyspnea or wheezing    Chronic obstructive pulmonary disease, unspecified COPD type (H)       aspirin 81 MG tablet      Take 81 mg by mouth daily        atorvastatin 40 MG tablet    LIPITOR    90 tablet    Take 1 tablet (40 mg) by mouth daily    Mixed hyperlipidemia       furosemide 40 MG tablet    LASIX    30 tablet    Take 1 tablet (40 mg) by mouth daily Some days patient takes (2) tabs.    HTN (hypertension), CAD (coronary artery disease)       IBUPROFEN IB OR      Take 1,000 mg by mouth daily as needed        Ipratropium-Albuterol  MCG/ACT inhaler    COMBIVENT RESPIMAT    3 Inhaler    Inhale 1 puff into the lungs every 4 hours as needed for shortness of breath / dyspnea or wheezing Not to exceed 6 doses per day.    Chronic obstructive pulmonary disease, unspecified COPD type (H)       lisinopril-hydrochlorothiazide 20-12.5 MG per tablet    PRINZIDE/ZESTORETIC    90 tablet    TAKE ONE TABLET BY MOUTH ONCE DAILY    HTN (hypertension)       meloxicam 7.5 MG tablet    MOBIC    30 tablet    Take 1 tablet (7.5 mg) by mouth daily    Cervicalgia       metoprolol tartrate 25 MG tablet    LOPRESSOR    180 tablet    Take 1 tablet (25 mg) by mouth 2 times daily    Essential hypertension       omeprazole 40 MG capsule    priLOSEC    90 capsule    TAKE ONE CAPSULE BY MOUTH ONCE DAILY    Gastroesophageal reflux disease without esophagitis       tiotropium 18 MCG capsule    SPIRIVA HANDIHALER    90 capsule    Inhale contents of one capsule daily.    Chronic obstructive pulmonary disease, unspecified COPD type (H)       TYLENOL PO           varenicline 0.5 MG X 11 & 1 MG X 42 tablet    CHANTIX STARTING MONTH ANNA    53 tablet    Take 0.5 mg tab daily for 3 days, then 0.5 mg tab twice daily for 4 days, then 1 mg twice daily.    Personal history of tobacco use, presenting hazards to health       *  Notice:  This list has 2 medication(s) that are the same as other medications prescribed for you. Read the directions carefully, and ask your doctor or other care provider to review them with you.

## 2018-08-02 ENCOUNTER — HOSPITAL ENCOUNTER (OUTPATIENT)
Dept: MRI IMAGING | Facility: CLINIC | Age: 68
Discharge: HOME | End: 2018-08-02
Attending: NURSE PRACTITIONER | Admitting: NURSE PRACTITIONER
Payer: MEDICARE

## 2018-08-02 DIAGNOSIS — M54.2 CHRONIC NECK PAIN: ICD-10-CM

## 2018-08-02 DIAGNOSIS — G89.29 CHRONIC NECK PAIN: ICD-10-CM

## 2018-08-02 PROCEDURE — 72141 MRI NECK SPINE W/O DYE: CPT

## 2018-08-07 ENCOUNTER — OFFICE VISIT (OUTPATIENT)
Dept: PALLIATIVE MEDICINE | Facility: CLINIC | Age: 68
End: 2018-08-07
Payer: COMMERCIAL

## 2018-08-07 ENCOUNTER — RADIANT APPOINTMENT (OUTPATIENT)
Dept: BONE DENSITY | Facility: CLINIC | Age: 68
End: 2018-08-07
Payer: COMMERCIAL

## 2018-08-07 DIAGNOSIS — M54.2 CHRONIC NECK PAIN: Primary | ICD-10-CM

## 2018-08-07 DIAGNOSIS — G89.29 CHRONIC NECK PAIN: Primary | ICD-10-CM

## 2018-08-07 DIAGNOSIS — Z78.0 ASYMPTOMATIC POSTMENOPAUSAL STATUS: ICD-10-CM

## 2018-08-07 PROCEDURE — 97530 THERAPEUTIC ACTIVITIES: CPT | Mod: GP | Performed by: PHYSICAL THERAPIST

## 2018-08-07 NOTE — PROGRESS NOTES
Pain Physical Therapy Progress Note    Patient Name: Calista Atkisnon     YOB: 1950     Medical Record Number: [id number]    Visits: 2/10    Diagnosis: Chronic neck pain    Subjective: Patient reports neck is feeling much better since last week.  Attributes neck pain to 5 hour drive 2 weekends ago.  Reports no complaints of pain.  Reports sleeping better and easier to read and use computer.    Self Care  HEP: shoulder rolls, seated rotation, T1-2 rot SNAGS  Walking/Aerobic Activity: next  Posture: indep with posture at computer, reading and lying in bed  Breathing/Relaxation: next  Heat/Ice: ice  Mini Breaks: next  Pacing: next      Objective Findings:  GAIT & LOCOMOTION: demonstrates hip flexor tightness and forward upper trunk/head in standing and walking  RANGE OF MOTION: Cervical AROM limited to 25% bilateral rotation  Left shoulder AROM limited endrange flexion and IR  Instructed in T1-2 rotation SNAGS in sitting.  Postural instruction in use of lower thoracic/rib towel support to reduce thoracic kyphosis.  Instructed in seated spinal rotation in global and differentiated head/eye movements.  Recommend interrupting sitting/driving every hour or more frequently as needed.    Treatment Interventions:  Therapeutic Activity:   For 45 minutes as above.  _____________________________________________    Assessment:  Ongoing Functional Limitations Include:  Patient tolerated/responded well to treatment    Intensity Level: 4 (1=low intensity; 5=high intensity)  Demonstrates/Verbalizes Technique: 5 (1= poor technique-difficulty performing exercises,significant cues required; 5= good technique-performs exercises without cues)  Body Awareness: 3 (1=low awareness; 5=high awareness)  Posture/Stability: 3 (1= poor posture, stability; 5= good posture, stability)  Motivational Level: Ask questions, Eager to learn and Cooperative  Response to Teaching: cooperative  Factors that affect learning:  None  _______________________________________________________________________  Plan of Care   Continue PT to support reactivation and integration of self regulation pain management skills;  Focus of next session will be on: add scapular retraining with TB and seated vertical roll, add hip flexor and pectoralis stretch, walking, mini breaks and pacing     Present:  NA     Total Visit Time:  45 minutes    Therapist: Anni Person PT             Date: 8/7/2018  G Code 2/10

## 2018-08-07 NOTE — MR AVS SNAPSHOT
After Visit Summary   8/7/2018    Calista Atkinson    MRN: [id number]           Patient Information     Date Of Birth          1950        Visit Information        Provider Department      8/7/2018 9:00 AM Anni Person PT [facility]        Today's Diagnoses     Chronic neck pain    -  1       Follow-ups after your visit        Your next 10 appointments already scheduled     Aug 27, 2018  8:45 AM CDT   Return Visit with Anni Person PT   [facility] ([facility])    [address] [place] [address]   [phone]            Aug 27, 2018 10:00 AM CDT   Return Visit with REINA Ruvalcaba CNP   [facility] ([facility])    [address] [place] [address]   [phone]            Oct 15, 2018 10:00 AM CDT   SHORT with [facility] MD   [facility]    [address] [place] [address], [address]  [facility] [address] [phone]              Who to contact     If you have questions or need follow up information about today's clinic visit or your schedule please contact [facility] directly at [phone].  Normal or non-critical lab and imaging results will be communicated to you by Needbox AShart, letter or phone within 4 business days after the clinic has received the results. If you do not hear from us within 7 days, please contact the clinic through Needbox AShart or phone. If you have a critical or abnormal lab result, we will notify you by phone as soon as possible.  Submit refill requests through MobPartner or call your pharmacy and they will forward the refill request to us. Please allow 3 business days for your refill to be completed.          Additional Information About Your Visit        [employer] Information     [employer]ob[employer]ner gives you secure access to your electronic health record. If you see a  primary care provider, you can also send messages to your care team and make appointments. If you have questions, please call your primary care clinic.  If you do not have a primary care provider, please call [phone] and they will assist you.        Care EveryWhere ID     This is your Care EveryWhere ID. This could be used by other organizations to access your [place] medical records  [id number]         Blood Pressure from Last 3 Encounters:   07/24/18 110/73   07/10/18 114/80   02/05/18 116/80    Weight from Last 3 Encounters:   07/24/18 124.7 kg (275 lb)   07/10/18 124.9 kg (275 lb 4.8 oz)   02/05/18 127.2 kg (280 lb 6.4 oz)              We Performed the Following     THERAPEUTIC ACTIVITIES          Today's Medication Changes          These changes are accurate as of 8/7/18  9:38 AM.  If you have any questions, ask your nurse or doctor.               These medicines have changed or have updated prescriptions.        Dose/Directions    metoprolol tartrate 25 MG tablet   Commonly known as:  LOPRESSOR   This may have changed:  when to take this   Used for:  Essential hypertension        Dose:  25 mg   Take 1 tablet (25 mg) by mouth 2 times daily   Quantity:  180 tablet   Refills:  1                Primary Care Provider Office Phone # Fax #    Twyla Edna Nick [phone]       [address]   [facility] [address]        Equal Access to Services     GEMA SANCHEZ AH: Hadii ray scruggs Sozac, waaxda luqadaha, qaybta kaalmada shayne, rojas poon . So [facility] [phone].    ATENCIÓN: Si habla español, tiene a alberto disposición servicios gratuitos de asistencia lingüística. Llame al [phone].    We comply with applicable federal civil rights laws and [place] laws. We do not discriminate on the basis of race, color, national origin, age, disability, sex, sexual orientation, or gender identity.            Thank you!     Thank you for choosing  [facility]  for your care. Our goal is always to provide you with excellent care. Hearing back from our patients is one way we can continue to improve our services. Please take a few minutes to complete the written survey that you may receive in the mail after your visit with us. Thank you!             Your Updated Medication List - Protect others around you: Learn how to safely use, store and throw away your medicines at www.disposemymeds.org.          This list is accurate as of 8/7/18  9:38 AM.  Always use your most recent med list.                   Brand Name Dispense Instructions for use Diagnosis    * albuterol (2.5 MG/3ML) 0.083% neb solution     360 mL    Take 1 vial (2.5 mg) by nebulization every 6 hours as needed for shortness of breath / dyspnea or wheezing    Chronic obstructive pulmonary disease, unspecified COPD type (H)       * albuterol 108 (90 Base) MCG/ACT Inhaler    PROAIR HFA/PROVENTIL HFA/VENTOLIN HFA    1 Inhaler    Inhale 2 puffs into the lungs every 6 hours as needed for shortness of breath / dyspnea or wheezing    Chronic obstructive pulmonary disease, unspecified COPD type (H)       aspirin 81 MG tablet      Take 81 mg by mouth daily        atorvastatin 40 MG tablet    LIPITOR    90 tablet    Take 1 tablet (40 mg) by mouth daily    Mixed hyperlipidemia       furosemide 40 MG tablet    LASIX    30 tablet    Take 1 tablet (40 mg) by mouth daily Some days patient takes (2) tabs.    HTN (hypertension), CAD (coronary artery disease)       IBUPROFEN IB OR      Take 1,000 mg by mouth daily as needed        Ipratropium-Albuterol  MCG/ACT inhaler    COMBIVENT RESPIMAT    3 Inhaler    Inhale 1 puff into the lungs every 4 hours as needed for shortness of breath / dyspnea or wheezing Not to exceed 6 doses per day.    Chronic obstructive pulmonary disease, unspecified COPD type (H)       lisinopril-hydrochlorothiazide 20-12.5 MG per tablet    PRINZIDE/ZESTORETIC    90 tablet     TAKE ONE TABLET BY MOUTH ONCE DAILY    HTN (hypertension)       meloxicam 7.5 MG tablet    MOBIC    30 tablet    Take 1 tablet (7.5 mg) by mouth daily    Cervicalgia       metoprolol tartrate 25 MG tablet    LOPRESSOR    180 tablet    Take 1 tablet (25 mg) by mouth 2 times daily    Essential hypertension       omeprazole 40 MG capsule    priLOSEC    90 capsule    TAKE ONE CAPSULE BY MOUTH ONCE DAILY    Gastroesophageal reflux disease without esophagitis       tiotropium 18 MCG capsule    SPIRIVA HANDIHALER    90 capsule    Inhale contents of one capsule daily.    Chronic obstructive pulmonary disease, unspecified COPD type (H)       TYLENOL PO           varenicline 0.5 MG X 11 & 1 MG X 42 tablet    CHANTIX STARTING MONTH ANNA    53 tablet    Take 0.5 mg tab daily for 3 days, then 0.5 mg tab twice daily for 4 days, then 1 mg twice daily.    Personal history of tobacco use, presenting hazards to health       * Notice:  This list has 2 medication(s) that are the same as other medications prescribed for you. Read the directions carefully, and ask your doctor or other care provider to review them with you.

## 2018-10-15 ENCOUNTER — OFFICE VISIT (OUTPATIENT)
Dept: FAMILY MEDICINE | Facility: CLINIC | Age: 68
End: 2018-10-15
Payer: COMMERCIAL

## 2018-10-15 VITALS
BODY MASS INDEX: 45.95 KG/M2 | DIASTOLIC BLOOD PRESSURE: 88 MMHG | HEART RATE: 66 BPM | SYSTOLIC BLOOD PRESSURE: 138 MMHG | OXYGEN SATURATION: 95 % | WEIGHT: 280.4 LBS | TEMPERATURE: 97.9 F | RESPIRATION RATE: 12 BRPM

## 2018-10-15 DIAGNOSIS — I10 ESSENTIAL HYPERTENSION: Primary | ICD-10-CM

## 2018-10-15 DIAGNOSIS — E78.2 MIXED HYPERLIPIDEMIA: ICD-10-CM

## 2018-10-15 DIAGNOSIS — I83.893 VARICOSE VEINS OF BILATERAL LOWER EXTREMITIES WITH OTHER COMPLICATIONS: ICD-10-CM

## 2018-10-15 DIAGNOSIS — J44.9 CHRONIC OBSTRUCTIVE PULMONARY DISEASE, UNSPECIFIED COPD TYPE (H): ICD-10-CM

## 2018-10-15 DIAGNOSIS — F17.210 SMOKES 1/2 PACK/DAY OR LESS: ICD-10-CM

## 2018-10-15 DIAGNOSIS — K21.9 GASTROESOPHAGEAL REFLUX DISEASE WITHOUT ESOPHAGITIS: ICD-10-CM

## 2018-10-15 DIAGNOSIS — I80.01 THROMBOPHLEBITIS OF SUPERFICIAL VEINS OF RIGHT LOWER EXTREMITY: ICD-10-CM

## 2018-10-15 PROCEDURE — 99214 OFFICE O/P EST MOD 30 MIN: CPT | Performed by: FAMILY MEDICINE

## 2018-10-15 RX ORDER — ATORVASTATIN CALCIUM 40 MG/1
40 TABLET, FILM COATED ORAL DAILY
Qty: 90 TABLET | Refills: 3 | Status: SHIPPED | OUTPATIENT
Start: 2018-10-15 | End: 2019-08-27

## 2018-10-15 RX ORDER — FUROSEMIDE 40 MG
40 TABLET ORAL DAILY
Qty: 30 TABLET | Refills: 1 | Status: SHIPPED | OUTPATIENT
Start: 2018-10-15 | End: 2019-04-16

## 2018-10-15 RX ORDER — METOPROLOL TARTRATE 25 MG/1
25 TABLET, FILM COATED ORAL 2 TIMES DAILY
Qty: 180 TABLET | Refills: 1 | Status: SHIPPED | OUTPATIENT
Start: 2018-10-15 | End: 2019-04-16

## 2018-10-15 RX ORDER — OMEPRAZOLE 40 MG/1
40 CAPSULE, DELAYED RELEASE ORAL DAILY
Qty: 90 CAPSULE | Refills: 3 | Status: ON HOLD | OUTPATIENT
Start: 2018-10-15 | End: 2019-10-18

## 2018-10-15 RX ORDER — CEPHALEXIN 500 MG/1
500 CAPSULE ORAL 3 TIMES DAILY
Qty: 30 CAPSULE | Refills: 0 | Status: SHIPPED | OUTPATIENT
Start: 2018-10-15 | End: 2019-04-16

## 2018-10-15 NOTE — PROGRESS NOTES
"  SUBJECTIVE:   Calista Atkinson is a 68 year old female who presents to clinic today for the following health issues:    Hypertension     Diet:  Other  Frequency of exercise:  None  Taking medications regularly:  Yes  Medication side effects:  Other  Additional concerns today:  No  History of Present Illness     Diet:  Other  Frequency of exercise:  None  Taking medications regularly:  Yes  Medication side effects:  Other  Additional concerns today:  No    BP Readings from Last 3 Encounters:   10/15/18 138/88   07/24/18 110/73   07/10/18 114/80     BP elevated today. Currently taking metoprolol 25 mg twice daily. She is not taking lisinopril 20 mg or hydrochlorothiazide 12.5 mg because she ran out 2 days ago. She is picking up her prescription today.     Hyperlipidemia    Recent Labs   Lab Test  07/10/18   1034  02/08/17   0922   06/08/15   0751   CHOL  259*  180   < >  278*   HDL  49*  54   < >  52   LDL  155*  83   < >  172*   TRIG  274*  217*   < >  272*   CHOLHDLRATIO   --    --    --   5.3*    < > = values in this interval not displayed.     Currently on atorvastatin 40 mg daily.     Lower extremity edema  She has not been taking lasix but thinks she should start taking it again because she can feel that her legs have started to swell again. She does have compression stockings at home but does not like using them.     Patient shares that she believes she \"popped\" a vein in her upper right leg on Saturday (10/13). It was very painful, red, and very warm to the touch. She thinks she sat in her chair for too long binge watching a show on [employer]. Getting up and walking around helped. Also laying down on her back helped. She does take a baby aspirin daily.     She did not notice any increase in swelling that day but she has in general noticed an increase in generalized swelling in the past week. Patient does have varicose veins. She does not sit with her legs crossed.     Neck pain  She is no longer taking mobic. She " has been applying lidocaine gel and taking tylenol 1,000 mg twice daily which is working well. she has also been working on her posture which helps. She does not walk or exercise regularly.      COPD  She is not using spiriva. She has not has to use albuterol recently. She has not quit smoking, she is smoking about 5-6 cigarettes a day. Notes that she still has chantix at home but is worried and scared about trying it. She has never tried wellbutrin before. Notes that she can feel that she is getting a cold, but it is not bad.     GERD  Taking omeprazole 40 mg daily. If she does not take omeprazole she will greatly notice symptoms, particularly if she eats food that triggers symptoms. Notes that she drinks about 2-3 cups in the morning and 1 cup in the afternoon.     Problem list and histories reviewed & adjusted, as indicated.  Additional history: as documented    Recent Labs   Lab Test  07/10/18   1034  04/18/17   0758  02/08/17   0922  02/02/16   0934  06/08/15   0751   01/09/15   1204   LDL  155*   --   83  109*  172*   < >  139*   HDL  49*   --   54  57  52   < >   --    TRIG  274*   --   217*  248*  272*   < >   --    ALT  18  14   --    --   17   --   16   CR  0.59  0.66   --    --    --    < >  0.62   GFRESTIMATED  >90  89   --    --    --    < >  >90  Non  GFR Calc     GFRESTBLACK  >90  >90  African American GFR Calc     --    --    --    < >  >90   GFR Calc     POTASSIUM  4.5  4.0   --    --    --    < >  4.3   TSH  2.25   --    --    --    --    --   2.03    < > = values in this interval not displayed.      BP Readings from Last 3 Encounters:   10/15/18 138/88   07/24/18 110/73   07/10/18 114/80    Wt Readings from Last 3 Encounters:   10/15/18 127.2 kg (280 lb 6.4 oz)   07/24/18 124.7 kg (275 lb)   07/10/18 124.9 kg (275 lb 4.8 oz)          Labs reviewed in EPIC    ROS:  Constitutional, HEENT, cardiovascular, pulmonary, gi and gu systems are negative, except as otherwise  noted.    This document serves as a record of the services and decisions personally performed and made by Twyla Nick MD. It was created on her behalf by Donita Elliott, a trained medical scribe. The creation of this document is based on the provider's statements to the medical scribe.  Donita Elliott October 15, 2018 10:37 AM     OBJECTIVE:     /88 (BP Location: Right arm, Patient Position: Chair, Cuff Size: Adult Large)  Pulse 66  Temp 97.9  F (36.6  C) (Oral)  Resp 12  Wt 127.2 kg (280 lb 6.4 oz)  SpO2 95%  BMI 45.95 kg/m2  Body mass index is 45.95 kg/(m^2).     GENERAL: healthy, alert and no distress  RESP: lungs clear to auscultation - no rales, rhonchi or wheezes  CV: regular rate and rhythm, normal S1 S2, no S3 or S4, no murmur, click or rub, no peripheral edema and peripheral pulses strong  MS: no gross musculoskeletal defects noted. Cord like mass in upper right thigh. Warm to touch and swollen. Slight erythema.    SKIN: no suspicious lesions or rashes  NEURO: Normal strength and tone, mentation intact and speech normal  PSYCH: mentation appears normal, affect normal/bright    Diagnostic Test Results:  none     ASSESSMENT/PLAN:   (I10) Essential hypertension  (primary encounter diagnosis)  Comment: BP elevated today. Will continue to monitor. Continue current meds  Plan: furosemide (LASIX) 40 MG tablet, metoprolol         tartrate (LOPRESSOR) 25 MG tablet          (K21.9) Gastroesophageal reflux disease without esophagitis  Comment: Symptoms overall manageable with omeprazole. Suggested cutting back on coffee consumption.   Plan: omeprazole (PRILOSEC) 40 MG capsule          (E78.2) Mixed hyperlipidemia  Comment: Refilled rx, continue atorvastatin  Plan: atorvastatin (LIPITOR) 40 MG tablet          (I80.01) Thrombophlebitis of superficial veins of right lower extremity  Comment: Suspect a superficial thrombophlebitis. US ordered to confirm. Starting keflex, also take 325 mg of aspirin twice  daily with food and applying heat. Recommend getting up and walking at least every hour. Goal is to exercise 5x a week if possible. Follow up if symptoms worsen or do not improve.   Plan: cephALEXin (KEFLEX) 500 MG capsule, US Upper         Extremity Venous Duplex Right    COPD: If noticing a cough, may use albuterol up to 3x daily if needed.     Follow up in 6 months    The information in this document, created by the medical scribe for me, accurately reflects the services I personally performed and the decisions made by me. I have reviewed and approved this document for accuracy prior to leaving the patient care area.  October 15, 2018 10:37 AM    Twyla Nick MD  [facility]

## 2018-10-15 NOTE — NURSING NOTE
"Chief Complaint   Patient presents with     Hypertension     Refill Request     Initial /88 (BP Location: Right arm, Patient Position: Chair, Cuff Size: Adult Large)  Pulse 66  Temp 97.9  F (36.6  C) (Oral)  Resp 12  Wt 280 lb 6.4 oz (127.2 kg)  SpO2 95%  BMI 45.95 kg/m2 Estimated body mass index is 45.95 kg/(m^2) as calculated from the following:    Height as of 2/5/18: 5' 5.5\" (1.664 m).    Weight as of this encounter: 280 lb 6.4 oz (127.2 kg).  BP completed using cuff size large RIGHT arm    Lisa Magill, CMA    "

## 2018-10-15 NOTE — MR AVS SNAPSHOT
After Visit Summary   10/15/2018    Calista Aktinson    MRN: [id number]           Patient Information     Date Of Birth          1950        Visit Information        Provider Department      10/15/2018 10:00 AM Twyla Nick MD [facility]        Today's Diagnoses     Essential hypertension    -  1    Gastroesophageal reflux disease without esophagitis        Mixed hyperlipidemia        Thrombophlebitis of superficial veins of right lower extremity           Follow-ups after your visit        Follow-up notes from your care team     Return in about 6 months (around 4/15/2019) for blood pressure check.      Future tests that were ordered for you today     Open Future Orders        Priority Expected Expires Ordered    US Upper Extremity Venous Duplex Right Routine  10/15/2019 10/15/2018            Who to contact     If you have questions or need follow up information about today's clinic visit or your schedule please contact [facility] directly at [phone].  Normal or non-critical lab and imaging results will be communicated to you by MyChart, letter or phone within 4 business days after the clinic has received the results. If you do not hear from us within 7 days, please contact the clinic through [employer] or phone. If you have a critical or abnormal lab result, we will notify you by phone as soon as possible.  Submit refill requests through [employer] or call your pharmacy and they will forward the refill request to us. Please allow 3 business days for your refill to be completed.          Additional Information About Your Visit        MyChart Information     [employer] gives you secure access to your electronic health record. If you see a primary care provider, you can also send messages to your care team and make appointments. If you have questions, please call your primary care clinic.  If you do not have a primary care provider, please call [phone] and  they will assist you.        Care EveryWhere ID     This is your Care EveryWhere ID. This could be used by other organizations to access your [place] medical records  [id number]        Your Vitals Were     Pulse Temperature Respirations Pulse Oximetry BMI (Body Mass Index)       66 97.9  F (36.6  C) (Oral) 12 95% 45.95 kg/m2        Blood Pressure from Last 3 Encounters:   10/15/18 138/88   07/24/18 110/73   07/10/18 114/80    Weight from Last 3 Encounters:   10/15/18 280 lb 6.4 oz (127.2 kg)   07/24/18 275 lb (124.7 kg)   07/10/18 275 lb 4.8 oz (124.9 kg)                 Today's Medication Changes          These changes are accurate as of 10/15/18 10:58 AM.  If you have any questions, ask your nurse or doctor.               Start taking these medicines.        Dose/Directions    cephALEXin 500 MG capsule   Commonly known as:  KEFLEX   Used for:  Thrombophlebitis of superficial veins of right lower extremity   Started by:  Twyla Nick MD        Dose:  500 mg   Take 1 capsule (500 mg) by mouth 3 times daily   Quantity:  30 capsule   Refills:  0         These medicines have changed or have updated prescriptions.        Dose/Directions    metoprolol tartrate 25 MG tablet   Commonly known as:  LOPRESSOR   This may have changed:  when to take this   Used for:  Essential hypertension        Dose:  25 mg   Take 1 tablet (25 mg) by mouth 2 times daily   Quantity:  180 tablet   Refills:  1       omeprazole 40 MG capsule   Commonly known as:  priLOSEC   This may have changed:  See the new instructions.   Used for:  Gastroesophageal reflux disease without esophagitis   Changed by:  Twyla Nick MD        Dose:  40 mg   Take 1 capsule (40 mg) by mouth daily   Quantity:  90 capsule   Refills:  3         Stop taking these medicines if you haven't already. Please contact your care team if you have questions.     meloxicam 7.5 MG tablet   Commonly known as:  MOBIC   Stopped by:  Twyla Nick MD            tiotropium 18 MCG capsule   Commonly known as:  SPIRIVA HANDIHALER   Stopped by:  Twyla Nick MD                Where to get your medicines      These medications were sent to [facility] [id number] [address] [facility]  [address] [facility] [place] [address]     Phone:  [phone]     atorvastatin 40 MG tablet    cephALEXin 500 MG capsule    furosemide 40 MG tablet    metoprolol tartrate 25 MG tablet    omeprazole 40 MG capsule                Primary Care Provider Office Phone # Fax #    Twyla Nick [phone]   [facility] [address]        Equal Access to Services     [facility] HospitalBERNARDA : Hadii aad ku hadasho Soomaali, waaxda luqadaha, qaybta kaalmada adeegyada, waxay lesleein edy moscoso. So [facility] [phone].    ATENCIÓN: Si habla español, tiene a alberto disposición servicios gratuitos de asistencia lingüística. Llame al [phone].    We comply with applicable federal civil rights laws and [place] laws. We do not discriminate on the basis of race, color, national origin, age, disability, sex, sexual orientation, or gender identity.            Thank you!     Thank you for choosing [facility]  for your care. Our goal is always to provide you with excellent care. Hearing back from our patients is one way we can continue to improve our services. Please take a few minutes to complete the written survey that you may receive in the mail after your visit with us. Thank you!             Your Updated Medication List - Protect others around you: Learn how to safely use, store and throw away your medicines at www.disposemymeds.org.          This list is accurate as of 10/15/18 10:58 AM.  Always use your most recent med list.                   Brand Name Dispense Instructions for use Diagnosis    * albuterol (2.5 MG/3ML) 0.083% neb solution     360 mL    Take 1 vial (2.5 mg) by nebulization every 6 hours as  needed for shortness of breath / dyspnea or wheezing    Chronic obstructive pulmonary disease, unspecified COPD type (H)       * albuterol 108 (90 Base) MCG/ACT inhaler    PROAIR HFA/PROVENTIL HFA/VENTOLIN HFA    1 Inhaler    Inhale 2 puffs into the lungs every 6 hours as needed for shortness of breath / dyspnea or wheezing    Chronic obstructive pulmonary disease, unspecified COPD type (H)       aspirin 81 MG tablet      Take 81 mg by mouth daily        atorvastatin 40 MG tablet    LIPITOR    90 tablet    Take 1 tablet (40 mg) by mouth daily    Mixed hyperlipidemia       cephALEXin 500 MG capsule    KEFLEX    30 capsule    Take 1 capsule (500 mg) by mouth 3 times daily    Thrombophlebitis of superficial veins of right lower extremity       furosemide 40 MG tablet    LASIX    30 tablet    Take 1 tablet (40 mg) by mouth daily Some days patient takes (2) tabs.    Essential hypertension       IBUPROFEN IB OR      Take 1,000 mg by mouth daily as needed        Ipratropium-Albuterol  MCG/ACT inhaler    COMBIVENT RESPIMAT    3 Inhaler    Inhale 1 puff into the lungs every 4 hours as needed for shortness of breath / dyspnea or wheezing Not to exceed 6 doses per day.    Chronic obstructive pulmonary disease, unspecified COPD type (H)       lisinopril-hydrochlorothiazide 20-12.5 MG per tablet    PRINZIDE/ZESTORETIC    90 tablet    TAKE 1 TABLET BY MOUTH ONCE DAILY    HTN (hypertension)       metoprolol tartrate 25 MG tablet    LOPRESSOR    180 tablet    Take 1 tablet (25 mg) by mouth 2 times daily    Essential hypertension       omeprazole 40 MG capsule    priLOSEC    90 capsule    Take 1 capsule (40 mg) by mouth daily    Gastroesophageal reflux disease without esophagitis       TYLENOL PO           varenicline 0.5 MG X 11 & 1 MG X 42 tablet    CHANTIX STARTING MONTH ANNA    53 tablet    Take 0.5 mg tab daily for 3 days, then 0.5 mg tab twice daily for 4 days, then 1 mg twice daily.    Personal history of tobacco use,  presenting hazards to health       * Notice:  This list has 2 medication(s) that are the same as other medications prescribed for you. Read the directions carefully, and ask your doctor or other care provider to review them with you.

## 2018-10-18 ENCOUNTER — HOSPITAL ENCOUNTER (OUTPATIENT)
Dept: ULTRASOUND IMAGING | Facility: CLINIC | Age: 68
Discharge: HOME | End: 2018-10-18
Attending: FAMILY MEDICINE | Admitting: FAMILY MEDICINE
Payer: MEDICARE

## 2018-10-18 DIAGNOSIS — I80.01 THROMBOPHLEBITIS OF SUPERFICIAL VEINS OF RIGHT LOWER EXTREMITY: ICD-10-CM

## 2018-10-18 PROCEDURE — 93971 EXTREMITY STUDY: CPT | Mod: RT

## 2019-04-16 ENCOUNTER — OFFICE VISIT (OUTPATIENT)
Dept: FAMILY MEDICINE | Facility: CLINIC | Age: 69
End: 2019-04-16
Payer: COMMERCIAL

## 2019-04-16 VITALS
DIASTOLIC BLOOD PRESSURE: 82 MMHG | WEIGHT: 277 LBS | OXYGEN SATURATION: 96 % | SYSTOLIC BLOOD PRESSURE: 118 MMHG | RESPIRATION RATE: 16 BRPM | HEART RATE: 70 BPM | HEIGHT: 67 IN | TEMPERATURE: 98.2 F | BODY MASS INDEX: 43.47 KG/M2

## 2019-04-16 DIAGNOSIS — J44.9 CHRONIC OBSTRUCTIVE PULMONARY DISEASE, UNSPECIFIED COPD TYPE (H): ICD-10-CM

## 2019-04-16 DIAGNOSIS — I80.01 THROMBOPHLEBITIS OF SUPERFICIAL VEINS OF RIGHT LOWER EXTREMITY: ICD-10-CM

## 2019-04-16 DIAGNOSIS — I10 ESSENTIAL HYPERTENSION: Primary | ICD-10-CM

## 2019-04-16 DIAGNOSIS — E66.01 MORBID OBESITY (H): ICD-10-CM

## 2019-04-16 PROCEDURE — 99214 OFFICE O/P EST MOD 30 MIN: CPT | Performed by: NURSE PRACTITIONER

## 2019-04-16 RX ORDER — METOPROLOL TARTRATE 25 MG/1
25 TABLET, FILM COATED ORAL 2 TIMES DAILY
Qty: 180 TABLET | Refills: 1 | Status: SHIPPED | OUTPATIENT
Start: 2019-04-16 | End: 2020-04-21

## 2019-04-16 RX ORDER — FUROSEMIDE 40 MG
40 TABLET ORAL DAILY
Qty: 30 TABLET | Refills: 1 | Status: SHIPPED | OUTPATIENT
Start: 2019-04-16 | End: 2019-10-17

## 2019-04-16 RX ORDER — LISINOPRIL AND HYDROCHLOROTHIAZIDE 12.5; 2 MG/1; MG/1
1 TABLET ORAL DAILY
Qty: 90 TABLET | Refills: 1 | Status: ON HOLD | OUTPATIENT
Start: 2019-04-16 | End: 2019-10-18

## 2019-04-16 ASSESSMENT — MIFFLIN-ST. JEOR: SCORE: 1814.09

## 2019-04-16 NOTE — PROGRESS NOTES
SUBJECTIVE:   Calista Atkinson is a 68 year old female who presents to clinic today for the following   health issues:      Hypertension Follow-up      Outpatient blood pressures are not being checked.    Low Salt Diet: no added salt      Amount of exercise or physical activity: None    Problems taking medications regularly: No    Medication side effects: none    Diet: regular (no restrictions)    She also feels like she hasn't been drinking enough water lately.  Doesn't like to wear compression stockings, so she doesn't.  Lasix increases her urination and she doesn't like this, so she doesn't take it every day.  She takes this when she feels her legs starting to swell.      COPD: doesn't use inhalers any more.  Hasn't used albuterol in over a year.  Smoking 4-5 cigarettes/day.  She has chantix, but it worried about taking it.  Doesn't feel like she is ready to quit.  No daily cough.    Last Oct had thrombophlebitis.  Was to see vascular clinic, but never got a call to schedule appt.  Has had a persistent area on her R shin that appears red and sometimes feels warm and tender.      She uses maxi pads daily for incontinence.  She lives in subsidized housing and has a form that will allow her to be credited for her OTC products she purchases.  In addition to maxi pads she purchases baby aspirin and takes 81 mg daily.     Additional history: as documented    Reviewed  and updated as needed this visit by clinical staff         Reviewed and updated as needed this visit by Provider         Current Outpatient Medications   Medication Sig Dispense Refill     Acetaminophen (TYLENOL PO)        albuterol (2.5 MG/3ML) 0.083% neb solution Take 1 vial (2.5 mg) by nebulization every 6 hours as needed for shortness of breath / dyspnea or wheezing 360 mL 0     albuterol (PROAIR HFA/PROVENTIL HFA/VENTOLIN HFA) 108 (90 BASE) MCG/ACT Inhaler Inhale 2 puffs into the lungs every 6 hours as needed for shortness of breath / dyspnea or  "wheezing 1 Inhaler 3     aspirin 81 MG tablet Take 81 mg by mouth daily       atorvastatin (LIPITOR) 40 MG tablet Take 1 tablet (40 mg) by mouth daily 90 tablet 3     furosemide (LASIX) 40 MG tablet Take 1 tablet (40 mg) by mouth daily Some days patient takes (2) tabs. 30 tablet 1     lisinopril-hydrochlorothiazide (PRINZIDE/ZESTORETIC) 20-12.5 MG per tablet TAKE 1 TABLET BY MOUTH ONCE DAILY 90 tablet 1     metoprolol tartrate (LOPRESSOR) 25 MG tablet Take 1 tablet (25 mg) by mouth 2 times daily 180 tablet 1     omeprazole (PRILOSEC) 40 MG capsule Take 1 capsule (40 mg) by mouth daily 90 capsule 3     IBUPROFEN IB OR Take 1,000 mg by mouth daily as needed       Ipratropium-Albuterol (COMBIVENT RESPIMAT)  MCG/ACT inhaler Inhale 1 puff into the lungs every 4 hours as needed for shortness of breath / dyspnea or wheezing Not to exceed 6 doses per day. (Patient not taking: Reported on 10/15/2018) 3 Inhaler 3     varenicline (CHANTIX STARTING MONTH PAK) 0.5 MG X 11 & 1 MG X 42 tablet Take 0.5 mg tab daily for 3 days, then 0.5 mg tab twice daily for 4 days, then 1 mg twice daily. (Patient not taking: Reported on 10/15/2018) 53 tablet 0     No Known Allergies    ROS:  Constitutional, HEENT, cardiovascular, pulmonary, gi and gu systems are negative, except as otherwise noted.    OBJECTIVE:     /82 (BP Location: Right arm, Patient Position: Sitting, Cuff Size: Adult Large)   Pulse 70   Temp 98.2  F (36.8  C) (Oral)   Resp 16   Ht 1.702 m (5' 7\")   Wt 125.6 kg (277 lb)   SpO2 96%   BMI 43.38 kg/m    Body mass index is 43.38 kg/m .  GENERAL: healthy, alert and no distress  NECK: no adenopathy, no asymmetry, masses, or scars and thyroid normal to palpation  RESP: lungs clear to auscultation - no rales, rhonchi or wheezes  CV: regular rate and rhythm, normal S1 S2, no S3 or S4, no murmur, click or rub, no trace bilat LE edema, non-pitting, L>R, and peripheral pulses strong  MS: no gross musculoskeletal defects " noted, varicose vein on the R mid shin with slight erythema, mild tenderness, no warmth  SKIN: no suspicious lesions or rashes, warm and dry  PSYCH: mentation appears normal, affect normal/bright    Diagnostic Test Results:  none     ASSESSMENT/PLAN:   1. Essential hypertension  Well controlled.  Continue current medications.   - metoprolol tartrate (LOPRESSOR) 25 MG tablet; Take 1 tablet (25 mg) by mouth 2 times daily  Dispense: 180 tablet; Refill: 1  - lisinopril-hydrochlorothiazide (PRINZIDE/ZESTORETIC) 20-12.5 MG tablet; Take 1 tablet by mouth daily  Dispense: 90 tablet; Refill: 1  - furosemide (LASIX) 40 MG tablet; Take 1 tablet (40 mg) by mouth daily Some days patient takes (2) tabs.  Dispense: 30 tablet; Refill: 1  - Basic metabolic panel    2. Morbid obesity (H)  Encouraged daily walks outside.      3. Chronic obstructive pulmonary disease, unspecified COPD type (H)  No longer using inhalers.  Minimal symptoms.  Continues to smoke and is not ready to quit.     4. Thrombophlebitis of superficial veins of right lower extremity  Try heat/cold, elevation, avoid prolonged sitting.  Was to see vascular clinic, but hasn't done this.  Given phone number to call and schedule.     F/u 3 mos for wellness exam    REINA Flores [facility]

## 2019-04-18 DIAGNOSIS — I10 HTN (HYPERTENSION): ICD-10-CM

## 2019-04-18 DIAGNOSIS — I10 ESSENTIAL HYPERTENSION: ICD-10-CM

## 2019-04-18 PROCEDURE — 36415 COLL VENOUS BLD VENIPUNCTURE: CPT | Performed by: FAMILY MEDICINE

## 2019-04-18 PROCEDURE — 80053 COMPREHEN METABOLIC PANEL: CPT | Performed by: FAMILY MEDICINE

## 2019-04-19 LAB
ALBUMIN SERPL-MCNC: 3.5 G/DL (ref 3.4–5)
ALP SERPL-CCNC: 80 U/L (ref 40–150)
ALT SERPL W P-5'-P-CCNC: 13 U/L (ref 0–50)
ANION GAP SERPL CALCULATED.3IONS-SCNC: 7 MMOL/L (ref 3–14)
AST SERPL W P-5'-P-CCNC: 11 U/L (ref 0–45)
BILIRUB SERPL-MCNC: 0.7 MG/DL (ref 0.2–1.3)
BUN SERPL-MCNC: 13 MG/DL (ref 7–30)
CALCIUM SERPL-MCNC: 9.1 MG/DL (ref 8.5–10.1)
CHLORIDE SERPL-SCNC: 105 MMOL/L (ref 94–109)
CO2 SERPL-SCNC: 27 MMOL/L (ref 20–32)
CREAT SERPL-MCNC: 0.65 MG/DL (ref 0.52–1.04)
GFR SERPL CREATININE-BSD FRML MDRD: 90 ML/MIN/{1.73_M2}
GLUCOSE SERPL-MCNC: 89 MG/DL (ref 70–99)
POTASSIUM SERPL-SCNC: 4 MMOL/L (ref 3.4–5.3)
PROT SERPL-MCNC: 7 G/DL (ref 6.8–8.8)
SODIUM SERPL-SCNC: 139 MMOL/L (ref 133–144)

## 2019-05-07 ENCOUNTER — HOSPITAL ENCOUNTER (OUTPATIENT)
Dept: LAB | Facility: CLINIC | Age: 69
End: 2019-05-07
Attending: INTERNAL MEDICINE
Payer: COMMERCIAL

## 2019-05-07 ENCOUNTER — OFFICE VISIT (OUTPATIENT)
Dept: SURGERY | Facility: CLINIC | Age: 69
End: 2019-05-07
Payer: COMMERCIAL

## 2019-05-07 VITALS
OXYGEN SATURATION: 95 % | RESPIRATION RATE: 16 BRPM | HEIGHT: 67 IN | HEART RATE: 70 BPM | WEIGHT: 277 LBS | BODY MASS INDEX: 43.47 KG/M2 | SYSTOLIC BLOOD PRESSURE: 120 MMHG | DIASTOLIC BLOOD PRESSURE: 74 MMHG

## 2019-05-07 DIAGNOSIS — I65.22 RECURRENT CAROTID STENOSIS, LEFT: Primary | ICD-10-CM

## 2019-05-07 DIAGNOSIS — Z86.72 PERSONAL HISTORY OF THROMBOPHLEBITIS: ICD-10-CM

## 2019-05-07 LAB — D DIMER PPP FEU-MCNC: 2.1 UG/ML FEU (ref 0–0.5)

## 2019-05-07 PROCEDURE — 99204 OFFICE O/P NEW MOD 45 MIN: CPT | Performed by: INTERNAL MEDICINE

## 2019-05-07 PROCEDURE — 36415 COLL VENOUS BLD VENIPUNCTURE: CPT | Performed by: INTERNAL MEDICINE

## 2019-05-07 PROCEDURE — 85379 FIBRIN DEGRADATION QUANT: CPT | Performed by: INTERNAL MEDICINE

## 2019-05-07 ASSESSMENT — MIFFLIN-ST. JEOR: SCORE: 1814.09

## 2019-05-07 NOTE — PROGRESS NOTES
Vascular Medicine Consultation     Chief Complaint   Right lower extremity superficial thrombophlebitis          Reason for Consult   Reason for consult: I was asked by PCP to evaluate this patient for right lower extremity superficial thrombophlebitis.    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, patient has a strong family history of varicose veins/venous insufficiency now she is coming in complaining of superficial thrombophlebitis diagnosed clinically and by Doppler venous ultrasound, it does not involve the deep veins yet its a long segment of a varicosity and it is about 3 inches long very tender and this is the third episode of superficial thrombophlebitis in the same leg which has very prominent varicose veins  Patient has classic signs and symptoms of venous insufficiency in both legs in addition to the family history and a high BMI  Patient denies any history of hormone treatment, or any indication that can enhance thrombosis, patient is up-to-date on her screening mammogram, and colonoscopy  Patient denies any history of constitutional symptoms, no history of abdominal pain back pain or joint pain  Patient has no history of DVT in the past or any blood clots    Past Medical History   I have reviewed this patient's medical history and updated it with pertinent information if needed.   Past Medical History:   Diagnosis Date     CAD (coronary artery disease) 6/2012    STEMI and stent      Carotid artery plaque 1/9/2015     Cellulitis      COPD (chronic obstructive pulmonary disease) (H)      CVA (cerebral infarction) 2012     Gastro-oesophageal reflux disease      Hyperlipidemia      Hypertension      Myocardial infarction (H) 2012     S/P carotid  endarterectomy 1/9/2015       Past Surgical History   I have reviewed this patient's surgical history and updated it with pertinent information if needed.  Past Surgical History:   Procedure Laterality Date     ANGIOGRAM  6/6/12    thrombectomy and stent RCA     ENDARTERECTOMY CAROTID Left 3/27/12     HEART CATH LEFT HEART CATH  03/02/15    Evidence of old MI, Continued good results of the stent in mid RCA with mild disease elsewhere in the RCA, Disease in very small branch of the distal CFX.      TONSILLECTOMY      age 9       Prior to Admission Medications   Cannot display prior to admission medications because the patient has not been admitted in this contact.     Allergies   No Known Allergies    Social History   I have reviewed this patient's social history and updated it with pertinent information if needed. Calista Atkinson  reports that she has been smoking cigarettes.  She has been smoking about 0.50 packs per day. She has never used smokeless tobacco. She reports that she does not drink alcohol or use drugs.    Family History   I have reviewed this patient's family history and updated it with pertinent information if needed.   Family History   Problem Relation Age of Onset     Myocardial Infarction Mother      Hypertension Mother      Prostate Cancer Father      Cancer Father         Lymphnode     Cancer - colorectal Paternal Grandfather      Colon Cancer Paternal Grandfather      Alcohol/Drug Son         Alcoholic     Hypertension Daughter      Hyperlipidemia Daughter      Myocardial Infarction Brother      Respiratory Brother         Pneumonia        Review of Systems   The 10 point Review of Systems is negative other than noted in the HPI or here.     Physical Exam       BP: 120/74 Pulse: 70   Resp: 16 SpO2: 95 %      Vital Signs with Ranges  Pulse:  [70] 70  Resp:  [16] 16  BP: (118-120)/(74-76) 120/74  SpO2:  [95 %] 95 %  277 lbs 0 oz    Constitutional: awake, alert, cooperative, no apparent distress,  and appears stated age  Eyes: Lids and lashes normal, pupils equal, round and reactive to light, extra ocular muscles intact, sclera clear, conjunctiva normal  ENT: normocepalic, without obvious abnormality, oropharynx pink and moist  Hematologic / Lymphatic: no lymphadenopathy  Respiratory: No increased work of breathing, good air exchange, clear to auscultation bilaterally, no crackles or wheezing  Cardiovascular: regular rate and rhythm, normal S1 and S2 and no murmur noted  GI: Normal bowel sounds, soft, non-distended, non-tender  Skin: no redness, warmth, or swelling, no rashes and no lesions  Musculoskeletal: There is no redness, warmth, or swelling of the joints.  Full range of motion noted.  Motor strength is 5 out of 5 all extremities bilaterally.  Tone is normal.  Neurologic: Awake, alert, oriented to name, place and time.  Cranial nerves II-XII are grossly intact.  Motor is 5 out of 5 bilaterally.    Neuropsychiatric:  Normal affect, memory, insight.  Pulses: Positive pulses bilateral equal  Superficial thrombophlebitis, cordlike structure measuring 3 cm in the lower one third of the medial aspect of the right leg, tender to touch, with skin color changes,  Varicose veins, spider veins and telangiectasia, C3  .  Bilateral carotid bruits appreciated.     Data   Most Recent 3 CBC's:  Recent Labs   Lab Test 07/10/18  1034 04/18/17  0758 02/25/15  1410 01/09/15  1204   WBC 7.9 7.1  --  8.9   HGB 15.8* 15.2 15.4 15.5   MCV 91 88  --  90    180 168 183     Most Recent 3 BMP's:  Recent Labs   Lab Test 04/18/19  0900 07/10/18  1034 04/18/17  0758    137 140   POTASSIUM 4.0 4.5 4.0   CHLORIDE 105 103 103   CO2 27 25 29   BUN 13 14 11   CR 0.65 0.59 0.66   ANIONGAP 7 9 8   KEVON 9.1 9.1 9.1   GLC 89 92 89     Most Recent 2 LFT's:  Recent Labs   Lab Test 04/18/19  0900 07/10/18  1034   AST 11 14   ALT 13 18   ALKPHOS 80 92   BILITOTAL 0.7 0.4     Most Recent 3 INR's:  Recent Labs   Lab Test 02/25/15  1410    INR 0.94     Most Recent 3 Creatinines:  Recent Labs   Lab Test 04/18/19  0900 07/10/18  1034 04/18/17  0758   CR 0.65 0.59 0.66     Most Recent 3 Hemoglobins:  Recent Labs   Lab Test 07/10/18  1034 04/18/17  0758 02/25/15  1410   HGB 15.8* 15.2 15.4     Most Recent 3 BNP's:  Recent Labs   Lab Test 12/23/14  0800   NTBNPI 130     Most Recent D-dimer:No lab results found.  Most Recent Cholesterol Panel:  Recent Labs   Lab Test 07/10/18  1034   CHOL 259*   *   HDL 49*   TRIG 274*     Most Recent Hemoglobin A1c:No lab results found.  Most Recent 6 glucoses:  Recent Labs   Lab Test 04/18/19  0900 07/10/18  1034 04/18/17  0758 02/25/15  1410 01/09/15  1204 12/23/14  0800   GLC 89 92 89 89 81 105*     Most Recent Urinalysis:No lab results found.      Assessment & Plan   (I65.22) Recurrent carotid stenosis, left  (primary encounter diagnosis)  Plan: US Carotid Bilateral            (Z86.72) Personal history of thrombophlebitis  Comment: Evidence of superficial thrombophlebitis right lower extremity, tender to touch, 3 inches long,, still symptomatic,  Plan: D dimer quantitative, rivaroxaban ANTICOAGULANT        (XARELTO) 15 MG TABS tablet              Summary: Follow-up in 3 to 4 weeks from now    Chan Staples MD

## 2019-05-08 ENCOUNTER — TELEPHONE (OUTPATIENT)
Dept: OTHER | Facility: CLINIC | Age: 69
End: 2019-05-08

## 2019-05-08 NOTE — TELEPHONE ENCOUNTER
Per Dr. Bel Grigsby provided with 6 sample  bottles of Xarelto 15 mg tablet. Calista provided patient education on Xarelto.    Lot # [id number] 1/20    Joellen MCCORDN, RN

## 2019-05-10 ENCOUNTER — TELEPHONE (OUTPATIENT)
Dept: OTHER | Facility: CLINIC | Age: 69
End: 2019-05-10

## 2019-05-10 NOTE — TELEPHONE ENCOUNTER
LM on preferred cell phone asking patient to call to schedule.  Patient needs carotid ultrasound next available and to see Dr. Staples in one month. Treasure Powers,

## 2019-05-17 ENCOUNTER — HOSPITAL ENCOUNTER (OUTPATIENT)
Dept: ULTRASOUND IMAGING | Facility: CLINIC | Age: 69
Discharge: HOME | End: 2019-05-17
Attending: INTERNAL MEDICINE | Admitting: INTERNAL MEDICINE
Payer: COMMERCIAL

## 2019-05-17 DIAGNOSIS — I65.22 RECURRENT CAROTID STENOSIS, LEFT: ICD-10-CM

## 2019-05-17 PROCEDURE — 93880 EXTRACRANIAL BILAT STUDY: CPT

## 2019-06-04 ENCOUNTER — OFFICE VISIT (OUTPATIENT)
Dept: SURGERY | Facility: CLINIC | Age: 69
End: 2019-06-04
Attending: INTERNAL MEDICINE
Payer: COMMERCIAL

## 2019-06-04 VITALS
BODY MASS INDEX: 43.47 KG/M2 | HEIGHT: 67 IN | WEIGHT: 277 LBS | RESPIRATION RATE: 16 BRPM | HEART RATE: 83 BPM | DIASTOLIC BLOOD PRESSURE: 72 MMHG | SYSTOLIC BLOOD PRESSURE: 110 MMHG | OXYGEN SATURATION: 95 %

## 2019-06-04 DIAGNOSIS — I87.2 VENOUS (PERIPHERAL) INSUFFICIENCY: Primary | ICD-10-CM

## 2019-06-04 DIAGNOSIS — I65.22 RECURRENT CAROTID STENOSIS, LEFT: ICD-10-CM

## 2019-06-04 DIAGNOSIS — Z86.72 PERSONAL HISTORY OF THROMBOPHLEBITIS: ICD-10-CM

## 2019-06-04 PROCEDURE — 99214 OFFICE O/P EST MOD 30 MIN: CPT | Performed by: INTERNAL MEDICINE

## 2019-06-04 ASSESSMENT — MIFFLIN-ST. JEOR: SCORE: 1809.09

## 2019-06-04 NOTE — PROGRESS NOTES
Vascular Medicine Progress Note     Calista Atkinson is a 69 year old female who is here for follow-up on superficial thrombophlebitis, patient was on Xarelto,    Interval History   Patient superficial thrombophlebitis completely resolved, patient is not wearing compression stockings, patient has all signs and symptoms of venous insufficiency, patient is up-to-date on her screening she is up-to-date on mammogram and colonoscopy    Patient carotid Doppler arterial ultrasound showed no evidence of critical stenoses no increased velocities and normal ratios        Physical Exam       BP: 110/72 Pulse: 83   Resp: 16 SpO2: 95 %      Vitals:    06/04/19 1045   Weight: 125.6 kg (277 lb)     Vital Signs with Ranges  Pulse:  [83] 83  Resp:  [16] 16  BP: (110)/(72) 110/72  SpO2:  [95 %] 95 %  [unfilled]    Constitutional: awake, alert, cooperative, no apparent distress, and appears stated age  Eyes: Lids and lashes normal, pupils equal, round and reactive to light, extra ocular muscles intact, sclera clear, conjunctiva normal  ENT: normocepalic, without obvious abnormality, oropharynx pink and moist  Hematologic / Lymphatic: no lymphadenopathy  Respiratory: No increased work of breathing, good air exchange, clear to auscultation bilaterally, no crackles or wheezing  Cardiovascular: regular rate and rhythm, normal S1 and S2 and no murmur noted  GI: Normal bowel sounds, soft, non-distended, non-tender  Skin: no redness, warmth, or swelling, no rashes and no lesions  Musculoskeletal: There is no redness, warmth, or swelling of the joints.  Full range of motion noted.  Motor strength is 5 out of 5 all extremities bilaterally.  Tone is normal.  Neurologic: Awake, alert, oriented to name, place and time.  Cranial nerves II-XII are grossly intact.  Motor is 5 out of 5 bilaterally.    Neuropsychiatric:  Normal affect, memory, insight.  Pulses: Intact pulses  . No carotid bruits appreciated.     Medications         Data   No  results found for this or any previous visit (from the past 24 hour(s)).    Assessment & Plan   (I87.2) Venous (peripheral) insufficiency  (primary encounter diagnosis)  Comment: Signs and symptoms of venous hypertension/insufficiency  Plan: US Venous Competency Bilateral        Patient will probably need compression treatment    (I65.22) Recurrent carotid stenosis, left  Comment: No evidence of carotid stenoses  Plan: Vascular risk factors modification, target blood pressure 130/80, target LDL less than 100    (Z86.72) Personal history of thrombophlebitis  Comment: Patient is up-to-date on her screening    Plan: Most probably secondary to venous hypertension/venous insufficiency        Summary: Follow-up with the patient after obtaining results of venous insufficiency study    Chan Staples MD

## 2019-07-02 ENCOUNTER — HOSPITAL ENCOUNTER (OUTPATIENT)
Dept: ULTRASOUND IMAGING | Facility: CLINIC | Age: 69
Discharge: HOME | End: 2019-07-02
Attending: INTERNAL MEDICINE | Admitting: INTERNAL MEDICINE
Payer: COMMERCIAL

## 2019-07-02 ENCOUNTER — OFFICE VISIT (OUTPATIENT)
Dept: SURGERY | Facility: CLINIC | Age: 69
End: 2019-07-02
Attending: INTERNAL MEDICINE
Payer: COMMERCIAL

## 2019-07-02 VITALS
HEIGHT: 67 IN | DIASTOLIC BLOOD PRESSURE: 70 MMHG | RESPIRATION RATE: 16 BRPM | BODY MASS INDEX: 43.47 KG/M2 | SYSTOLIC BLOOD PRESSURE: 114 MMHG | HEART RATE: 82 BPM | OXYGEN SATURATION: 97 % | WEIGHT: 277 LBS

## 2019-07-02 DIAGNOSIS — Z86.72 PERSONAL HISTORY OF THROMBOPHLEBITIS: ICD-10-CM

## 2019-07-02 DIAGNOSIS — I87.2 VENOUS (PERIPHERAL) INSUFFICIENCY: ICD-10-CM

## 2019-07-02 DIAGNOSIS — I65.22 RECURRENT CAROTID STENOSIS, LEFT: ICD-10-CM

## 2019-07-02 PROCEDURE — 93970 EXTREMITY STUDY: CPT

## 2019-07-02 PROCEDURE — 99214 OFFICE O/P EST MOD 30 MIN: CPT | Performed by: INTERNAL MEDICINE

## 2019-07-02 ASSESSMENT — MIFFLIN-ST. JEOR: SCORE: 1809.09

## 2019-07-02 NOTE — PROGRESS NOTES
Vascular Medicine Progress Note     Calista Atkinson is a 69 year old female who is here for follow-up on Doppler venous incompetency testing    Interval History   Patient has the classic signs and symptoms of venous incompetence, it was confirmed by the Doppler venous study which showed no DVT bilaterally, incompetent E IV bilaterally in addition to incompetent CF V bilaterally, patient has superficial system reflux including the right more than the left GSV  Venous ultrasound showing evidence of bilateral Baker cysts none complicated    Physical Exam       BP: 114/70 Pulse: 82   Resp: 16 SpO2: 97 %      Vitals:    07/02/19 1434   Weight: 125.6 kg (277 lb)     Vital Signs with Ranges  Pulse:  [82] 82  Resp:  [16] 16  BP: (114)/(70) 114/70  SpO2:  [97 %] 97 %  [unfilled]    Constitutional: awake, alert, cooperative, no apparent distress, and appears stated age  Eyes: Lids and lashes normal, pupils equal, round and reactive to light, extra ocular muscles intact, sclera clear, conjunctiva normal  ENT: normocepalic, without obvious abnormality, oropharynx pink and moist  Hematologic / Lymphatic: no lymphadenopathy  Respiratory: No increased work of breathing, good air exchange, clear to auscultation bilaterally, no crackles or wheezing  Cardiovascular: regular rate and rhythm, normal S1 and S2 and no murmur noted  GI: Normal bowel sounds, soft, non-distended, non-tender  Skin: no redness, warmth, or swelling, no rashes and no lesions  Musculoskeletal: There is no redness, warmth, or swelling of the joints.  Full range of motion noted.  Motor strength is 5 out of 5 all extremities bilaterally.  Tone is normal.  Neurologic: Awake, alert, oriented to name, place and time.  Cranial nerves II-XII are grossly intact.  Motor is 5 out of 5 bilaterally.    Neuropsychiatric:  Normal affect, memory, insight.  Pulses: Intact pulses  . No carotid bruits appreciated.     Medications         Data   No results found for this or  any previous visit (from the past 24 hour(s)).    Assessment & Plan     (Z86.72) Personal history of thrombophlebitis  Comment: Resolved  Plan: Continue with compression treatment    (I87.2) Venous (peripheral) insufficiency  Comment: Confirmed by venous study  Plan: Compression treatment, 20 to 30 mmHg, thigh-high, close toe bilateral  Care instructions were given to the patient        Summary: Follow-up with the patient 3 Mns    Chan Staples MD

## 2019-08-01 DIAGNOSIS — I65.23 ATHEROSCLEROSIS OF BOTH CAROTID ARTERIES: Primary | ICD-10-CM

## 2019-08-21 ENCOUNTER — TELEPHONE (OUTPATIENT)
Dept: FAMILY MEDICINE | Facility: CLINIC | Age: 69
End: 2019-08-21

## 2019-08-27 ENCOUNTER — OFFICE VISIT (OUTPATIENT)
Dept: FAMILY MEDICINE | Facility: CLINIC | Age: 69
End: 2019-08-27
Payer: COMMERCIAL

## 2019-08-27 VITALS
HEART RATE: 72 BPM | DIASTOLIC BLOOD PRESSURE: 80 MMHG | OXYGEN SATURATION: 95 % | TEMPERATURE: 97.6 F | HEIGHT: 67 IN | SYSTOLIC BLOOD PRESSURE: 110 MMHG | RESPIRATION RATE: 20 BRPM | WEIGHT: 265 LBS | BODY MASS INDEX: 41.59 KG/M2

## 2019-08-27 DIAGNOSIS — I87.2 VENOUS (PERIPHERAL) INSUFFICIENCY: ICD-10-CM

## 2019-08-27 DIAGNOSIS — Z87.891 PERSONAL HISTORY OF TOBACCO USE: ICD-10-CM

## 2019-08-27 DIAGNOSIS — J44.9 CHRONIC OBSTRUCTIVE PULMONARY DISEASE, UNSPECIFIED COPD TYPE (H): ICD-10-CM

## 2019-08-27 DIAGNOSIS — F17.210 SMOKES 1/2 PACK/DAY OR LESS: ICD-10-CM

## 2019-08-27 DIAGNOSIS — Z00.00 ENCOUNTER FOR MEDICARE ANNUAL WELLNESS EXAM: Primary | ICD-10-CM

## 2019-08-27 DIAGNOSIS — E78.2 MIXED HYPERLIPIDEMIA: ICD-10-CM

## 2019-08-27 LAB
ERYTHROCYTE [DISTWIDTH] IN BLOOD BY AUTOMATED COUNT: 12.8 % (ref 10–15)
HCT VFR BLD AUTO: 44.9 % (ref 35–47)
HGB BLD-MCNC: 14.8 G/DL (ref 11.7–15.7)
MCH RBC QN AUTO: 30.5 PG (ref 26.5–33)
MCHC RBC AUTO-ENTMCNC: 33 G/DL (ref 31.5–36.5)
MCV RBC AUTO: 93 FL (ref 78–100)
PLATELET # BLD AUTO: 184 10E9/L (ref 150–450)
RBC # BLD AUTO: 4.85 10E12/L (ref 3.8–5.2)
WBC # BLD AUTO: 7.8 10E9/L (ref 4–11)

## 2019-08-27 PROCEDURE — 80053 COMPREHEN METABOLIC PANEL: CPT | Performed by: FAMILY MEDICINE

## 2019-08-27 PROCEDURE — 36415 COLL VENOUS BLD VENIPUNCTURE: CPT | Performed by: FAMILY MEDICINE

## 2019-08-27 PROCEDURE — 84443 ASSAY THYROID STIM HORMONE: CPT | Performed by: FAMILY MEDICINE

## 2019-08-27 PROCEDURE — 99397 PER PM REEVAL EST PAT 65+ YR: CPT | Performed by: FAMILY MEDICINE

## 2019-08-27 PROCEDURE — 80061 LIPID PANEL: CPT | Performed by: FAMILY MEDICINE

## 2019-08-27 PROCEDURE — 85027 COMPLETE CBC AUTOMATED: CPT | Performed by: FAMILY MEDICINE

## 2019-08-27 PROCEDURE — G0296 VISIT TO DETERM LDCT ELIG: HCPCS | Performed by: FAMILY MEDICINE

## 2019-08-27 RX ORDER — ROSUVASTATIN CALCIUM 40 MG/1
40 TABLET, COATED ORAL DAILY
Qty: 90 TABLET | Refills: 3 | Status: SHIPPED | OUTPATIENT
Start: 2019-08-27 | End: 2020-01-03

## 2019-08-27 ASSESSMENT — ENCOUNTER SYMPTOMS
ABDOMINAL PAIN: 0
HEMATOCHEZIA: 0
DIARRHEA: 0
CHILLS: 0
COUGH: 1
NERVOUS/ANXIOUS: 0
DIZZINESS: 0
CONSTIPATION: 0
EYE PAIN: 0
HEMATURIA: 0

## 2019-08-27 ASSESSMENT — PATIENT HEALTH QUESTIONNAIRE - PHQ9
SUM OF ALL RESPONSES TO PHQ QUESTIONS 1-9: 8
10. IF YOU CHECKED OFF ANY PROBLEMS, HOW DIFFICULT HAVE THESE PROBLEMS MADE IT FOR YOU TO DO YOUR WORK, TAKE CARE OF THINGS AT HOME, OR GET ALONG WITH OTHER PEOPLE: NOT DIFFICULT AT ALL
SUM OF ALL RESPONSES TO PHQ QUESTIONS 1-9: 8

## 2019-08-27 ASSESSMENT — MIFFLIN-ST. JEOR: SCORE: 1754.66

## 2019-08-27 ASSESSMENT — ACTIVITIES OF DAILY LIVING (ADL): CURRENT_FUNCTION: NO ASSISTANCE NEEDED

## 2019-08-27 NOTE — PROGRESS NOTES
"    She is at risk for lack of exercise and has been provided with information to increase physical activity for the benefit of her well-being.  The patient was counseled and encouraged to consider modifying their diet and eating habits. She was provided with information on recommended healthy diet options.  Information on urinary incontinence and treatment options given to patient.  Answers for HPI/ROS submitted by the patient on 8/27/2019   Annual Exam:  In general, how would you rate your overall physical health?: good  Frequency of exercise:: None  Do you usually eat at least 4 servings of fruit and vegetables a day, include whole grains & fiber, and avoid regularly eating high fat or \"junk\" foods? : No  Taking medications regularly:: Yes  Medication side effects:: None  Activities of Daily Living: no assistance needed  Home safety: no safety concerns identified  Hearing Impairment:: no hearing concerns  In the past 6 months, have you been bothered by leaking of urine?: Yes  abdominal pain: No  Blood in stool: No  Blood in urine: No  chest pain: No  chills: No  congestion: Yes  constipation: No  cough: Yes  diarrhea: No  dizziness: No  ear pain: No  eye pain: No  nervous/anxious: No  In general, how would you rate your overall mental or emotional health?: good  Additional concerns today:: No  If you checked off any problems, how difficult have these problems made it for you to do your work, take care of things at home, or get along with other people?: Not difficult at all  PHQ9 TOTAL SCORE: 8    Lung Cancer Screening Shared Decision Making Visit     Calista Atkinson is eligible for lung cancer screening on the basis of the information provided in my signed lung cancer screening order.     I have discussed with patient the risks and benefits of screening for lung cancer with low-dose CT.     The risks include:  radiation exposure: one low dose chest CT has as much ionizing radiation as about 15 chest x-rays or 6 " months of background radiation living in [place]    false positives: 96% of positive findings/nodules are NOT cancer, but some might still require additional diagnostic evaluation, including biopsy  over-diagnosis: some slow growing cancers that might never have been clinically significant will be detected and treated unnecessarily     The benefit of early detection of lung cancer is contingent upon adherence to annual screening or more frequent follow up if indicated.     Furthermore, reaping the benefits of screening requires Calista Atkinson to be willing and physically able to undergo diagnostic procedures, if indicated. Although no specific guide is available for determining severity of comorbidities, it is reasonable to withhold screening in patients who have greater mortality risk from other diseases.     We did discuss that the only way to prevent lung cancer is to not smoke. Smoking cessation assistance was offered.pt has chantix rx at home    I did not offer risk estimation using a calculator such as this one:    ShouldIScreen

## 2019-08-27 NOTE — PROGRESS NOTES
"SUBJECTIVE:   Calista Atkinson is a 69 year old female who presents for Preventive Visit.    Are you in the first 12 months of your Medicare coverage?  No    Healthy Habits:     In general, how would you rate your overall health?  Good    Frequency of exercise:  None    Do you usually eat at least 4 servings of fruit and vegetables a day, include whole grains    & fiber and avoid regularly eating high fat or \"junk\" foods?  No    Taking medications regularly:  Yes    Medication side effects:  None    Ability to successfully perform activities of daily living:  No assistance needed    Home Safety:  No safety concerns identified    Hearing Impairment:  No hearing concerns    In the past 6 months, have you been bothered by leaking of urine? Yes    In general, how would you rate your overall mental or emotional health?  Good      PHQ-2 Total Score: 3    Additional concerns today:  No    Do you feel safe in your environment? Yes    Do you have a Health Care Directive? Yes: Patient states has Advance Directive and will bring in a copy to clinic.    Fall risk  Fallen 2 or more times in the past year?: No  Any fall with injury in the past year?: No    Cognitive Screening   1) Repeat 3 items (Leader, Season, Table)    2) Clock draw: NORMAL  3) 3 item recall: Recalls 3 objects  Results: 3 items recalled: COGNITIVE IMPAIRMENT LESS LIKELY    Mini-CogTM Copyright S Young. Licensed by the author for use in [facility]; reprinted with permission (kevin@.[facility]). All rights reserved.      Do you have sleep apnea, excessive snoring or daytime drowsiness?: no     Answers for HPI/ROS submitted by the patient on 8/27/2019   Annual Exam:  If you checked off any problems, how difficult have these problems made it for you to do your work, take care of things at home, or get along with other people?: Not difficult at all  PHQ9 TOTAL SCORE: 8    Urine incontinence  She wears pads for urine leakage. She is not interested in " medications at the moment.     Venous insufficiency   Patient is wearing OTC knee high compression stockings, but has not bought the compression stockings recommended by vascular medicine. Notes that her insurance does not cover them and she was unsure which size she should get on [employer]. She wears them only during the day and it has improved her leg swelling. She admits to not getting up and moving around every hour. She has not had to use Lasix recently.     COPD  She is always SOB and has a cough. She rarely uses Combivent only when she gets SOB with exertion. However, she thinks she currently has a cold. Denies a sore throat but notes rhinorrhea. She usually takes sudafed which works well for her.     Tobacco use  She is a current smoker, smokes 3-4 cigarettes a day. She has a 30+ yr smoking hx and at one point was smoking 1.5 packs a day. She has not tried Chantix due to fears of potential side effects.    Depression    PHQ-9 SCORE 8/27/2019   PHQ-9 Total Score MyChart 8 (Mild depression)   PHQ-9 Total Score 8     She admits to having depressive moods, triggered mostly by boredom. She is also unhappy with the residents who live at her residence, so she tends to stay in her apartment all day. She tries to stay busy with doing puzzles, etc. She is not interested in starting an antidepressant or seeing a counselor.     Hyperlipidemia    Recent Labs   Lab Test 07/10/18  1034 02/08/17  0922  06/08/15  0751   CHOL 259* 180   < > 278*   HDL 49* 54   < > 52   * 83   < > 172*   TRIG 274* 217*   < > 272*   CHOLHDLRATIO  --   --   --  5.3*    < > = values in this interval not displayed.     Currently on atorvastatin 40 mg daily. Notes that Dr. Staples recommended increasing her statin dose to reduce risk of stroke. Patient admits to not exercising.     Colon cancer screening  Last FIT test was 7/19/2018--negative.    Reviewed and updated as needed this visit by clinical staff  Tobacco  Allergies  Meds  Problems   Med Hx  Surg Hx  Fam Hx  Soc Hx          Reviewed and updated as needed this visit by Provider  Allergies  Meds  Problems        Social History     Tobacco Use     Smoking status: Current Every Day Smoker     Packs/day: 0.50     Types: Cigarettes     Smokeless tobacco: Never Used     Tobacco comment: 4-5 cig daily.    Substance Use Topics     Alcohol use: No     Alcohol/week: 0.0 oz     Alcohol Use 8/27/2019   Prescreen: >3 drinks/day or >7 drinks/week? No     Current providers sharing in care for this patient include:   Patient Care Team:  Twyla Nick MD as PCP - General (Family Practice)  Twyla Nick MD as Assigned PCP    The following health maintenance items are reviewed in Epic and correct as of today:  Health Maintenance   Topic Date Due     ZOSTER IMMUNIZATION (1 of 2) 05/20/1999     MEDICARE ANNUAL WELLNESS VISIT  07/10/2019     FALL RISK ASSESSMENT  07/10/2019     FIT  07/19/2019     INFLUENZA VACCINE (1) 09/01/2019     MAMMO SCREENING  07/13/2020     ADVANCE CARE PLANNING  05/05/2021     LIPID  07/10/2023     DTAP/TDAP/TD IMMUNIZATION (2 - Td) 01/12/2026     DEXA  Completed     SPIROMETRY  Completed     HEPATITIS C SCREENING  Completed     COPD ACTION PLAN  Completed     PHQ-2  Completed     PNEUMOCOCCAL IMMUNIZATION 65+ LOW/MEDIUM RISK  Completed     IPV IMMUNIZATION  Aged Out     MENINGITIS IMMUNIZATION  Aged Out     BP Readings from Last 3 Encounters:   08/27/19 110/80   07/02/19 114/70   06/04/19 110/72    Wt Readings from Last 3 Encounters:   08/27/19 120.2 kg (265 lb)   07/02/19 125.6 kg (277 lb)   06/04/19 125.6 kg (277 lb)          Recent Labs   Lab Test 04/18/19  0900 07/10/18  1034 04/18/17  0758 02/08/17  0922 02/02/16  0934  01/09/15  1204   LDL  --  155*  --  83 109*   < > 139*   HDL  --  49*  --  54 57   < >  --    TRIG  --  274*  --  217* 248*   < >  --    ALT 13 18 14  --   --    < > 16   CR 0.65 0.59 0.66  --   --    < > 0.62   GFRESTIMATED 90 >90 89  --   --   "  < > >90  Non  GFR Calc     GFRESTBLACK >90 >90 >90  African American GFR Calc    --   --    < > >90   GFR Calc     POTASSIUM 4.0 4.5 4.0  --   --    < > 4.3   TSH  --  2.25  --   --   --   --  2.03    < > = values in this interval not displayed.      Mammogram Screening: Mammogram Screening: Patient over age 50, mutual decision to screen reflected in health maintenance.    Review of Systems   Constitutional: Negative for chills.   HENT: Positive for congestion. Negative for ear pain.    Eyes: Negative for pain.   Respiratory: Positive for cough.    Cardiovascular: Negative for chest pain.   Gastrointestinal: Negative for abdominal pain, constipation, diarrhea and hematochezia.   Genitourinary: Negative for hematuria.   Neurological: Negative for dizziness.   Psychiatric/Behavioral: The patient is not nervous/anxious.      This document serves as a record of the services and decisions personally performed and made by Twyla Nick MD. It was created on her behalf by Donita Elliott, a trained medical scribe. The creation of this document is based on the provider's statements to the medical scribe.  Donita Elliott August 27, 2019 8:37 AM     OBJECTIVE:   /80 (BP Location: Right arm, Patient Position: Sitting, Cuff Size: Adult Large)   Pulse 72   Temp 97.6  F (36.4  C) (Oral)   Resp 20   Ht 1.702 m (5' 7\")   Wt 120.2 kg (265 lb)   SpO2 95%   BMI 41.50 kg/m   Estimated body mass index is 41.5 kg/m  as calculated from the following:    Height as of this encounter: 1.702 m (5' 7\").    Weight as of this encounter: 120.2 kg (265 lb).     Physical Exam  GENERAL APPEARANCE: healthy, alert and no distress  EYES: Eyes grossly normal to inspection and conjunctivae and sclerae normal  HENT: ear canals and TM's normal, nose and mouth without ulcers or lesions, oropharynx clear and oral mucous membranes moist  NECK: no adenopathy, no asymmetry, masses, or scars and thyroid normal to " palpation  RESP: lungs clear to auscultation - no rales, rhonchi or wheezes  CV: regular rate and rhythm, normal S1 S2, no S3 or S4, no murmur, click or rub, no peripheral edema and peripheral pulses strong  ABDOMEN: soft, nontender, no hepatosplenomegaly, no masses and bowel sounds normal  MS: no musculoskeletal defects are noted and gait is age appropriate without ataxia  SKIN: no suspicious lesions or rashes  NEURO: Normal strength and tone, sensory exam grossly normal, mentation intact and speech normal  PSYCH: mentation appears normal and affect normal/bright    Diagnostic Test Results:  Labs reviewed in Epic    No results found for this or any previous visit (from the past 24 hour(s)).      ASSESSMENT / PLAN:   (Z00.00) Encounter for Medicare annual wellness exam  (primary encounter diagnosis)  Comment: Normal physical exam. Recommended daily vitamin D supplement.   Plan: Fecal colorectal cancer screen (FIT), Lipid         panel reflex to direct LDL Non-fasting,         Comprehensive metabolic panel, CBC with         platelets, TSH with free T4 reflex          (Z87.891) Personal history of tobacco use  (F17.210) Smokes 1/2 pack/day or less  Comment: Patient is a current smoking and has a 30 yr smoking hx. Offered lung cancer screening, patient agreed. Discussed tobacco cessation, encouraged her to trial Chantix and let me know if it did not work for her to explore other options  Plan: Prof fee: Shared Decisionmaking for Lung Cancer        Screening, CT Chest Lung Cancer Scrn Low Dose         wo, Okay for Smoking Cessation Study (PLUTO) to        Contact Patient          (E78.2) Mixed hyperlipidemia  Comment: Due to higher risk of stroke, switched from atorvastatin to crestor. Reviewed and discussed potential side effects, will let me know if experiencing any.   Plan: rosuvastatin (CRESTOR) 40 MG tablet          (J44.9) Chronic obstructive pulmonary disease, unspecified COPD type (H)  Comment: ongoing cough and  "SOB with exertion. Educated patient that she is to use Combivent 3 x daily as a preventative measure. Advised patient that sudafed has potential to raise BP and recommended a non-sedating antihistamine and steriod nasal spray prn instead.   Plan: Ipratropium-Albuterol (COMBIVENT RESPIMAT)          MCG/ACT inhaler          (I87.2) Venous (peripheral) insufficiency  Comment: Using OTC compression stockings. Recommended she inquire about pricing for compression stockings at [facility] or [facility]. Encouraged patient to get up and move around every 1-2 hours to reduce risk of clots. Recommended aiming for daily exercise. Discussed pedal daily for 10 min    Depression: Offered antidepressant and counseling services, patient declined. Recommended daily exercise and continuing to participate in social activities at residence.     Urine incontinence: Offered medication options and referral to urology, patient declined. Will let me know if she changes her mind.       Follow up in 6 months    End of Life Planning:  Patient currently has an advanced directive: Yes.  Practitioner is supportive of decision.    COUNSELING:  Reviewed preventive health counseling, as reflected in patient instructions       Regular exercise       Healthy diet/nutrition       Bladder control       Consider lung cancer screening for ages 55-80 years and 30 pack-year smoking history     Estimated body mass index is 41.5 kg/m  as calculated from the following:    Height as of this encounter: 1.702 m (5' 7\").    Weight as of this encounter: 120.2 kg (265 lb).  Weight management plan: Discussed healthy diet and exercise guidelines   reports that she has been smoking cigarettes.  She has been smoking about 0.50 packs per day. She has never used smokeless tobacco.  Tobacco Cessation Action Plan: Pharmacotherapies : Chantix  Self help information given to patient    Appropriate preventive services were discussed with this patient, including " applicable screening as appropriate for cardiovascular disease, diabetes, osteopenia/osteoporosis, and glaucoma.  As appropriate for age/gender, discussed screening for colorectal cancer, prostate cancer, breast cancer, and cervical cancer. Checklist reviewing preventive services available has been given to the patient.    Reviewed patients plan of care and provided an AVS. The Basic Care Plan (routine screening as documented in Health Maintenance) for Calista meets the Care Plan requirement. This Care Plan has been established and reviewed with the Patient.    Counseling Resources:  ATP IV Guidelines  Pooled Cohorts Equation Calculator  Breast Cancer Risk Calculator  FRAX Risk Assessment  ICSI Preventive Guidelines  Dietary Guidelines for Americans, 2010  iversity's MyPlate  ASA Prophylaxis  Lung CA Screening      The information in this document, created by the medical scribe for me, accurately reflects the services I personally performed and the decisions made by me. I have reviewed and approved this document for accuracy prior to leaving the patient care area.  August 27, 2019 8:59 AM    Twyla Nick MD  [facility]    Identified Health Risks:    She is at risk for lack of exercise and has been provided with information to increase physical activity for the benefit of her well-being.  The patient was counseled and encouraged to consider modifying their diet and eating habits. She was provided with information on recommended healthy diet options.  Information on urinary incontinence and treatment options given to patient.

## 2019-08-27 NOTE — PATIENT INSTRUCTIONS
Patient Education   Personalized Prevention Plan  You are due for the preventive services outlined below.  Your care team is available to assist you in scheduling these services.  If you have already completed any of these items, please share that information with your care team to update in your medical record.  Health Maintenance Due   Topic Date Due     Zoster (Shingles) Vaccine (1 of 2) 05/20/1999     Annual Wellness Visit  07/10/2019     FALL RISK ASSESSMENT  07/10/2019     FIT Test  07/19/2019       Exercise for a Healthier Heart     Exercise with a friend. When activity is fun, you're more likely to stick with it.   You may wonder how you can improve the health of your heart. If you re thinking about exercise, you re on the right track. You don t need to become an athlete, but you do need a certain amount of brisk exercise to help strengthen your heart. If you have been diagnosed with a heart condition, your doctor may recommend exercise to help stabilize your condition. To help make exercise a habit, choose safe, fun activities.  Be sure to check with your healthcare provider before starting an exercise program.   Why exercise?  Exercising regularly offers many healthy rewards. It can help you do all of the following:    Improve your blood cholesterol level to help prevent further heart trouble    Lower your blood pressure to help prevent a stroke or heart attack    Control diabetes, or reduce your risk of getting this disease    Improve your heart and lung function    Reach and maintain a healthy weight    Make your muscles stronger and more limber so you can stay active    Prevent falls and fractures by slowing the loss of bone mass (osteoporosis)    Manage stress better    Reduce your blood pressure    Improve your sense of self and your body image  Exercise tips  Ease into your routine. Set small goals. Then build on them.  Exercise on most days. Aim for a total of 150 or more minutes of moderate to   vigorous intensity activity each week. Consider 40 minutes, 3 to 4 times a week. For best results, activity should last for 40 minutes on average. It is OK to work up to the 40 minute period over time. Examples of moderate-intensity activity is walking 1 mile in 15 minutes or 30 to 45 minutes of yard work.  Step up your daily activity level. Along with your exercise program, try being more active throughout the day. Walk instead of drive. Do more household tasks or yard work.  Choose one or more activities you enjoy. Walking is one of the easiest things you can do. You can also try swimming, riding a bike, dancing, or taking an exercise class.  Stop exercising and call your doctor if you:    Have chest pain or feel dizzy or lightheaded    Feel burning, tightness, pressure, or heaviness in your chest, neck, shoulders, back, or arms    Have unusual shortness of breath    Have increased joint or muscle pain    Have palpitations or an irregular heartbeat   Date Last Reviewed: 5/1/2016 2000-2018 [employer]. [address] [place] [address]. All rights reserved. This information is not intended as a substitute for professional medical care. Always follow your healthcare professional's instructions.          Understanding USDA MyPlate  The USDA (U.S. Department of Agriculture) has guidelines to help you make healthy food choices. These are called MyPlate. MyPlate shows the food groups that make up healthy meals using the image of a place setting. Before you eat, think about the healthiest choices for what to put onto your plate or into your cup or bowl. To learn more about building a healthy plate, visit www.choosemyplate.gov.    The food groups    Fruits. Any fruit or 100% fruit juice counts as part of the Fruit Group. Fruits may be fresh, canned, frozen, or dried, and may be whole, cut-up, or pureed. Make half your plate fruits and vegetables.    Vegetables. Any vegetable or 100% vegetable  juice counts as a member of the Vegetable Group. Vegetables may be fresh, frozen, canned, or dried. They can be served raw or cooked and may be whole, cut-up, or mashed. Make half your plate fruits and vegetables.    Grains. All foods made from grains are part of the Grains Group. These include wheat, rice, oats, cornmeal, and barley such as bread, pasta, oatmeal, cereal, tortillas, and grits. Grains should be no more than a quarter of your plate. At least half of your grains should be whole grains.    Protein. This group includes meat, poultry, seafood, beans and peas, eggs, processed soy products (like tofu), nuts (including nut butters), and seeds. Make protein choices no more than a quarter of your plate. Meat and poultry choices should be lean or low fat.    Dairy. All fluid milk products and foods made from milk that contain calcium, like yogurt and cheese, are part of the Dairy Group. (Foods that have little calcium, such as cream, butter, and cream cheese, are not part of the group.) Most dairy choices should be low-fat or fat-free.    Oils. These are fats that are liquid at room temperature. They include canola, corn, olive, soybean, and sunflower oil. Foods that are mainly oil include mayonnaise, certain salad dressings, and soft margarines. You should have only 5 to 7 teaspoons of oils a day. You probably already get this much from the food you eat.  Date Last Reviewed: 8/1/2017 2000-2018 [employer]. [address] [place] [address]. All rights reserved. This information is not intended as a substitute for professional medical care. Always follow your healthcare professional's instructions.          Urinary Incontinence, Female (Adult)  Urinary incontinence means loss of control of the bladder. This problem affects many women, especially as they get older. If you have incontinence, you may be embarrassed to ask for help. But know that this problem can be treated.  Types of  Incontinence  There are different types of incontinence. Two of the main types are described here. You can have more than one type.    Stress incontinence. With this type, urine leaks when pressure (stress) is put on the bladder. This may happen when you cough, sneeze, or laugh. Stress incontinence most often occurs because the pelvic floor muscles that support the bladder and urethra are weak. This can happen after pregnancy and vaginal childbirth or a hysterectomy. It can also be due to excess body weight or hormone changes.    Urge incontinence (also called overactive bladder). With this type, a sudden urge to urinate is felt often. This may happen even though there may not be much urine in the bladder. The need to urinate often during the night is common. Urge incontinence most often occurs because of bladder spasms. This may be due to bladder irritation or infection. Damage to bladder nerves or pelvic muscles, constipation, and certain medicines can also lead to urge incontinence.  Treatment of urinary incontinence depends on the cause. Further evaluation is needed to find the type you have. This will likely include an exam and certain tests. Based on the results, you and your healthcare provider can then plan treatment. Until a diagnosis is made, the home care tips below can help relieve symptoms.  Home care    Do pelvic floor muscle exercises, if they are prescribed. The pelvic floor muscles help support the bladder and urethra. Many women find that their symptoms improve when doing special exercises that strengthen these muscles. To do the exercises contract the muscles you would use to stop your stream of urine, but do this when you re not urinating. Hold for 10 seconds, then relax. Repeat 10 to 20 times in a row, at least 3 times a day. Your provider may give you other instructions for how to do the exercises and how often.    Keep a bladder diary. This helps track how often and how much you urinate over a  set period of time. Bring this diary with you to your next visit with the provider. The information can help your provider learn more about your bladder problem.    Lose weight, if advised to by your provider. Excess weight puts pressure on the bladder. Your provider can help you create a weight-loss plan that s right for you. This may include exercising more and making certain diet changes.    Don't consume foods and drinks that may irritate the bladder. These can include alcohol and caffeinated drinks.    Quit smoking. Smoking and other tobacco use can lead to chronic cough that strains the pelvic floor muscles. Smoking may also damage the bladder and urethra. Talk with your provider about treatments or methods you can use to quit smoking.    If drinking large amounts of fluid causes you to have symptoms, you may be advised to limit your fluid intake. You may also be advised to drink most of your fluids during the day and to limit fluids at night.    If you re worried about urine leakage or accidents, you may wear absorbent pads to catch urine. Change the pads often. This helps reduce discomfort. It may also reduce the risk of skin or bladder infections.  Follow-up care  Follow up with your healthcare provider, or as directed. It may take some to find the right treatment for your problem. Your treatment plan may include special therapies or medicines. Certain procedures or surgery may also be options. Be sure to discuss any questions you have with your provider.  When to seek medical advice  Call the healthcare provider right away if any of these occur:    Fever of 100.4 F (38 C) or higher, or as directed by your provider    Bladder pain or fullness    Abdominal swelling    Nausea or vomiting    Back pain    Weakness, dizziness or fainting  Date Last Reviewed: 10/1/2017    6697-4722 [employer]. [address] [place] [address], [place] [address]. All rights reserved. This information is not intended as a  substitute for professional medical care. Always follow your healthcare professional's instructions.           Patient Education   Personalized Prevention Plan  You are due for the preventive services outlined below.  Your care team is available to assist you in scheduling these services.  If you have already completed any of these items, please share that information with your care team to update in your medical record.  Health Maintenance Due   Topic Date Due     Zoster (Shingles) Vaccine (1 of 2) 05/20/1999     Annual Wellness Visit  07/10/2019     FALL RISK ASSESSMENT  07/10/2019     FIT Test  07/19/2019       Exercise for a Healthier Heart     Exercise with a friend. When activity is fun, you're more likely to stick with it.   You may wonder how you can improve the health of your heart. If you re thinking about exercise, you re on the right track. You don t need to become an athlete, but you do need a certain amount of brisk exercise to help strengthen your heart. If you have been diagnosed with a heart condition, your doctor may recommend exercise to help stabilize your condition. To help make exercise a habit, choose safe, fun activities.  Be sure to check with your healthcare provider before starting an exercise program.   Why exercise?  Exercising regularly offers many healthy rewards. It can help you do all of the following:    Improve your blood cholesterol level to help prevent further heart trouble    Lower your blood pressure to help prevent a stroke or heart attack    Control diabetes, or reduce your risk of getting this disease    Improve your heart and lung function    Reach and maintain a healthy weight    Make your muscles stronger and more limber so you can stay active    Prevent falls and fractures by slowing the loss of bone mass (osteoporosis)    Manage stress better    Reduce your blood pressure    Improve your sense of self and your body image  Exercise tips  Ease into your routine. Set  small goals. Then build on them.  Exercise on most days. Aim for a total of 150 or more minutes of moderate to  vigorous intensity activity each week. Consider 40 minutes, 3 to 4 times a week. For best results, activity should last for 40 minutes on average. It is OK to work up to the 40 minute period over time. Examples of moderate-intensity activity is walking 1 mile in 15 minutes or 30 to 45 minutes of yard work.  Step up your daily activity level. Along with your exercise program, try being more active throughout the day. Walk instead of drive. Do more household tasks or yard work.  Choose one or more activities you enjoy. Walking is one of the easiest things you can do. You can also try swimming, riding a bike, dancing, or taking an exercise class.  Stop exercising and call your doctor if you:    Have chest pain or feel dizzy or lightheaded    Feel burning, tightness, pressure, or heaviness in your chest, neck, shoulders, back, or arms    Have unusual shortness of breath    Have increased joint or muscle pain    Have palpitations or an irregular heartbeat   Date Last Reviewed: 5/1/2016 2000-2018 [employer]. [address] [place] [address]. All rights reserved. This information is not intended as a substitute for professional medical care. Always follow your healthcare professional's instructions.          Understanding USDA MyPlate  The USDA (U.S. Department of Agriculture) has guidelines to help you make healthy food choices. These are called MyPlate. MyPlate shows the food groups that make up healthy meals using the image of a place setting. Before you eat, think about the healthiest choices for what to put onto your plate or into your cup or bowl. To learn more about building a healthy plate, visit www.choosemyplate.gov.    The food groups    Fruits. Any fruit or 100% fruit juice counts as part of the Fruit Group. Fruits may be fresh, canned, frozen, or dried, and may be whole,  cut-up, or pureed. Make half your plate fruits and vegetables.    Vegetables. Any vegetable or 100% vegetable juice counts as a member of the Vegetable Group. Vegetables may be fresh, frozen, canned, or dried. They can be served raw or cooked and may be whole, cut-up, or mashed. Make half your plate fruits and vegetables.    Grains. All foods made from grains are part of the Grains Group. These include wheat, rice, oats, cornmeal, and barley such as bread, pasta, oatmeal, cereal, tortillas, and grits. Grains should be no more than a quarter of your plate. At least half of your grains should be whole grains.    Protein. This group includes meat, poultry, seafood, beans and peas, eggs, processed soy products (like tofu), nuts (including nut butters), and seeds. Make protein choices no more than a quarter of your plate. Meat and poultry choices should be lean or low fat.    Dairy. All fluid milk products and foods made from milk that contain calcium, like yogurt and cheese, are part of the Dairy Group. (Foods that have little calcium, such as cream, butter, and cream cheese, are not part of the group.) Most dairy choices should be low-fat or fat-free.    Oils. These are fats that are liquid at room temperature. They include canola, corn, olive, soybean, and sunflower oil. Foods that are mainly oil include mayonnaise, certain salad dressings, and soft margarines. You should have only 5 to 7 teaspoons of oils a day. You probably already get this much from the food you eat.  Date Last Reviewed: 8/1/2017 2000-2018 [employer]. [address] [place] [address]. All rights reserved. This information is not intended as a substitute for professional medical care. Always follow your healthcare professional's instructions.          Urinary Incontinence, Female (Adult)  Urinary incontinence means loss of control of the bladder. This problem affects many women, especially as they get older. If you have  incontinence, you may be embarrassed to ask for help. But know that this problem can be treated.  Types of Incontinence  There are different types of incontinence. Two of the main types are described here. You can have more than one type.    Stress incontinence. With this type, urine leaks when pressure (stress) is put on the bladder. This may happen when you cough, sneeze, or laugh. Stress incontinence most often occurs because the pelvic floor muscles that support the bladder and urethra are weak. This can happen after pregnancy and vaginal childbirth or a hysterectomy. It can also be due to excess body weight or hormone changes.    Urge incontinence (also called overactive bladder). With this type, a sudden urge to urinate is felt often. This may happen even though there may not be much urine in the bladder. The need to urinate often during the night is common. Urge incontinence most often occurs because of bladder spasms. This may be due to bladder irritation or infection. Damage to bladder nerves or pelvic muscles, constipation, and certain medicines can also lead to urge incontinence.  Treatment of urinary incontinence depends on the cause. Further evaluation is needed to find the type you have. This will likely include an exam and certain tests. Based on the results, you and your healthcare provider can then plan treatment. Until a diagnosis is made, the home care tips below can help relieve symptoms.  Home care    Do pelvic floor muscle exercises, if they are prescribed. The pelvic floor muscles help support the bladder and urethra. Many women find that their symptoms improve when doing special exercises that strengthen these muscles. To do the exercises contract the muscles you would use to stop your stream of urine, but do this when you re not urinating. Hold for 10 seconds, then relax. Repeat 10 to 20 times in a row, at least 3 times a day. Your provider may give you other instructions for how to do the  exercises and how often.    Keep a bladder diary. This helps track how often and how much you urinate over a set period of time. Bring this diary with you to your next visit with the provider. The information can help your provider learn more about your bladder problem.    Lose weight, if advised to by your provider. Excess weight puts pressure on the bladder. Your provider can help you create a weight-loss plan that s right for you. This may include exercising more and making certain diet changes.    Don't consume foods and drinks that may irritate the bladder. These can include alcohol and caffeinated drinks.    Quit smoking. Smoking and other tobacco use can lead to chronic cough that strains the pelvic floor muscles. Smoking may also damage the bladder and urethra. Talk with your provider about treatments or methods you can use to quit smoking.    If drinking large amounts of fluid causes you to have symptoms, you may be advised to limit your fluid intake. You may also be advised to drink most of your fluids during the day and to limit fluids at night.    If you re worried about urine leakage or accidents, you may wear absorbent pads to catch urine. Change the pads often. This helps reduce discomfort. It may also reduce the risk of skin or bladder infections.  Follow-up care  Follow up with your healthcare provider, or as directed. It may take some to find the right treatment for your problem. Your treatment plan may include special therapies or medicines. Certain procedures or surgery may also be options. Be sure to discuss any questions you have with your provider.  When to seek medical advice  Call the healthcare provider right away if any of these occur:    Fever of 100.4 F (38 C) or higher, or as directed by your provider    Bladder pain or fullness    Abdominal swelling    Nausea or vomiting    Back pain    Weakness, dizziness or fainting  Date Last Reviewed: 10/1/2017    8132-3849 [employer]. [address] [place] [address]. All rights reserved. This information is not intended as a substitute for professional medical care. Always follow your healthcare professional's instructions.           Lung Cancer Screening   Frequently Asked Questions  If you are at high-risk for lung cancer, getting screened with low-dose computed tomography (LDCT) every year can help save your life. This handout offers answers to some of the most common questions about lung cancer screening. If you have other questions, please call [phone] ([phone]).     What is it?  Lung cancer screening uses special X-ray technology to create an image of your lung tissue. The exam is quick and easy and takes less than 10 seconds. We don t give you any medicine or use any needles. You can eat before and after the exam. You don t need to change your clothes as long as the clothing on your chest doesn t contain metal. But, you do need to be able to hold your breath for at least 6 seconds during the exam.    What is the goal of lung cancer screening?  The goal of lung cancer screening is to save lives. Many times, lung cancer is not found until a person starts having physical symptoms. Lung cancer screening can help detect lung cancer in the earliest stages when it may be easier to treat.    Who should be screened for lung cancer?  We suggest lung cancer screening for anyone who is at high-risk for lung cancer. You are in the high-risk group if you:      are between the ages of 55 and 79, and    have smoked at least 1 pack of cigarettes a day for 30 or more years, and    still smoke or have quit within the past 15 years.    However, if you have a new cough or shortness of breath, you should talk to your doctor before being screened.    Some national lung health advocacy groups also recommend screening for people ages 50 to 79 who have smoked an average of 1 pack of cigarettes a day for 20 years. They must also have  at least 1 other risk factor for lung cancer, not including exposure to secondhand smoke. Other risk factors are having had cancer in the past, emphysema, pulmonary fibrosis, COPD, a family history of lung cancer, or exposure to certain materials such as arsenic, asbestos, beryllium, cadmium, chromium, diesel fumes, nickel, radon or silica. Your care team can help you know if you have one of these risk factors.     Why does it matter if I have symptoms?  Certain symptoms can be a sign that you have a condition in your lungs that should be checked and treated by your doctor. These symptoms include fever, chest pain, a new or changing cough, shortness of breath that you have never felt before, coughing up blood or unexplained weight loss. Having any of these symptoms can greatly affect the results of lung cancer screening.       Should all smokers get an LDCT lung cancer screening exam?  It depends. Lung cancer screening is for a very specific group of men and women who have a history of heavy smoking over a long period of time (see  Who should be screened for lung cancer  above).  I am in the high-risk group, but have been diagnosed with cancer in the past. Is LDCT lung cancer screening right for me?  In some cases, you should not have LDCT lung screening, such as when your doctor is already following your cancer with CT scan studies. Your doctor will help you decide if LDCT lung screening is right for you.  Do I need to have a screening exam every year?  Yes. If you are in the high-risk group described earlier, you should get an LDCT lung cancer screening exam every year until you are 79, or are no longer willing or able to undergo screening and possible procedures to diagnose and treat lung cancer.  How effective is LDCT at preventing death from lung cancer?  Studies have shown that LDCT lung cancer screening can lower the risk of death from lung cancer by 20 percent in people who are at high-risk.  What are the  risks?  There are some risks and limitations of LDCT lung cancer screening. We want to make sure you understand the risks and benefits, so please let us know if you have any questions. Your doctor may want to talk with you more about these risks.    Radiation exposure: As with any exam that uses radiation, there is a very small increased risk of cancer. The amount of radiation in LDCT is small--about the same amount a person would get from a mammogram. Your doctor orders the exam when he or she feels the potential benefits outweigh the risks.    False negatives: No test is perfect, including LDCT. It is possible that you may have a medical condition, including lung cancer, that is not found during your exam. This is called a false negative result.    False positives and more testing: LDCT very often finds something in the lung that could be cancer, but in fact is not. This is called a false positive result. False positive tests often cause anxiety. To make sure these findings are not cancer, you may need to have more tests. These tests will be done only if you give us permission. Sometimes patients need a treatment that can have side effects, such as a biopsy. For more information on false positives, see  What can I expect from the results?     Findings not related to lung cancer: Your LDCT exam also takes pictures of areas of your body next to your lungs. In a very small number of cases, the CT scan will show an abnormal finding in one of these areas, such as your kidneys, adrenal glands, liver or thyroid. This finding may not be serious, but you may need more tests. Your doctor can help you decide what other tests you may need, if any.  What can I expect from the results?  About 1 out of 4 LDCT exams will find something that may need more tests. Most of the time, these findings are lung nodules. Lung nodules are very small collections of tissue in the lung. These nodules are very common, and the vast  majority--more than 97 percent--are not cancer (benign). Most are normal lymph nodes or small areas of scarring from past infections.  But, if a small lung nodule is found to be cancer, the cancer can be cured more than 90 percent of the time. To know if the nodule is cancer, we may need to get more images before your next yearly screening exam. If the nodule has suspicious features (for example, it is large, has an odd shape or grows over time), we will refer you to a specialist for further testing.  Will my doctor also get the results?  Yes. Your doctor will get a copy of your results.  Is it okay to keep smoking now that there s a cancer screening exam?  No. Tobacco is one of the strongest cancer-causing agents. It causes not only lung cancer, but other cancers and cardiovascular (heart) diseases as well. The damage caused by smoking builds over time. This means that the longer you smoke, the higher your risk of disease. While it is never too late to quit, the sooner you quit, the better.  Where can I find help to quit smoking?  The best way to prevent lung cancer is to stop smoking. If you have already quit smoking, congratulations and keep it up! For help on quitting smoking, please call [employer] at 4-571-278-AVBW ([phone]) or the [employer] at 1-589.625.2382 to find local resources near you.  One-on-one health coaching:  If you d prefer to work individually with a health care provider on tobacco cessation, we offer:      Medication Therapy Management:  Our specially trained pharmacists work closely with you and your doctor to help you quit smoking.  Call [phone] or [phone] (toll free).     Can Do: Health coaching offered by [facility].  [email or web address]

## 2019-08-28 LAB
ALBUMIN SERPL-MCNC: 3.4 G/DL (ref 3.4–5)
ALP SERPL-CCNC: 83 U/L (ref 40–150)
ALT SERPL W P-5'-P-CCNC: 13 U/L (ref 0–50)
ANION GAP SERPL CALCULATED.3IONS-SCNC: 9 MMOL/L (ref 3–14)
AST SERPL W P-5'-P-CCNC: 11 U/L (ref 0–45)
BILIRUB SERPL-MCNC: 0.4 MG/DL (ref 0.2–1.3)
BUN SERPL-MCNC: 15 MG/DL (ref 7–30)
CALCIUM SERPL-MCNC: 8.7 MG/DL (ref 8.5–10.1)
CHLORIDE SERPL-SCNC: 105 MMOL/L (ref 94–109)
CHOLEST SERPL-MCNC: 187 MG/DL
CO2 SERPL-SCNC: 26 MMOL/L (ref 20–32)
CREAT SERPL-MCNC: 0.64 MG/DL (ref 0.52–1.04)
GFR SERPL CREATININE-BSD FRML MDRD: >90 ML/MIN/{1.73_M2}
GLUCOSE SERPL-MCNC: 87 MG/DL (ref 70–99)
HDLC SERPL-MCNC: 47 MG/DL
LDLC SERPL CALC-MCNC: 93 MG/DL
NONHDLC SERPL-MCNC: 140 MG/DL
POTASSIUM SERPL-SCNC: 4.2 MMOL/L (ref 3.4–5.3)
PROT SERPL-MCNC: 6.8 G/DL (ref 6.8–8.8)
SODIUM SERPL-SCNC: 140 MMOL/L (ref 133–144)
TRIGL SERPL-MCNC: 235 MG/DL
TSH SERPL DL<=0.005 MIU/L-ACNC: 2.05 MU/L (ref 0.4–4)

## 2019-08-28 ASSESSMENT — PATIENT HEALTH QUESTIONNAIRE - PHQ9: SUM OF ALL RESPONSES TO PHQ QUESTIONS 1-9: 8

## 2019-09-04 PROCEDURE — 82274 ASSAY TEST FOR BLOOD FECAL: CPT | Performed by: FAMILY MEDICINE

## 2019-09-08 LAB — HEMOCCULT STL QL IA: NEGATIVE

## 2019-09-09 DIAGNOSIS — Z00.00 ENCOUNTER FOR MEDICARE ANNUAL WELLNESS EXAM: ICD-10-CM

## 2019-09-18 ENCOUNTER — HOSPITAL ENCOUNTER (OUTPATIENT)
Dept: CT IMAGING | Facility: CLINIC | Age: 69
End: 2019-09-18
Attending: FAMILY MEDICINE
Payer: COMMERCIAL

## 2019-09-18 DIAGNOSIS — Z87.891 PERSONAL HISTORY OF TOBACCO USE: ICD-10-CM

## 2019-09-18 PROCEDURE — G0297 LDCT FOR LUNG CA SCREEN: HCPCS

## 2019-10-01 ENCOUNTER — OFFICE VISIT (OUTPATIENT)
Dept: SURGERY | Facility: CLINIC | Age: 69
End: 2019-10-01
Attending: INTERNAL MEDICINE
Payer: COMMERCIAL

## 2019-10-01 VITALS
WEIGHT: 265 LBS | DIASTOLIC BLOOD PRESSURE: 78 MMHG | HEIGHT: 67 IN | OXYGEN SATURATION: 94 % | SYSTOLIC BLOOD PRESSURE: 124 MMHG | RESPIRATION RATE: 16 BRPM | HEART RATE: 64 BPM | BODY MASS INDEX: 41.59 KG/M2

## 2019-10-01 DIAGNOSIS — I87.2 VENOUS (PERIPHERAL) INSUFFICIENCY: ICD-10-CM

## 2019-10-01 DIAGNOSIS — Z86.72 PERSONAL HISTORY OF THROMBOPHLEBITIS: ICD-10-CM

## 2019-10-01 DIAGNOSIS — I65.22 RECURRENT CAROTID STENOSIS, LEFT: ICD-10-CM

## 2019-10-01 PROCEDURE — 99214 OFFICE O/P EST MOD 30 MIN: CPT | Performed by: INTERNAL MEDICINE

## 2019-10-01 ASSESSMENT — MIFFLIN-ST. JEOR: SCORE: 1754.66

## 2019-10-01 NOTE — PROGRESS NOTES
Vascular Medicine Progress Note     Calista Atkinson is a 69 year old female who is here for follow-up on superficial thrombophlebitis/bilateral venous insufficiency more pronounced on the right side    Interval History   Patient is doing very well, she is wearing compression stockings, 20 to 30 mmHg, thigh-high bilateral, symptoms are almost gone/reversed  Superficial thrombophlebitis resolved, patient is up-to-date on screening    Physical Exam       BP: 124/78 Pulse: 64   Resp: 16 SpO2: 94 %      Vitals:    10/01/19 0839   Weight: 120.2 kg (265 lb)     Vital Signs with Ranges  Pulse:  [64] 64  Resp:  [16] 16  BP: (124)/(78) 124/78  SpO2:  [94 %] 94 %  [unfilled]    Constitutional: awake, alert, cooperative, no apparent distress, and appears stated age  Eyes: Lids and lashes normal, pupils equal, round and reactive to light, extra ocular muscles intact, sclera clear, conjunctiva normal  ENT: normocepalic, without obvious abnormality, oropharynx pink and moist  Hematologic / Lymphatic: no lymphadenopathy  Respiratory: No increased work of breathing, good air exchange, clear to auscultation bilaterally, no crackles or wheezing  Cardiovascular: regular rate and rhythm, normal S1 and S2 and no murmur noted  GI: Normal bowel sounds, soft, non-distended, non-tender  Skin: no redness, warmth, or swelling, no rashes and no lesions  Musculoskeletal: There is no redness, warmth, or swelling of the joints.  Full range of motion noted.  Motor strength is 5 out of 5 all extremities bilaterally.  Tone is normal.  Neurologic: Awake, alert, oriented to name, place and time.  Cranial nerves II-XII are grossly intact.  Motor is 5 out of 5 bilaterally.    Neuropsychiatric:  Normal affect, memory, insight.  Pulses: Intact pulsations  No results found for this or any previous visit (from the past 24 hour(s)).. No carotid bruits appreciated.     Medications         Data   No results found for this or any previous visit (from the  past 24 hour(s)).    Assessment & Plan   (I65.22) Recurrent carotid stenosis, left  Comment: Follow-up in 1 year with carotid Doppler ultrasound      (Z86.72) Personal history of thrombophlebitis  Comment: Resolved      (I87.2) Venous (peripheral) insufficiency  Comment: Continue wearing compression treatment  Plan: Follow-up in 1 year        Chan Staples MD

## 2019-10-17 ENCOUNTER — OFFICE VISIT (OUTPATIENT)
Dept: PEDIATRICS | Facility: CLINIC | Age: 69
End: 2019-10-17
Attending: FAMILY MEDICINE
Payer: COMMERCIAL

## 2019-10-17 ENCOUNTER — HOSPITAL ENCOUNTER (OUTPATIENT)
Facility: CLINIC | Age: 69
Setting detail: OBSERVATION
Discharge: HOME | End: 2019-10-18
Attending: EMERGENCY MEDICINE | Admitting: INTERNAL MEDICINE
Payer: COMMERCIAL

## 2019-10-17 ENCOUNTER — APPOINTMENT (OUTPATIENT)
Dept: CT IMAGING | Facility: CLINIC | Age: 69
End: 2019-10-17
Attending: EMERGENCY MEDICINE
Payer: COMMERCIAL

## 2019-10-17 ENCOUNTER — OFFICE VISIT (OUTPATIENT)
Dept: FAMILY MEDICINE | Facility: CLINIC | Age: 69
End: 2019-10-17
Payer: COMMERCIAL

## 2019-10-17 VITALS
DIASTOLIC BLOOD PRESSURE: 59 MMHG | OXYGEN SATURATION: 96 % | HEIGHT: 67 IN | BODY MASS INDEX: 38.61 KG/M2 | TEMPERATURE: 96.8 F | RESPIRATION RATE: 26 BRPM | WEIGHT: 246 LBS | SYSTOLIC BLOOD PRESSURE: 115 MMHG | HEART RATE: 74 BPM

## 2019-10-17 VITALS
HEIGHT: 67 IN | TEMPERATURE: 97.6 F | HEART RATE: 61 BPM | WEIGHT: 246 LBS | RESPIRATION RATE: 16 BRPM | BODY MASS INDEX: 38.61 KG/M2 | SYSTOLIC BLOOD PRESSURE: 126 MMHG | OXYGEN SATURATION: 94 % | DIASTOLIC BLOOD PRESSURE: 64 MMHG

## 2019-10-17 DIAGNOSIS — E86.0 DEHYDRATION: ICD-10-CM

## 2019-10-17 DIAGNOSIS — E87.6 HYPOKALEMIA: ICD-10-CM

## 2019-10-17 DIAGNOSIS — E87.6 HYPOKALEMIA: Primary | ICD-10-CM

## 2019-10-17 DIAGNOSIS — K21.9 GASTROESOPHAGEAL REFLUX DISEASE, ESOPHAGITIS PRESENCE NOT SPECIFIED: Primary | ICD-10-CM

## 2019-10-17 DIAGNOSIS — N85.2 UTERINE ENLARGEMENT: ICD-10-CM

## 2019-10-17 DIAGNOSIS — R63.4 WEIGHT LOSS: ICD-10-CM

## 2019-10-17 DIAGNOSIS — R10.13 ABDOMINAL PAIN, EPIGASTRIC: ICD-10-CM

## 2019-10-17 DIAGNOSIS — R10.9 ACUTE ABDOMINAL PAIN: Primary | ICD-10-CM

## 2019-10-17 DIAGNOSIS — I10 ESSENTIAL HYPERTENSION: ICD-10-CM

## 2019-10-17 LAB
ALBUMIN SERPL-MCNC: 3.7 G/DL (ref 3.4–5)
ALP SERPL-CCNC: 78 U/L (ref 40–150)
ALT SERPL W P-5'-P-CCNC: 10 U/L (ref 0–50)
AMYLASE SERPL-CCNC: 36 U/L (ref 30–110)
ANION GAP SERPL CALCULATED.3IONS-SCNC: 6 MMOL/L (ref 3–14)
ANION GAP SERPL CALCULATED.3IONS-SCNC: 7 MMOL/L (ref 3–14)
AST SERPL W P-5'-P-CCNC: 19 U/L (ref 0–45)
BASOPHILS # BLD AUTO: 0 10E9/L (ref 0–0.2)
BASOPHILS NFR BLD AUTO: 0.4 %
BILIRUB SERPL-MCNC: 1.1 MG/DL (ref 0.2–1.3)
BUN SERPL-MCNC: 23 MG/DL (ref 7–30)
BUN SERPL-MCNC: 24 MG/DL (ref 7–30)
CALCIUM SERPL-MCNC: 9.2 MG/DL (ref 8.5–10.1)
CALCIUM SERPL-MCNC: 9.5 MG/DL (ref 8.5–10.1)
CHLORIDE SERPL-SCNC: 91 MMOL/L (ref 94–109)
CHLORIDE SERPL-SCNC: 92 MMOL/L (ref 94–109)
CO2 SERPL-SCNC: 35 MMOL/L (ref 20–32)
CO2 SERPL-SCNC: 38 MMOL/L (ref 20–32)
CREAT SERPL-MCNC: 1.33 MG/DL (ref 0.52–1.04)
CREAT SERPL-MCNC: 1.41 MG/DL (ref 0.52–1.04)
DIFFERENTIAL METHOD BLD: ABNORMAL
EOSINOPHIL # BLD AUTO: 0.1 10E9/L (ref 0–0.7)
EOSINOPHIL NFR BLD AUTO: 1 %
ERYTHROCYTE [DISTWIDTH] IN BLOOD BY AUTOMATED COUNT: 12.7 % (ref 10–15)
GFR SERPL CREATININE-BSD FRML MDRD: 38 ML/MIN/{1.73_M2}
GFR SERPL CREATININE-BSD FRML MDRD: 40 ML/MIN/{1.73_M2}
GLUCOSE SERPL-MCNC: 108 MG/DL (ref 70–99)
GLUCOSE SERPL-MCNC: 97 MG/DL (ref 70–99)
HCT VFR BLD AUTO: 47.5 % (ref 35–47)
HGB BLD-MCNC: 16 G/DL (ref 11.7–15.7)
IMM GRANULOCYTES # BLD: 0 10E9/L (ref 0–0.4)
IMM GRANULOCYTES NFR BLD: 0.4 %
LIPASE SERPL-CCNC: 113 U/L (ref 73–393)
LYMPHOCYTES # BLD AUTO: 1.7 10E9/L (ref 0.8–5.3)
LYMPHOCYTES NFR BLD AUTO: 17.9 %
MAGNESIUM SERPL-MCNC: 1.9 MG/DL (ref 1.6–2.3)
MCH RBC QN AUTO: 29.6 PG (ref 26.5–33)
MCHC RBC AUTO-ENTMCNC: 33.7 G/DL (ref 31.5–36.5)
MCV RBC AUTO: 88 FL (ref 78–100)
MONOCYTES # BLD AUTO: 0.8 10E9/L (ref 0–1.3)
MONOCYTES NFR BLD AUTO: 7.8 %
NEUTROPHILS # BLD AUTO: 7 10E9/L (ref 1.6–8.3)
NEUTROPHILS NFR BLD AUTO: 72.5 %
NRBC # BLD AUTO: 0 10*3/UL
NRBC BLD AUTO-RTO: 0 /100
PLATELET # BLD AUTO: 178 10E9/L (ref 150–450)
POTASSIUM SERPL-SCNC: 2.3 MMOL/L (ref 3.4–5.3)
POTASSIUM SERPL-SCNC: 2.5 MMOL/L (ref 3.4–5.3)
POTASSIUM SERPL-SCNC: 2.5 MMOL/L (ref 3.4–5.3)
PROT SERPL-MCNC: 7.4 G/DL (ref 6.8–8.8)
RBC # BLD AUTO: 5.4 10E12/L (ref 3.8–5.2)
SODIUM SERPL-SCNC: 134 MMOL/L (ref 133–144)
SODIUM SERPL-SCNC: 135 MMOL/L (ref 133–144)
WBC # BLD AUTO: 9.7 10E9/L (ref 4–11)

## 2019-10-17 PROCEDURE — 93005 ELECTROCARDIOGRAM TRACING: CPT | Mod: 76

## 2019-10-17 PROCEDURE — 80048 BASIC METABOLIC PNL TOTAL CA: CPT | Performed by: EMERGENCY MEDICINE

## 2019-10-17 PROCEDURE — 99207 REFERRAL TO ACUTE AND DIAGNOSTIC SERVICES: CPT | Performed by: FAMILY MEDICINE

## 2019-10-17 PROCEDURE — 99220 ZZC INITIAL OBSERVATION CARE,LEVL III: CPT | Performed by: INTERNAL MEDICINE

## 2019-10-17 PROCEDURE — 25000132 ZZH RX MED GY IP 250 OP 250 PS 637: Performed by: INTERNAL MEDICINE

## 2019-10-17 PROCEDURE — 96365 THER/PROPH/DIAG IV INF INIT: CPT

## 2019-10-17 PROCEDURE — G0378 HOSPITAL OBSERVATION PER HR: HCPCS

## 2019-10-17 PROCEDURE — 25800030 ZZH RX IP 258 OP 636: Performed by: INTERNAL MEDICINE

## 2019-10-17 PROCEDURE — 25000132 ZZH RX MED GY IP 250 OP 250 PS 637: Performed by: EMERGENCY MEDICINE

## 2019-10-17 PROCEDURE — 96361 HYDRATE IV INFUSION ADD-ON: CPT

## 2019-10-17 PROCEDURE — 25000128 H RX IP 250 OP 636: Performed by: EMERGENCY MEDICINE

## 2019-10-17 PROCEDURE — 99285 EMERGENCY DEPT VISIT HI MDM: CPT | Mod: 25

## 2019-10-17 PROCEDURE — 36415 COLL VENOUS BLD VENIPUNCTURE: CPT | Performed by: INTERNAL MEDICINE

## 2019-10-17 PROCEDURE — 80053 COMPREHEN METABOLIC PANEL: CPT | Performed by: PHYSICIAN ASSISTANT

## 2019-10-17 PROCEDURE — 83690 ASSAY OF LIPASE: CPT | Performed by: PHYSICIAN ASSISTANT

## 2019-10-17 PROCEDURE — 99207 ZZC OFFICE-HOSPITAL ADMIT: CPT | Performed by: PHYSICIAN ASSISTANT

## 2019-10-17 PROCEDURE — 82150 ASSAY OF AMYLASE: CPT | Performed by: PHYSICIAN ASSISTANT

## 2019-10-17 PROCEDURE — 93000 ELECTROCARDIOGRAM COMPLETE: CPT | Performed by: PHYSICIAN ASSISTANT

## 2019-10-17 PROCEDURE — 74176 CT ABD & PELVIS W/O CONTRAST: CPT

## 2019-10-17 PROCEDURE — 96375 TX/PRO/DX INJ NEW DRUG ADDON: CPT

## 2019-10-17 PROCEDURE — 85025 COMPLETE CBC W/AUTO DIFF WBC: CPT | Performed by: PHYSICIAN ASSISTANT

## 2019-10-17 PROCEDURE — 84132 ASSAY OF SERUM POTASSIUM: CPT | Performed by: INTERNAL MEDICINE

## 2019-10-17 PROCEDURE — 83735 ASSAY OF MAGNESIUM: CPT | Performed by: INTERNAL MEDICINE

## 2019-10-17 RX ORDER — MAGNESIUM SULFATE HEPTAHYDRATE 40 MG/ML
4 INJECTION, SOLUTION INTRAVENOUS EVERY 4 HOURS PRN
Status: DISCONTINUED | OUTPATIENT
Start: 2019-10-17 | End: 2019-10-18 | Stop reason: HOSPADM

## 2019-10-17 RX ORDER — NALOXONE HYDROCHLORIDE 0.4 MG/ML
.1-.4 INJECTION, SOLUTION INTRAMUSCULAR; INTRAVENOUS; SUBCUTANEOUS
Status: DISCONTINUED | OUTPATIENT
Start: 2019-10-17 | End: 2019-10-18 | Stop reason: HOSPADM

## 2019-10-17 RX ORDER — POTASSIUM CHLORIDE 1500 MG/1
20-40 TABLET, EXTENDED RELEASE ORAL
Status: DISCONTINUED | OUTPATIENT
Start: 2019-10-17 | End: 2019-10-18 | Stop reason: HOSPADM

## 2019-10-17 RX ORDER — ACETAMINOPHEN 500 MG
1000 TABLET ORAL AT BEDTIME
COMMUNITY

## 2019-10-17 RX ORDER — POTASSIUM CHLORIDE 1500 MG/1
20 TABLET, EXTENDED RELEASE ORAL
Status: CANCELLED | OUTPATIENT
Start: 2019-10-17

## 2019-10-17 RX ORDER — POTASSIUM CHLORIDE 1.5 G/1.58G
20-40 POWDER, FOR SOLUTION ORAL
Status: DISCONTINUED | OUTPATIENT
Start: 2019-10-17 | End: 2019-10-18 | Stop reason: HOSPADM

## 2019-10-17 RX ORDER — ASPIRIN 81 MG/1
81 TABLET ORAL EVERY MORNING
COMMUNITY

## 2019-10-17 RX ORDER — ROSUVASTATIN CALCIUM 20 MG/1
40 TABLET, COATED ORAL AT BEDTIME
Status: DISCONTINUED | OUTPATIENT
Start: 2019-10-17 | End: 2019-10-18 | Stop reason: HOSPADM

## 2019-10-17 RX ORDER — SODIUM CHLORIDE 9 MG/ML
INJECTION, SOLUTION INTRAVENOUS ONCE
Status: DISCONTINUED | OUTPATIENT
Start: 2019-10-17 | End: 2019-10-17

## 2019-10-17 RX ORDER — POTASSIUM CHLORIDE 29.8 MG/ML
20 INJECTION INTRAVENOUS
Status: DISCONTINUED | OUTPATIENT
Start: 2019-10-17 | End: 2019-10-18 | Stop reason: HOSPADM

## 2019-10-17 RX ORDER — POTASSIUM CL/LIDO/0.9 % NACL 10MEQ/0.1L
10 INTRAVENOUS SOLUTION, PIGGYBACK (ML) INTRAVENOUS ONCE
Status: COMPLETED | OUTPATIENT
Start: 2019-10-17 | End: 2019-10-17

## 2019-10-17 RX ORDER — ONDANSETRON 2 MG/ML
4 INJECTION INTRAMUSCULAR; INTRAVENOUS
Status: COMPLETED | OUTPATIENT
Start: 2019-10-17 | End: 2019-10-17

## 2019-10-17 RX ORDER — ALBUTEROL SULFATE 90 UG/1
2 AEROSOL, METERED RESPIRATORY (INHALATION) EVERY 6 HOURS PRN
Status: DISCONTINUED | OUTPATIENT
Start: 2019-10-17 | End: 2019-10-18 | Stop reason: HOSPADM

## 2019-10-17 RX ORDER — POTASSIUM CL/LIDO/0.9 % NACL 10MEQ/0.1L
10 INTRAVENOUS SOLUTION, PIGGYBACK (ML) INTRAVENOUS
Status: DISCONTINUED | OUTPATIENT
Start: 2019-10-17 | End: 2019-10-18 | Stop reason: HOSPADM

## 2019-10-17 RX ORDER — ACETAMINOPHEN 650 MG/1
650 SUPPOSITORY RECTAL EVERY 4 HOURS PRN
Status: DISCONTINUED | OUTPATIENT
Start: 2019-10-17 | End: 2019-10-18 | Stop reason: HOSPADM

## 2019-10-17 RX ORDER — ASPIRIN 81 MG/1
81 TABLET ORAL DAILY
Status: DISCONTINUED | OUTPATIENT
Start: 2019-10-18 | End: 2019-10-18 | Stop reason: HOSPADM

## 2019-10-17 RX ORDER — ONDANSETRON 2 MG/ML
4 INJECTION INTRAMUSCULAR; INTRAVENOUS EVERY 6 HOURS PRN
Status: DISCONTINUED | OUTPATIENT
Start: 2019-10-17 | End: 2019-10-18 | Stop reason: HOSPADM

## 2019-10-17 RX ORDER — POTASSIUM CHLORIDE 1500 MG/1
40 TABLET, EXTENDED RELEASE ORAL ONCE
Status: COMPLETED | OUTPATIENT
Start: 2019-10-17 | End: 2019-10-17

## 2019-10-17 RX ORDER — ACETAMINOPHEN 325 MG/1
650 TABLET ORAL EVERY 4 HOURS PRN
Status: DISCONTINUED | OUTPATIENT
Start: 2019-10-17 | End: 2019-10-18 | Stop reason: HOSPADM

## 2019-10-17 RX ORDER — ACETAMINOPHEN 500 MG
1000 TABLET ORAL AT BEDTIME
Status: DISCONTINUED | OUTPATIENT
Start: 2019-10-17 | End: 2019-10-18 | Stop reason: HOSPADM

## 2019-10-17 RX ORDER — METOPROLOL TARTRATE 25 MG/1
25 TABLET, FILM COATED ORAL 2 TIMES DAILY
Status: DISCONTINUED | OUTPATIENT
Start: 2019-10-17 | End: 2019-10-18 | Stop reason: HOSPADM

## 2019-10-17 RX ORDER — POTASSIUM CHLORIDE 7.45 MG/ML
10 INJECTION INTRAVENOUS
Status: DISCONTINUED | OUTPATIENT
Start: 2019-10-17 | End: 2019-10-18 | Stop reason: HOSPADM

## 2019-10-17 RX ORDER — SODIUM CHLORIDE 9 MG/ML
INJECTION, SOLUTION INTRAVENOUS CONTINUOUS
Status: DISCONTINUED | OUTPATIENT
Start: 2019-10-17 | End: 2019-10-18 | Stop reason: HOSPADM

## 2019-10-17 RX ORDER — ONDANSETRON 4 MG/1
4 TABLET, ORALLY DISINTEGRATING ORAL EVERY 6 HOURS PRN
Status: DISCONTINUED | OUTPATIENT
Start: 2019-10-17 | End: 2019-10-18 | Stop reason: HOSPADM

## 2019-10-17 RX ORDER — ALBUTEROL SULFATE 0.83 MG/ML
2.5 SOLUTION RESPIRATORY (INHALATION) EVERY 6 HOURS PRN
Status: DISCONTINUED | OUTPATIENT
Start: 2019-10-17 | End: 2019-10-18 | Stop reason: HOSPADM

## 2019-10-17 RX ADMIN — SODIUM CHLORIDE: 9 INJECTION, SOLUTION INTRAVENOUS at 20:32

## 2019-10-17 RX ADMIN — SODIUM CHLORIDE: 9 INJECTION, SOLUTION INTRAVENOUS at 12:00

## 2019-10-17 RX ADMIN — POTASSIUM CHLORIDE 40 MEQ: 1500 TABLET, EXTENDED RELEASE ORAL at 21:33

## 2019-10-17 RX ADMIN — SODIUM CHLORIDE 1000 ML: 9 INJECTION, SOLUTION INTRAVENOUS at 15:12

## 2019-10-17 RX ADMIN — ACETAMINOPHEN 1000 MG: 500 TABLET, FILM COATED ORAL at 21:32

## 2019-10-17 RX ADMIN — ROSUVASTATIN CALCIUM 40 MG: 20 TABLET, FILM COATED ORAL at 21:33

## 2019-10-17 RX ADMIN — POTASSIUM CHLORIDE 40 MEQ: 1500 TABLET, EXTENDED RELEASE ORAL at 15:12

## 2019-10-17 RX ADMIN — Medication 10 MEQ: at 15:28

## 2019-10-17 RX ADMIN — METOPROLOL TARTRATE 25 MG: 25 TABLET, FILM COATED ORAL at 20:34

## 2019-10-17 RX ADMIN — ONDANSETRON HYDROCHLORIDE 4 MG: 2 INJECTION, SOLUTION INTRAMUSCULAR; INTRAVENOUS at 15:12

## 2019-10-17 ASSESSMENT — ENCOUNTER SYMPTOMS
ABDOMINAL DISTENTION: 0
CHILLS: 0
FREQUENCY: 0
VOMITING: 0
CONSTIPATION: 0
APPETITE CHANGE: 1
DIARRHEA: 0
BLOOD IN STOOL: 0
ABDOMINAL PAIN: 1
DIFFICULTY URINATING: 0
TREMORS: 1
FEVER: 0
DYSURIA: 0
NAUSEA: 1
HEMATURIA: 0

## 2019-10-17 ASSESSMENT — MIFFLIN-ST. JEOR
SCORE: 1668.48

## 2019-10-17 NOTE — ED TRIAGE NOTES
Decreased appetite x 3 weeks, abdominal pain.  Was at clinic today, Potassium 2.5.  Patient arrives in ED with IV in right AC.  ABCDs intact.

## 2019-10-17 NOTE — ED NOTES
[facility]  ED Nurse Handoff Report    Calista Atkinson is a 69 year old female   ED Chief complaint: low potassium, abdominal pain, decreased appetite 3 weeks  . ED Diagnosis:   Final diagnoses:   Hypokalemia     Allergies: No Known Allergies    Code Status: Full Code  Activity level - Baseline/Home:  Independent. Activity Level - Current:   Assist X 1. Lift room needed: No. Bariatric: No   Needed: No   Isolation: No. Infection: Not Applicable.     Vital Signs:   Vitals:    10/17/19 1450 10/17/19 1515 10/17/19 1545 10/17/19 1600   BP:  110/54 93/65 (!) 114/39   Pulse:  61 62 62   Resp: 22 18     Temp:       TempSrc:       SpO2: 96% 94%     Weight:       Height:           Cardiac Rhythm:  ,      Pain level: 0-10 Pain Scale: 5  Patient confused: No. Patient Falls Risk: Yes.   Elimination Status: Pt has not yet voided in ED   Patient Report - Initial Complaint: low potassium, abdominal pain. Focused Assessment: Pt states that over the past 3 weeks she has been having abdominal pain and decreased appetite. Pt states that even water will upset her stomach. Pt also endorses having weakness. Pt went to clinic where they performed blood work and found her potassium level to be low. Pt was then sent over to ER for further eval. Pt has been sinus [place] in the ER with HR occasionally in the 50's. Pt denies any dizziness or chest pain.    Tests Performed: lab work, CT scan. Abnormal Results:   Abnormal Labs Reviewed   BASIC METABOLIC PANEL - Abnormal; Notable for the following components:       Result Value    Potassium 2.3 (*)     Chloride 91 (*)     Carbon Dioxide 38 (*)     Creatinine 1.41 (*)     GFR Estimate 38 (*)     GFR Estimate If Black 44 (*)     All other components within normal limits      Treatments provided: NS bolus, oral potassium, potassium infusion,   Family Comments: Pt here by herself  OBS brochure/video discussed/provided to patient:  N/A  ED Medications:   Medications   potassium  chloride 10 mEq in 100 mL intermittent infusion with 10 mg lidocaine (10 mEq Intravenous New Bag 10/17/19 1528)   potassium chloride ER (K-DUR/KLOR-CON M) CR tablet 40 mEq (40 mEq Oral Given 10/17/19 1512)   ondansetron (ZOFRAN) injection 4 mg (4 mg Intravenous Given 10/17/19 1512)   0.9% sodium chloride BOLUS (1,000 mLs Intravenous New Bag 10/17/19 1512)   iohexol (OMNIPAQUE) solution 25 mL (25 mLs Oral Given 10/17/19 1548)     Drips infusing:  Yes  For the majority of the shift, the patient's behavior Green. Interventions performed were n/a.     Severe Sepsis OR Septic Shock Diagnosis Present: No      ED Nurse Name/Phone Number: Karishma Samuels RN,   4:13 PM    RECEIVING UNIT ED HANDOFF REVIEW    Above ED Nurse Handoff Report was reviewed: Yes  Reviewed by: Karen M. Lesch, RN on October 17, 2019 at 5:45 PM

## 2019-10-17 NOTE — PROGRESS NOTES
"Patient presents with:  Abdominal Pain: upper abdomen x3 weeks unable to eat    SUBJECTIVE  HPI:  Calista Atkinson is a 69 year old female who was referred to [facility] by Dr. Nick today for a 3 week history of epigastric pain and anorexia.  She has also lost 20 pounds in the past 3 weeks.      Denies any vomiting.   Last BM was 2 days ago and was normal per patient.  Although she states that she has only had 3 BM's in the past few weeks as she is not eating much.      She also states that she has had an intermittently productive cough and runny nose.  No fevers.    She is a smoker with a h/o COPD    Denies any chest pain, but does intermittently have shortness of breath      Denies any fevers.      Weight at last office visit in   on 8/27/19 was 265 pounds with /80,pulse ox 95%.    Weight was also entered as 265 pounds 10/1/19 cardiology visit.    Weight in clinic today is 246 revealing that she has lost 19 pounds.        Radiation of pain NO  Quality of pain is dull.  Worse with eating.      She is not currently taking Lasix, has not in the past 2 years, it is prn leg swelling only.  She is taking her metoprolol and Prinzide.     Note from Dr. Nick today:  Symptoms started with food not tasting well, now she hardly eats. She cannot eat anything because it upsets her stomach. Drinking 7 Up will settle her stomach. If she forces herself to eat something her stomach will start \"acting up\" as if she is going to vomit, but nothing comes up. \"Stomach just hurts all over\". She denies any fevers, chills, or vomiting. She is only taking sips of water at a time because if she drinks a full cup of water her stomach will start acting up again. She is not always having normal or frequent urine output. In the past 3 weeks she had a BM 3 times. She has a hx of heartburn but states this is controlled, she is taking omeprazole daily.       Past Medical History:   Diagnosis Date     CAD (coronary artery disease) 6/2012    STEMI " and stent      Carotid artery plaque 1/9/2015     Cellulitis      COPD (chronic obstructive pulmonary disease) (H)      CVA (cerebral infarction) 2012     Gastro-oesophageal reflux disease      Hyperlipidemia      Hypertension      Myocardial infarction (H) 2012     S/P carotid endarterectomy 1/9/2015     Current Outpatient Medications   Medication Sig Dispense Refill     Acetaminophen (TYLENOL PO)        albuterol (2.5 MG/3ML) 0.083% neb solution Take 1 vial (2.5 mg) by nebulization every 6 hours as needed for shortness of breath / dyspnea or wheezing 360 mL 0     albuterol (PROAIR HFA/PROVENTIL HFA/VENTOLIN HFA) 108 (90 BASE) MCG/ACT Inhaler Inhale 2 puffs into the lungs every 6 hours as needed for shortness of breath / dyspnea or wheezing 1 Inhaler 3     aspirin 81 MG tablet Take 81 mg by mouth daily       IBUPROFEN IB OR Take 1,000 mg by mouth daily as needed       lisinopril-hydrochlorothiazide (PRINZIDE/ZESTORETIC) 20-12.5 MG tablet Take 1 tablet by mouth daily 90 tablet 1     metoprolol tartrate (LOPRESSOR) 25 MG tablet Take 1 tablet (25 mg) by mouth 2 times daily 180 tablet 1     omeprazole (PRILOSEC) 40 MG capsule Take 1 capsule (40 mg) by mouth daily 90 capsule 3     rosuvastatin (CRESTOR) 40 MG tablet Take 1 tablet (40 mg) by mouth daily 90 tablet 3     furosemide (LASIX) 40 MG tablet Take 1 tablet (40 mg) by mouth daily Some days patient takes (2) tabs. (Patient not taking: Reported on 8/27/2019) 30 tablet 1     Ipratropium-Albuterol (COMBIVENT RESPIMAT)  MCG/ACT inhaler Inhale 1 puff into the lungs every 4 hours as needed for shortness of breath / dyspnea or wheezing Not to exceed 6 doses per day. (Patient not taking: Reported on 10/17/2019) 3 Inhaler 3     Social History     Tobacco Use     Smoking status: Current Every Day Smoker     Packs/day: 0.50     Types: Cigarettes     Smokeless tobacco: Never Used     Tobacco comment: 4-5 cig daily.    Substance Use Topics     Alcohol use: No      "Alcohol/week: 0.0 standard drinks       ROS:  CONSTITUTIONAL:NEGATIVE for fever, chills, change in weight  INTEGUMENTARY/SKIN: NEGATIVE for worrisome rashes, moles or lesions  EYES: NEGATIVE for vision changes or irritation  ENT/MOUTH: as per HPI  RESP:as per HPI  CV: NEGATIVE for chest pain, palpitations or peripheral edema  GI: NEGATIVE for nausea, abdominal pain, heartburn, or change in bowel habits  : negative  MUSCULOSKELETAL: as per HPI  NEURO: NEGATIVE for weakness, dizziness or paresthesias  Review of systems negative except as stated above.    OBJECTIVE:  /59   Pulse 74   Temp 96.8  F (36  C) (Tympanic)   Resp 26   Ht 1.702 m (5' 7\")   Wt 111.6 kg (246 lb)   SpO2 96%   BMI 38.53 kg/m    GENERAL APPEARANCE: healthy, alert and no distress  EYES: EOMI,  PERRL, conjunctiva clear  HENT: ear canals and TM's normal.  Nose and mouth without ulcers, erythema or lesions  NECK: supple, nontender, no lymphadenopathy  RESP: lungs clear to auscultation - no rales, rhonchi or wheezes  CV: regular rates and rhythm, normal S1 S2, no murmur noted  ABDOMEN: soft, hyperactive bowel sounds tenderness in epigastric region without rebound  NEURO: Normal strength and tone, sensory exam grossly normal,  normal speech and mentation  SKIN: no suspicious lesions or rashes    IV with normal saline was started while blood work was pending.  Received call with critical lab potassium of 2.5.    IV was then saline locked (200ml of NS was given) and consulted with charge nurse at [facility] ED.         Results for orders placed or performed in visit on 10/17/19   CBC with platelets differential   Result Value Ref Range    WBC 9.7 4.0 - 11.0 10e9/L    RBC Count 5.40 (H) 3.8 - 5.2 10e12/L    Hemoglobin 16.0 (H) 11.7 - 15.7 g/dL    Hematocrit 47.5 (H) 35.0 - 47.0 %    MCV 88 78 - 100 fl    MCH 29.6 26.5 - 33.0 pg    MCHC 33.7 31.5 - 36.5 g/dL    RDW 12.7 10.0 - 15.0 %    Platelet Count 178 150 - 450 10e9/L    Diff Method " Automated Method     % Neutrophils 72.5 %    % Lymphocytes 17.9 %    % Monocytes 7.8 %    % Eosinophils 1.0 %    % Basophils 0.4 %    % Immature Granulocytes 0.4 %    Nucleated RBCs 0 0 /100    Absolute Neutrophil 7.0 1.6 - 8.3 10e9/L    Absolute Lymphocytes 1.7 0.8 - 5.3 10e9/L    Absolute Monocytes 0.8 0.0 - 1.3 10e9/L    Absolute Eosinophils 0.1 0.0 - 0.7 10e9/L    Absolute Basophils 0.0 0.0 - 0.2 10e9/L    Abs Immature Granulocytes 0.0 0 - 0.4 10e9/L    Absolute Nucleated RBC 0.0    Comprehensive metabolic panel   Result Value Ref Range    Sodium 134 133 - 144 mmol/L    Potassium 2.5 (LL) 3.4 - 5.3 mmol/L    Chloride 92 (L) 94 - 109 mmol/L    Carbon Dioxide 35 (H) 20 - 32 mmol/L    Anion Gap 7 3 - 14 mmol/L    Glucose 108 (H) 70 - 99 mg/dL    Urea Nitrogen 23 7 - 30 mg/dL    Creatinine 1.33 (H) 0.52 - 1.04 mg/dL    GFR Estimate 40 (L) >60 mL/min/[1.73_m2]    GFR Estimate If Black 47 (L) >60 mL/min/[1.73_m2]    Calcium 9.5 8.5 - 10.1 mg/dL    Bilirubin Total 1.1 0.2 - 1.3 mg/dL    Albumin 3.7 3.4 - 5.0 g/dL    Protein Total 7.4 6.8 - 8.8 g/dL    Alkaline Phosphatase 78 40 - 150 U/L    ALT 10 0 - 50 U/L    AST 19 0 - 45 U/L   Lipase   Result Value Ref Range    Lipase 113 73 - 393 U/L   Amylase   Result Value Ref Range    Amylase 36 30 - 110 U/L         (E87.6) Hypokalemia  (primary encounter diagnosis)  Comment: unable to correct critical potassium level at [facility]  Plan: EKG 12-lead complete w/read - Clinics        Patient to be brought to ED by MA as vitals are otherwise stable.      (R10.13) Abdominal pain, epigastric  Comment:   Plan: CBC with platelets differential, Comprehensive         metabolic panel, Lipase, Amylase          (E86.0) Dehydration  Comment:   Plan: sodium chloride (PF) 0.9% PF flush 3 mL, sodium        chloride 0.9% infusion          (R63.4) Weight loss  Comment: 19 pounds since 10/1/19.      TO ED now for further evaluation and treatment, via wheelchair by our staff, with IV saline locked in  place on left arm.      Patient expresses understanding and agreement with the assessment and plan as above.

## 2019-10-17 NOTE — Clinical Note
Ian Nick, thank you for your referral of Ms. Atkinson today.  Unfortunately her potassium is at a critical level and she has been referred on to the ED.

## 2019-10-17 NOTE — PLAN OF CARE
ROOM # 212 2    Living Situation  Lives independently in the community in an apartment  Facility name:  [facility]  :   Breana Isaac   [phone]    Activity level at baseline: Indep  Activity level on admit: Indep      Patient registered to observation; given Patient Bill of Rights; given the opportunity to ask questions about observation status and their plan of care.  Patient has been oriented to the observation room, bathroom and call light is in place.    Discussed discharge goals and expectations with patient/family.

## 2019-10-17 NOTE — PATIENT INSTRUCTIONS
(E87.6) Hypokalemia  (primary encounter diagnosis)  Comment: unable to correct critical potassium level at [facility]  Plan: EKG 12-lead complete w/read - Clinics        Patient to be brought to ED by MA as vitals are otherwise stable.      (R10.13) Abdominal pain, epigastric  Comment:   Plan: CBC with platelets differential, Comprehensive         metabolic panel, Lipase, Amylase          (E86.0) Dehydration  Comment:   Plan: sodium chloride (PF) 0.9% PF flush 3 mL, sodium        chloride 0.9% infusion          (R63.4) Weight loss  Comment: 19 pounds since 10/1/19.      TO ED now for further evaluation and treatment.      Patient expresses understanding and agreement with the assessment and plan as above.

## 2019-10-17 NOTE — ED PROVIDER NOTES
"  History     Chief Complaint:  Low Potassium, Abdominal Pain, Decreased Appetite 3 weeks    The history is provided by the patient.      Calista Atkinson is a 69 year old female with a history of coronary artery disease, chronic obstructive pulmonary disease, cerebrovascular accident, hyperlipidemia, hypertension, and a myocardial infarction, who presents to the emergency department for evaluation of low potassium and abdominal pain. For the last three weeks, the patient reports abdominal pain, that does not radiate anywhere, with a decrease in appetite, as \"nothing tastes good.\" She also has developed nausea and tremors. She is unable to drink water, as it upsets her upper abdomen, and has only been sipping on a small can of 7 up everyday. Her persisting abdominal pain and lack of appetite prompted the patient to seek evaluation in the clinic. They found her to have a potassium of 2.5 and sent her over to the emergency department for further evaluation.     Patient denies vomiting, diarrhea or other stool changes, any urinary symptoms, new abdominal distention, leg swelling, fever, or chills. Patient denies a history of stomach ulcers or gastritis.     Lab Work from Clinic on 10/17/19:  CBC: WBC: 9.7, HGB: 16.0 (H), PLT: 178  CMP: Glucose 108 (H), Potassium: 2.5 (LL), Chloride: 92 (L), Carbon Dioxide: 35 (H), Creatinine: 1.33 (H), GFR Estimate: 40 (L), o/w WNL     Lipase: 113  Amylase: 36    Allergies:  No Known Drug Allergies     Medications:    Aspirin  Albuterol  Prilosec  Lopressor  Lisinopril  Crestor    Past Medical History:    Morbid obesity  GERD  Hypertension  CAD  Hyperlipidemia  COPD  CVA  Carotid artery plaque  MI    Past Surgical History:    Thrombectomy and stent RCA  Endarterectomy carotid  Heart CATH (left heart)  Tonsillectomy     Family History:    MI: Mother  Hypertension  Prostate cancer  Alcohol/drug  Hyperlipidemia    Social History:  Positive for tobacco use: 5 cigarettes a day  Negative for " alcohol use.  Negative for drug use.  Marital Status:        Review of Systems   Constitutional: Positive for appetite change. Negative for chills and fever.   Cardiovascular: Negative for leg swelling.   Gastrointestinal: Positive for abdominal pain and nausea. Negative for abdominal distention, blood in stool, constipation, diarrhea and vomiting.   Genitourinary: Negative for difficulty urinating, dysuria, frequency, hematuria and urgency.   Neurological: Positive for tremors.   All other systems reviewed and are negative.      Physical Exam     Patient Vitals for the past 24 hrs:   BP Temp Temp src Pulse Heart Rate Resp SpO2 Height Weight   10/17/19 1812 139/53 97.2  F (36.2  C) Oral 79 -- 16 90 % -- --   10/17/19 1720 -- -- -- -- 61 12 96 % -- --   10/17/19 1715 100/65 -- -- 66 65 -- -- -- --   10/17/19 1605 -- -- -- -- 58 -- -- -- --   10/17/19 1600 (!) 114/39 -- -- 62 70 -- -- -- --   10/17/19 1545 93/65 -- -- 62 59 -- -- -- --   10/17/19 1515 110/54 -- -- 61 63 18 94 % -- --   10/17/19 1450 -- -- -- -- 69 22 96 % -- --   10/17/19 1449 -- -- -- -- 61 8 97 % -- --   10/17/19 1448 -- -- -- -- 62 27 95 % -- --   10/17/19 1447 -- -- -- -- 66 12 93 % -- --   10/17/19 1446 -- -- -- -- 66 12 95 % -- --   10/17/19 1445 91/70 -- -- 67 62 24 95 % -- --   10/17/19 1444 -- -- -- -- 65 26 94 % -- --   10/17/19 1443 -- -- -- -- 61 11 97 % -- --   10/17/19 1442 -- -- -- -- 61 8 93 % -- --   10/17/19 1441 -- -- -- -- 63 8 92 % -- --   10/17/19 1440 -- -- -- -- 63 9 92 % -- --   10/17/19 1439 -- -- -- -- 64 10 93 % -- --   10/17/19 1438 -- -- -- -- 63 10 95 % -- --   10/17/19 1437 -- -- -- -- 63 11 93 % -- --   10/17/19 1436 -- -- -- -- 60 10 94 % -- --   10/17/19 1435 -- -- -- -- 58 29 95 % -- --   10/17/19 1434 -- -- -- -- 63 25 96 % -- --   10/17/19 1433 -- -- -- -- 65 28 96 % -- --   10/17/19 1432 -- -- -- -- -- (!) 31 97 % -- --   10/17/19 1431 91/60 -- -- 65 -- -- -- -- --   10/17/19 1307 110/61 97.3  F (36.3  C)  "Oral 68 -- 18 98 % 1.702 m (5' 7\") 111.6 kg (246 lb)     Physical Exam  General: Elderly female sitting upright  Eyes: PERRL, Conjunctive within normal limits. No scleral icterus.  ENT: Moist mucous membranes, oropharynx clear.   CV: Normal S1S2, no murmur, rub or gallop. Regular rate and rhythm  Resp: Clear to auscultation bilaterally, no wheezes, rales or rhonchi. Normal respiratory effort.  GI: Abdomen is soft and tender in the upper abdomen diffusely.  No palpable masses. No rebound or guarding.  MSK: Nontender. Normal active range of motion. Ambulatory.  Skin: Warm and dry. No rashes or lesions or ecchymoses on visible skin.  Neuro: Alert and oriented. Responds appropriately to all questions and commands. No focal findings appreciated. Normal muscle tone.  Psych: Normal mood and affect. Pleasant.      Emergency Department Course   ECG:  Indication: Low Potassium  Time: 1429  Vent. Rate 64 bpm. PA interval 150. QRS duration 98. QT/QTc 454/468. P-R-T axis 81 18 20. Normal sinus rhythm. Possible inferior infarct, age undetermined. Read time: 1441    Imaging:  CT Abdomen/Pelvis with Oral contrast:   1. The uterus is markedly enlarged, possibly related to the presence of fibroids. Pelvic ultrasound and/or MRI may be helpful for further characterization.  2. A left ovarian cystic lesion is indeterminate, and measures 6.6 cm. Ovarian malignancy is not excluded.  3. Colonic diverticulosis, without convincing evidence for diverticulitis. As per radiology.    Laboratory:  BMP: Potassium: 2.3 (LL), Chloride: 91 (L), Carbon Dioxide: 38 (H), Creatinine: 1.41 (H), GFR Estimate: 38 (L), o/w WNL     Interventions:  1512 NS 1L IV  1512 Zofran 4 mg IV  1512 Klor-Con 40 mEq PO  1528 KCl with Lidocaine 10 mEq IV    Emergency Department Course:  Nursing notes and vitals reviewed. 1445 I performed an exam of the patient as documented above.     IV inserted. Medicine administered as documented above. Blood drawn. This was sent to the " lab for further testing, results above.    The patient was sent for an abdomen/pelvis CT while in the emergency department, findings above.     EKG obtained in the ED, see results above.     1715 I rechecked the patient and discussed the results of her workup thus far. She denies any new concerns.     1722  I consulted with Dr. Fofana of the hospitalist services. They are in agreement to accept the patient for admission.    Findings and plan explained to the Patient who consents to admission. Discussed the patient with Dr. Fofana, who will admit the patient to an observation bed for further monitoring, evaluation, and treatment.    Impression & Plan    Medical Decision Making:  Calista Atkinson is a 69 year old female who presents to the emergency department with three weeks of nausea, upper abdominal discomfort, and decreased solid and liquid intake. Her symptoms seemed to start with nausea and poor intake. Multiple etiologies were considered, aside from her abdominal pain, including obstruction, gastritis, pancreatitis, paleo colic cholecystitis, etc. CT was obtained due to body habits and to exclude bowel obstruction. This showed an incidental enlarged uterus concerning for fibroids vs. Malignancy. She was substantially hypokalemic and was replenished through the IV and by mouth. I suspect some of this may be related to under nourishment. She has also some mild renal insufficiency, probably secondary to poor intake. She is also on multiple medications that can cause these issues. She will be admitted for further care with ongoing potassium supplementation and further GYN work up. All questions answered prior to admission.     Diagnosis:    ICD-10-CM    1. Hypokalemia E87.6    2. Uterine enlargement N85.2        Disposition:  Admitted to Dr. Fofana    Scribe Disclosure:  Celina MARIN, am serving as a scribe on 10/17/2019 at 2:50 PM to personally document services performed by Ling Lawler MD based on  my observations and the provider's statements to me.     Celina Hodges  10/17/2019   [facility] EMERGENCY DEPARTMENT       Ling Lawler MD  10/17/19 9023

## 2019-10-17 NOTE — PHARMACY-ADMISSION MEDICATION HISTORY
Admission medication history interview status for this patient is complete. See [facility] admission navigator for allergy information, prior to admission medications and immunization status.     Medication history interview source(s):Patient  Medication history resources (including written lists, pill bottles, clinic record):EPIC    Changes made to PTA medication list:  Added: none  Deleted: Furosemide, Ibuprofen prn, Combivent prn  Changed: Tylenol    Medication reconciliation/reorder completed by provider prior to medication history?  No     For patients on insulin therapy: No      Prior to Admission medications    Medication Sig Last Dose Taking? Auth Provider   acetaminophen (TYLENOL) 500 MG tablet Take 1,000 mg by mouth At Bedtime 10/16/2019 at Unknown time Yes Unknown, Entered By History   albuterol (2.5 MG/3ML) 0.083% neb solution Take 1 vial (2.5 mg) by nebulization every 6 hours as needed for shortness of breath / dyspnea or wheezing  Yes Twyla Nick MD   albuterol (PROAIR HFA/PROVENTIL HFA/VENTOLIN HFA) 108 (90 BASE) MCG/ACT Inhaler Inhale 2 puffs into the lungs every 6 hours as needed for shortness of breath / dyspnea or wheezing  Yes Twyla Nick MD   aspirin 81 MG EC tablet Take 81 mg by mouth daily 10/17/2019 at Unknown time Yes Unknown, Entered By History   lisinopril-hydrochlorothiazide (PRINZIDE/ZESTORETIC) 20-12.5 MG tablet Take 1 tablet by mouth daily 10/17/2019 at am Yes Anny Peterson APRN CNP   metoprolol tartrate (LOPRESSOR) 25 MG tablet Take 1 tablet (25 mg) by mouth 2 times daily 10/17/2019 at am Yes Anny Pteerson APRN CNP   omeprazole (PRILOSEC) 40 MG capsule Take 1 capsule (40 mg) by mouth daily 10/17/2019 at Unknown time Yes Twyla Nick MD   rosuvastatin (CRESTOR) 40 MG tablet Take 1 tablet (40 mg) by mouth daily 10/16/2019 at hs Yes Twyla Nick MD

## 2019-10-17 NOTE — PROGRESS NOTES
"Subjective     Calista Atkinson is a 69 year old female who presents to clinic today for the following health issues:    HPI   Acute Illness   Acute illness concerns: Upset stomach and weak  Onset: x 3 weeks    Fever: no     Chills/Sweats: no     Headache (location?): YES- frontal    Sinus Pressure:no    Conjunctivitis:  no    Ear Pain: no    Rhinorrhea: YES- little bit    Congestion: no     Sore Throat: no      Cough: YES-non-productive    Wheeze: YES    Decreased Appetite: YES    Nausea: YES    Vomiting: no     Diarrhea:  no     Dysuria/Freq.: no     Fatigue/Achiness: YES    Sick/Strep Exposure: no      Therapies Tried and outcome: cough syrup, \"lets me sleep at night\". Patient states that nothing tastes right, and when she eats it doesn't sit right in her stomach.    Symptoms started with food not tasting well, now she hardly eats. She cannot eat anything because it upsets her stomach. Drinking 7 Up will settle her stomach. If she forces herself to eat something her stomach will start \"acting up\" as if she is going to vomit, but nothing comes up. \"Stomach just hurts all over\". She denies any fevers, chills, or vomiting. She is only taking sips of water at a time because if she drinks a full cup of water her stomach will start acting up again. She is not always having normal or frequent urine output. In the past 3 weeks she had a BM 3 times. She has a hx of heartburn but states this is controlled, she is taking omeprazole daily.     She also reports feeling tremendously weak and fatigued, feels light headed when she gets up. She is sleeping constantly and today had a difficult time showering because she felt too weak, she felt shaky and very whoozy. Notes that she is checking her weight at home and is losing ~2 lbs a day. Today with the clinic scale she has lost 19 lbs in 16 days.     Patient is not taking lasix because edema has been well controlled. She is only smoking 2 cigarettes a day, she has not been craving " cigarettes and she is too weak to go outside. Notes that her upper back has been hurting because she is not able to stand up straight. However, this is not new. She is SOB with exertion as well, but this is not new. She has a hx of COPD.     She also has a cold and reports a cough and rhinorrhea.     In the past 1.5 weeks, whenever she sleeps on her side her hand and arm will go numb. It will also occur if she sleeps on her other side. Her right hand went numb suddenly when she was on the computer. States that her hand and arm went numb in the past when she had a stroke so she is concerned.           Recent Labs   Lab Test 08/27/19  0855 04/18/19  0900 07/10/18  1034  02/08/17  0922   LDL 93  --  155*  --  83   HDL 47*  --  49*  --  54   TRIG 235*  --  274*  --  217*   ALT 13 13 18   < >  --    CR 0.64 0.65 0.59   < >  --    GFRESTIMATED >90 90 >90   < >  --    GFRESTBLACK >90 >90 >90   < >  --    POTASSIUM 4.2 4.0 4.5   < >  --    TSH 2.05  --  2.25  --   --     < > = values in this interval not displayed.      BP Readings from Last 3 Encounters:   10/17/19 126/64   10/01/19 124/78   08/27/19 110/80    Wt Readings from Last 3 Encounters:   10/17/19 111.6 kg (246 lb)   10/01/19 120.2 kg (265 lb)   08/27/19 120.2 kg (265 lb)         Reviewed and updated as needed this visit by Provider  Problems         Review of Systems   ROS COMP: Constitutional, HEENT, cardiovascular, pulmonary, gi and gu systems are negative, except as otherwise noted.    This document serves as a record of the services and decisions personally performed and made by Twyla Nick MD. It was created on her behalf by Donita Elliott, a trained medical scribe. The creation of this document is based on the provider's statements to the medical scribe.  Donita Elliott October 17, 2019 9:11 AM         Objective    /64 (BP Location: Right arm, Patient Position: Sitting, Cuff Size: Adult Large)   Pulse 61   Temp 97.6  F (36.4  C) (Oral)   Resp  "16   Ht 1.702 m (5' 7\")   Wt 111.6 kg (246 lb)   SpO2 94%   BMI 38.53 kg/m    Body mass index is 38.53 kg/m .     Physical Exam   GENERAL: healthy, alert and no distress. Became light headed with posture changes  EYES: Eyes grossly normal to inspection, PERRL and conjunctivae and sclerae normal  HENT: ear canals and TM's normal, nose and mouth without ulcers or lesions.   NECK: no adenopathy, no asymmetry, masses, or scars and thyroid normal to palpation  RESP: lungs clear to auscultation - no rales, rhonchi or wheezes  CV: regular rate and rhythm, normal S1 S2, no S3 or S4, no murmur, click or rub, no peripheral edema and peripheral pulses strong  ABDOMEN: soft, tenderness with guarding in upper abdomen, active bowel sounds, no hepatosplenomegaly, no masses.  MS: no gross musculoskeletal defects noted, no edema  SKIN: no suspicious lesions or rashes  NEURO: Normal strength and tone, mentation intact and speech normal  PSYCH: mentation appears normal, affect normal/bright    Diagnostic Test Results:  Labs reviewed in Epic  No results found for this or any previous visit (from the past 24 hour(s)).        Assessment & Plan     (R10.9) Acute abdominal pain  (primary encounter diagnosis)  Comment: Sent patient to [facility] for work up and fluids. Patient stated she was able to drive herself   Plan: canceled labs today in clinic  (E86.0) Dehydration  Comment: clinically stable, but needs IV fluids, work up    (R63.4) Weight loss  Comment:19 lbs weight loss in 16 days. Ate only a sand which yesterday, nothing since. Felt nauseated after drinking water today but okay to still drive             Tobacco Cessation:   reports that she has been smoking cigarettes. She has been smoking about 0.50 packs per day. She has never used smokeless tobacco.  Tobacco Cessation Action Plan: Information offered: Patient not interested at this time    BMI:   Estimated body mass index is 38.53 kg/m  as calculated from the following:    Height " "as of this encounter: 1.702 m (5' 7\").    Weight as of this encounter: 111.6 kg (246 lb).       Will discuss in future    The information in this document, created by the medical scribe for me, accurately reflects the services I personally performed and the decisions made by me. I have reviewed and approved this document for accuracy prior to leaving the patient care area.  October 17, 2019 9:47 AM    Twyla Nick MD  [facility]    "

## 2019-10-18 VITALS
WEIGHT: 246 LBS | OXYGEN SATURATION: 92 % | DIASTOLIC BLOOD PRESSURE: 58 MMHG | SYSTOLIC BLOOD PRESSURE: 111 MMHG | HEART RATE: 70 BPM | RESPIRATION RATE: 16 BRPM | BODY MASS INDEX: 38.61 KG/M2 | TEMPERATURE: 96.4 F | HEIGHT: 67 IN

## 2019-10-18 LAB
ANION GAP SERPL CALCULATED.3IONS-SCNC: 2 MMOL/L (ref 3–14)
BUN SERPL-MCNC: 20 MG/DL (ref 7–30)
CALCIUM SERPL-MCNC: 8.3 MG/DL (ref 8.5–10.1)
CHLORIDE SERPL-SCNC: 101 MMOL/L (ref 94–109)
CO2 SERPL-SCNC: 35 MMOL/L (ref 20–32)
CREAT SERPL-MCNC: 1.27 MG/DL (ref 0.52–1.04)
GFR SERPL CREATININE-BSD FRML MDRD: 43 ML/MIN/{1.73_M2}
GLUCOSE SERPL-MCNC: 86 MG/DL (ref 70–99)
INTERPRETATION ECG - MUSE: NORMAL
POTASSIUM SERPL-SCNC: 3.1 MMOL/L (ref 3.4–5.3)
POTASSIUM SERPL-SCNC: 3.5 MMOL/L (ref 3.4–5.3)
SODIUM SERPL-SCNC: 138 MMOL/L (ref 133–144)

## 2019-10-18 PROCEDURE — 36415 COLL VENOUS BLD VENIPUNCTURE: CPT | Performed by: INTERNAL MEDICINE

## 2019-10-18 PROCEDURE — 25000132 ZZH RX MED GY IP 250 OP 250 PS 637: Performed by: INTERNAL MEDICINE

## 2019-10-18 PROCEDURE — 84132 ASSAY OF SERUM POTASSIUM: CPT | Mod: 91 | Performed by: PHYSICIAN ASSISTANT

## 2019-10-18 PROCEDURE — 84132 ASSAY OF SERUM POTASSIUM: CPT | Performed by: INTERNAL MEDICINE

## 2019-10-18 PROCEDURE — 25800030 ZZH RX IP 258 OP 636: Performed by: INTERNAL MEDICINE

## 2019-10-18 PROCEDURE — 80048 BASIC METABOLIC PNL TOTAL CA: CPT | Performed by: INTERNAL MEDICINE

## 2019-10-18 PROCEDURE — G0378 HOSPITAL OBSERVATION PER HR: HCPCS

## 2019-10-18 PROCEDURE — 36415 COLL VENOUS BLD VENIPUNCTURE: CPT | Performed by: PHYSICIAN ASSISTANT

## 2019-10-18 PROCEDURE — 96361 HYDRATE IV INFUSION ADD-ON: CPT

## 2019-10-18 PROCEDURE — 99217 ZZC OBSERVATION CARE DISCHARGE: CPT | Performed by: PHYSICIAN ASSISTANT

## 2019-10-18 RX ORDER — ESOMEPRAZOLE MAGNESIUM 40 MG/1
40 CAPSULE, DELAYED RELEASE ORAL
Qty: 30 CAPSULE | Refills: 0 | Status: SHIPPED | OUTPATIENT
Start: 2019-10-18 | End: 2020-01-03

## 2019-10-18 RX ORDER — LISINOPRIL AND HYDROCHLOROTHIAZIDE 12.5; 2 MG/1; MG/1
1 TABLET ORAL DAILY
Qty: 90 TABLET | Refills: 1 | COMMUNITY
Start: 2019-10-18 | End: 2019-10-25

## 2019-10-18 RX ADMIN — OMEPRAZOLE 40 MG: 20 CAPSULE, DELAYED RELEASE ORAL at 09:54

## 2019-10-18 RX ADMIN — METOPROLOL TARTRATE 25 MG: 25 TABLET, FILM COATED ORAL at 09:54

## 2019-10-18 RX ADMIN — POTASSIUM CHLORIDE 40 MEQ: 1500 TABLET, EXTENDED RELEASE ORAL at 05:38

## 2019-10-18 RX ADMIN — ASPIRIN 81 MG: 81 TABLET ORAL at 09:52

## 2019-10-18 RX ADMIN — POTASSIUM CHLORIDE 20 MEQ: 1500 TABLET, EXTENDED RELEASE ORAL at 09:52

## 2019-10-18 RX ADMIN — SODIUM CHLORIDE: 9 INJECTION, SOLUTION INTRAVENOUS at 05:39

## 2019-10-18 RX ADMIN — POTASSIUM CHLORIDE 40 MEQ: 1500 TABLET, EXTENDED RELEASE ORAL at 00:03

## 2019-10-18 NOTE — PLAN OF CARE
PRIMARY DIAGNOSIS: HYPOKALEMIA   OUTPATIENT/OBSERVATION GOALS TO BE MET BEFORE DISCHARGE:  ADLs back to baseline: Yes    Activity and level of assistance: Up with standby assistance.    Pain status: Pain free.    Return to near baseline physical activity: Yes    Patient alert and oriented x4. Vitals are Temp: 95.4  F (35.2  C) Temp src: Oral BP: 120/57 Pulse: 70 Heart Rate: 69 Resp: 16 SpO2: 93 % RA. Denies pain. Potassium re-check scheduled for 0400. IV infusing. Up with standby assistance. Tolerating diet. Plan to monitor labs.     Discharge Planner Nurse   Safe discharge environment identified: Yes  Barriers to discharge: No       Entered by: Sonia Nunez 10/18/2019     Please review provider order for any additional goals.   Nurse to notify provider when observation goals have been met and patient is ready for discharge.

## 2019-10-18 NOTE — PLAN OF CARE
"PRIMARY DIAGNOSIS:  Hyponatremia, abdominal discomfort  OUTPATIENT/OBSERVATION GOALS TO BE MET BEFORE DISCHARGE:  1. ADLs back to baseline: Yes    2. Activity and level of assistance: Ambulating independently.    3. Pain status:  Currently Pain free.    4. Return to near baseline physical activity: Yes    Blood pressure (!) 127/39, pulse 70, temperature 95.6  F (35.3  C), temperature source Oral, resp. rate 18, height 1.702 m (5' 7\"), weight 111.6 kg (246 lb), SpO2 90 %, not currently breastfeeding.    VSS.  LS clear, marginal sats on room air.  Patient currently denies any pain or discomfort.  Maintenance IV of NS infusing at 100 mls/hour.  Repeat Potassium at 2015:  2.5  40 meqs KCL given, will repeat per order. Magnesium level pending.  Patient offered box lunch; eating only small quantities; stating she just doesn't have an appetitie.  Monitor per tele tech:  Sinus rhythm/sinus [place], HR 55- 77.  PLAN:  Continue to provide supportive cares.       Discharge Planner Nurse   Safe discharge environment identified:  Yes    Barriers to discharge: Yes, unless medically cleared.        Entered by: Karen M. Lesch 10/17/2019 10:25 PM     Please review provider order for any additional goals.   Nurse to notify provider when observation goals have been met and patient is ready for discharge.  "

## 2019-10-18 NOTE — PLAN OF CARE
PRIMARY DIAGNOSIS: HYPOKALEMIA   OUTPATIENT/OBSERVATION GOALS TO BE MET BEFORE DISCHARGE:  1. ADLs back to baseline: Yes    2. Activity and level of assistance: Up with standby assistance.    3. Pain status: Pain free.    4. Return to near baseline physical activity: Yes    Patient alert and oriented x4. Vitals are Temp: 95.4  F (35.2  C) Temp src: Oral BP: 120/57 Pulse: 70 Heart Rate: 69 Resp: 16 SpO2: 93 % RA. Denies pain. Potassium re-check scheduled for 0400 was 3.1. Protocol initiated. IV infusing. Up with standby assistance. Tolerating diet. Plan to monitor labs.     Discharge Planner Nurse   Safe discharge environment identified: Yes  Barriers to discharge: No       Entered by: Muriel Alexandra 10/18/2019     Please review provider order for any additional goals.   Nurse to notify provider when observation goals have been met and patient is ready for discharge.

## 2019-10-18 NOTE — H&P
[facility]    Hospitalist History and Physical    Name: Calista Atkinson    MRN: [id number]  YOB: 1950    Age: 69 year old  Date of Admission:  10/17/2019  Date of Service (when I saw the patient): 10/17/19    Assessment & Plan   Calista Atkinson is a 69 year old female past medical history significant for coronary artery disease, hypertension, hyperlipidemia, COPD, history of smoking presented to the emergency room with abdominal fullness discomfort nausea constipation early satiety and change in bowel pattern.  Patient in the emergency room showed hypokalemia and acute renal failure.  CT abdomen and pelvis showed enlarged uterus.  She is being admitted to observation unit for hydration and electrolyte replacement      Hypokalemia  --Secondary to nausea and poor oral intake  --replace per protocol  --Hold hydrochlorothiazide     RENETTA Baseline creatinine of 0.6.  Up to 1.4 on admission  --Prerenal secondary to dehydration secondary to poor oral intake  --IV fluids  --Reassess labs in a.m.  --Hold lisinopril and hydrochlorothiazide  --Avoid nephrotoxins    Poor oral intake  --Likely secondary to abdominal fullness and early satiety from enlarged uterus  --Change in bowel pattern  --Patient more constipated.    Enlarged uterus.  Associated with early satiety change in bowel pattern  --Further evaluation will consult gynecology    History of COPD  --Continue prior to admission nebs    History of coronary artery disease  --Continue aspirin, beta-blocker and statins  --Holding lisinopril renal failure    GERD  --Continue PPI    DVT Prophylaxis: Pneumatic Compression Devices  Code Status: DNR / DNI    Disposition: Expected discharge in 1-2 days once hypokalemia and renal functions improves.  Completed evaluation    Primary Care Physician   Twyla Nick    Chief Complaint   Abdominal fullness and discomfort 2 to 3 days  Poor appetite early satiety change in bowel pattern for 3  weeks    History is obtained from the patient    History of Present Illness   Calista Atkinson is a 69 year old female with past medical history significant for coronary artery disease, COPD, C VA, GERD, hyperlipidemia presented to the emergency room with increased weakness nausea poor oral intake and abdominal fullness and discomfort.  Patient has been feeling lethargic weak and increasingly tired for the last few days.  For last several weeks she has had altered bowel pattern with more constipation and bowel movement every 3 to 4 days.  At baseline she is pretty regular with daily bowel movements.  She also has associated nausea no vomiting.  No fever no chills but felt significant malaise weakness.  History of stroke and noted some more pronounced weakness involving the right arm.    The emergency room patient was found to be hypokalemic and had acute renal insufficiency.  CT abdomen pelvis showed enlarged uterus.  Patient is being admitted for observation for IV fluids and electrolyte replacement.    Review of all the other symptoms are negative.    Past Medical History    Past Medical History:   Diagnosis Date     CAD (coronary artery disease) 6/2012    STEMI and stent      Carotid artery plaque 1/9/2015     Cellulitis      COPD (chronic obstructive pulmonary disease) (H)      CVA (cerebral infarction) 2012     Gastro-oesophageal reflux disease      Hyperlipidemia      Hypertension      Myocardial infarction (H) 2012     S/P carotid endarterectomy 1/9/2015         Past Surgical History   Past Surgical History:   Procedure Laterality Date     ANGIOGRAM  6/6/12    thrombectomy and stent RCA     CARDIAC SURGERY       ENDARTERECTOMY CAROTID Left 3/27/12     HEART CATH LEFT HEART CATH  03/02/15    Evidence of old MI, Continued good results of the stent in mid RCA with mild disease elsewhere in the RCA, Disease in very small branch of the distal CFX.      TONSILLECTOMY      age 9       Prior to Admission Medications    Prior to Admission Medications   Prescriptions Last Dose Informant Patient Reported? Taking?   acetaminophen (TYLENOL) 500 MG tablet 10/16/2019 at Unknown time  Yes Yes   Sig: Take 1,000 mg by mouth At Bedtime   albuterol (2.5 MG/3ML) 0.083% neb solution   No Yes   Sig: Take 1 vial (2.5 mg) by nebulization every 6 hours as needed for shortness of breath / dyspnea or wheezing   albuterol (PROAIR HFA/PROVENTIL HFA/VENTOLIN HFA) 108 (90 BASE) MCG/ACT Inhaler   No Yes   Sig: Inhale 2 puffs into the lungs every 6 hours as needed for shortness of breath / dyspnea or wheezing   aspirin 81 MG EC tablet 10/17/2019 at Unknown time  Yes Yes   Sig: Take 81 mg by mouth daily   lisinopril-hydrochlorothiazide (PRINZIDE/ZESTORETIC) 20-12.5 MG tablet 10/17/2019 at am  No Yes   Sig: Take 1 tablet by mouth daily   metoprolol tartrate (LOPRESSOR) 25 MG tablet 10/17/2019 at am  No Yes   Sig: Take 1 tablet (25 mg) by mouth 2 times daily   omeprazole (PRILOSEC) 40 MG capsule 10/17/2019 at Unknown time  No Yes   Sig: Take 1 capsule (40 mg) by mouth daily   rosuvastatin (CRESTOR) 40 MG tablet 10/16/2019 at hs  No Yes   Sig: Take 1 tablet (40 mg) by mouth daily      Facility-Administered Medications: None     Allergies   No Known Allergies    Social History   Social History     Tobacco Use     Smoking status: Current Every Day Smoker     Packs/day: 0.50     Types: Cigarettes     Smokeless tobacco: Never Used     Tobacco comment: 4-5 cig daily.    Substance Use Topics     Alcohol use: No     Alcohol/week: 0.0 standard drinks     Social History     Patient does not qualify to have social determinant information on file (likely too young).   Social History Narrative     Not on file     Smokes about half a pack per day for more than 30 years.  Denies any use of alcohol.  Lives alone.    Family History   Dad had pancreatic cancer.    Review of Systems   A Comprehensive greater than 10 system review of systems was carried out.  Pertinent  positives and negatives are noted above.  Otherwise negative for contributory information.    Physical Exam   Temp: 97.2  F (36.2  C) Temp src: Oral BP: 139/53 Pulse: 79 Heart Rate: 61 Resp: 16 SpO2: 90 % O2 Device: None (Room air)    Vital Signs with Ranges  Temp:  [96.8  F (36  C)-97.6  F (36.4  C)] 97.2  F (36.2  C)  Pulse:  [61-79] 79  Heart Rate:  [58-70] 61  Resp:  [8-31] 16  BP: ()/(39-70) 139/53  SpO2:  [90 %-98 %] 90 %  246 lbs 0 oz    GEN:  Alert, oriented x 3, appears comfortable, no overt distress  HEENT:  Normocephalic/atraumatic, no scleral icterus, no nasal discharge, mouth dry  CV:  Regular rate and rhythm, no murmur or JVD.  S1 + S2 noted, no S3 or S4.  LUNGS:  Clear to auscultation bilaterally without rales/rhonchi/wheezing/retractions.  Symmetric chest rise on inhalation noted.  ABD:  Active bowel sounds, soft, non-tender/non-distended.  No rebound/guarding/rigidity.  EXT:  No edema.  No cyanosis.  No joint synovitis noted.  SKIN:  Dry to touch, no exanthems noted in the visualized areas.  NEURO:  Symmetric muscle strength,   No new focal deficits appreciated.    Data   Data reviewed today:  I personally reviewed the EKG tracing showing Q waves in lead III ST-T flat in inferior leads.    Recent Labs   Lab 10/17/19  1109   WBC 9.7   HGB 16.0*   HCT 47.5*   MCV 88        Recent Labs   Lab 10/17/19  1435 10/17/19  1109    134   POTASSIUM 2.3* 2.5*   CHLORIDE 91* 92*   CO2 38* 35*   ANIONGAP 6 7   GLC 97 108*   BUN 24 23   CR 1.41* 1.33*   GFRESTIMATED 38* 40*   GFRESTBLACK 44* 47*   KEVON 9.2 9.5     No results for input(s): CULT in the last 168 hours.  Recent Labs   Lab 10/17/19  1109   AST 19   ALT 10   ALKPHOS 78   BILITOTAL 1.1     No results for input(s): INR in the last 168 hours.  No results for input(s): LACT in the last 168 hours.  No results for input(s): TROPONIN, TROPI, TROPR in the last 168 hours.    Invalid input(s): TROP, TROPONINIES  No results for input(s): COLOR,  APPEARANCE, URINEGLC, URINEBILI, URINEKETONE, SG, UBLD, URINEPH, PROTEIN, UROBILINOGEN, NITRITE, LEUKEST, RBCU, WBCU in the last 168 hours.    Recent Results (from the past 24 hour(s))   CT Abdomen pelvis - oral contrast only    Narrative    CT ABDOMEN AND PELVIS WITHOUT CONTRAST   10/17/2019 4:21 PM     HISTORY: Upper abdominal pain.    TECHNIQUE: Volumetric helical sections were acquired from the lung  bases through the ischial tuberosities without IV contrast. Coronal  images were also reconstructed. Radiation dose for this scan was  reduced using automated exposure control, adjustment of the mA and/or  kV according to patient size, or iterative reconstruction technique.    COMPARISON: None.    FINDINGS: No bowel obstruction. Colonic diverticulosis, without  convincing evidence for diverticulitis. Unremarkable appendix. No free  fluid in the pelvis. A left ovarian cystic lesion measures 6.6 x 5.5  cm. The uterus is markedly enlarged, measuring approximately 17 x 16.6  x 13.2 cm. Moderate atherosclerotic aortoiliac calcification. The  liver, gallbladder, spleen, adrenal glands, pancreas, and kidneys have  unremarkable noncontrast appearances. No enlarged lymph nodes are  identified in the abdomen or pelvis. Coronary artery calcification.  The visualized lung bases are clear. No aggressive-appearing bone  lesions.      Impression    IMPRESSION:   1. The uterus is markedly enlarged, possibly related to the presence  of fibroids. Pelvic ultrasound and/or MRI may be helpful for further  characterization.  2. A left ovarian cystic lesion is indeterminate, and measures 6.6 cm.  Ovarian malignancy is not excluded.  3. Colonic diverticulosis, without convincing evidence for  diverticulitis.          CARLEEN LONG MD

## 2019-10-18 NOTE — CONSULTS
Gynecology Consult  [facility]    CC: enlarged uterus, ovarian cyst    S: Calista Atkinson is a 69 year old postmenopausal female with PMH CAD, CVA, MI, HTN, COPD, tobacco use currently HD#2 admitted to medicine service with dehydration, hypokalemia, and acute kidney injury. She reports three weeks of abdominal fullness, decreased appetite, early satiety, and constipation. She reports decreased PO intake secondary to this. After three weeks and no improvement in symptoms she presented to her PCP, who sent her to the ED for further evaluation. She denies any vaginal bleeding.    ED evaluation: Vitals within normal range. Wbc 9.7, hgb 16, plts 178, Na 134, K 2.5, Cr 1.1. CT A/P with 17 x 16.6 cm uterus, markedly enlarged. A left ovarian cyst was seen measuring 6.6 cm. No enlarged lymph nodes. Gynecology was thus consulted.     Today she reports feeling much improved after IV fluids and potassium replacement.     Gyn history:  Menarche age 12  Menopause in early 50's  Reports history of heavy periods. As far as she knows, no history of fibroids.   Reports normal pap smears and regular care.   Does not have a gynecologist    Ob history:    x3  C/s x1 for twins  Denies any pregnancy complications.     Past Medical History:   Diagnosis Date     CAD (coronary artery disease) 2012    STEMI and stent      Carotid artery plaque 2015     Cellulitis      COPD (chronic obstructive pulmonary disease) (H)      CVA (cerebral infarction)      Gastro-oesophageal reflux disease      Hyperlipidemia      Hypertension      Myocardial infarction (H)      S/P carotid endarterectomy 2015      Past Surgical History:   Procedure Laterality Date     ANGIOGRAM  12    thrombectomy and stent RCA     CARDIAC SURGERY       ENDARTERECTOMY CAROTID Left 3/27/12     HEART CATH LEFT HEART CATH  03/02/15    Evidence of old MI, Continued good results of the stent in mid RCA with mild disease elsewhere in the RCA, Disease  in very small branch of the distal CFX.      TONSILLECTOMY      age 9      Medications:  No current facility-administered medications on file prior to encounter.   acetaminophen (TYLENOL) 500 MG tablet, Take 1,000 mg by mouth At Bedtime  albuterol (2.5 MG/3ML) 0.083% neb solution, Take 1 vial (2.5 mg) by nebulization every 6 hours as needed for shortness of breath / dyspnea or wheezing  albuterol (PROAIR HFA/PROVENTIL HFA/VENTOLIN HFA) 108 (90 BASE) MCG/ACT Inhaler, Inhale 2 puffs into the lungs every 6 hours as needed for shortness of breath / dyspnea or wheezing  aspirin 81 MG EC tablet, Take 81 mg by mouth daily  lisinopril-hydrochlorothiazide (PRINZIDE/ZESTORETIC) 20-12.5 MG tablet, Take 1 tablet by mouth daily  metoprolol tartrate (LOPRESSOR) 25 MG tablet, Take 1 tablet (25 mg) by mouth 2 times daily  omeprazole (PRILOSEC) 40 MG capsule, Take 1 capsule (40 mg) by mouth daily  rosuvastatin (CRESTOR) 40 MG tablet, Take 1 tablet (40 mg) by mouth daily      Social History     Socioeconomic History     Marital status:      Spouse name: Not on file     Number of children: Not on file     Years of education: Not on file     Highest education level: Not on file   Occupational History     Not on file   Social Needs     Financial resource strain: Not on file     Food insecurity:     Worry: Not on file     Inability: Not on file     Transportation needs:     Medical: Not on file     Non-medical: Not on file   Tobacco Use     Smoking status: Current Every Day Smoker     Packs/day: 0.50     Types: Cigarettes     Smokeless tobacco: Never Used     Tobacco comment: 4-5 cig daily.    Substance and Sexual Activity     Alcohol use: No     Alcohol/week: 0.0 standard drinks     Drug use: No     Sexual activity: Not Currently   Lifestyle     Physical activity:     Days per week: Not on file     Minutes per session: Not on file     Stress: Not on file   Relationships     Social connections:     Talks on phone: Not on file      Gets together: Not on file     Attends Scientologist service: Not on file     Active member of club or organization: Not on file     Attends meetings of clubs or organizations: Not on file     Relationship status: Not on file     Intimate partner violence:     Fear of current or ex partner: Not on file     Emotionally abused: Not on file     Physically abused: Not on file     Forced sexual activity: Not on file   Other Topics Concern     Parent/sibling w/ CABG, MI or angioplasty before 65F 55M? Not Asked      Service Not Asked     Blood Transfusions Not Asked     Caffeine Concern Yes     Comment: 2-3 cups daily     Occupational Exposure Not Asked     Hobby Hazards Not Asked     Sleep Concern Yes     Comment: off and on sleeping     Stress Concern Not Asked     Weight Concern Not Asked     Special Diet No     Back Care Not Asked     Exercise No     Bike Helmet Not Asked     Seat Belt Not Asked     Self-Exams Not Asked   Social History Narrative     Not on file        O:  Vitals:    10/18/19 0001 10/18/19 0612 10/18/19 0726 10/18/19 1103   BP: 120/57 110/56 106/49 111/58   BP Location: Left arm Left arm Left arm Left arm   Pulse:       Resp: 16 18 16 16   Temp: 95.4  F (35.2  C) 97  F (36.1  C) 96.8  F (36  C) 96.4  F (35.8  C)   TempSrc: Oral Oral Oral Oral   SpO2: 93% 94% 93% 92%   Weight:       Height:         General: NAD, sitting in bed comfortably  Resp: breathing comfortably on room air  Abdomen: exam limited by obesity, mass palpable above umbilicus, mildly tender, no guarding  : deferred    Imaging/Labs:  Results for orders placed or performed during the hospital encounter of 10/17/19 (from the past 24 hour(s))   CT Abdomen pelvis - oral contrast only    Narrative    CT ABDOMEN AND PELVIS WITHOUT CONTRAST   10/17/2019 4:21 PM     HISTORY: Upper abdominal pain.    TECHNIQUE: Volumetric helical sections were acquired from the lung  bases through the ischial tuberosities without IV contrast.  Coronal  images were also reconstructed. Radiation dose for this scan was  reduced using automated exposure control, adjustment of the mA and/or  kV according to patient size, or iterative reconstruction technique.    COMPARISON: None.    FINDINGS: No bowel obstruction. Colonic diverticulosis, without  convincing evidence for diverticulitis. Unremarkable appendix. No free  fluid in the pelvis. A left ovarian cystic lesion measures 6.6 x 5.5  cm. The uterus is markedly enlarged, measuring approximately 17 x 16.6  x 13.2 cm. Moderate atherosclerotic aortoiliac calcification. The  liver, gallbladder, spleen, adrenal glands, pancreas, and kidneys have  unremarkable noncontrast appearances. No enlarged lymph nodes are  identified in the abdomen or pelvis. Coronary artery calcification.  The visualized lung bases are clear. No aggressive-appearing bone  lesions.      Impression    IMPRESSION:   1. The uterus is markedly enlarged, possibly related to the presence  of fibroids. Pelvic ultrasound and/or MRI may be helpful for further  characterization.  2. A left ovarian cystic lesion is indeterminate, and measures 6.6 cm.  Ovarian malignancy is not excluded.  3. Colonic diverticulosis, without convincing evidence for  diverticulitis.          CARLEEN LONG MD   Potassium   Result Value Ref Range    Potassium 2.5 (LL) 3.4 - 5.3 mmol/L   Magnesium   Result Value Ref Range    Magnesium 1.9 1.6 - 2.3 mg/dL   Basic metabolic panel   Result Value Ref Range    Sodium 138 133 - 144 mmol/L    Potassium 3.1 (L) 3.4 - 5.3 mmol/L    Chloride 101 94 - 109 mmol/L    Carbon Dioxide 35 (H) 20 - 32 mmol/L    Anion Gap 2 (L) 3 - 14 mmol/L    Glucose 86 70 - 99 mg/dL    Urea Nitrogen 20 7 - 30 mg/dL    Creatinine 1.27 (H) 0.52 - 1.04 mg/dL    GFR Estimate 43 (L) >60 mL/min/[1.73_m2]    GFR Estimate If Black 49 (L) >60 mL/min/[1.73_m2]    Calcium 8.3 (L) 8.5 - 10.1 mg/dL   Potassium   Result Value Ref Range    Potassium 3.5 3.4 - 5.3 mmol/L        A/P: 69 year old postmenopausal female HD#2 admitted to medicine service with dehydration, hypokalemia, RENETTA with 3 weeks of abdominal bloating/decreased appetite, found to have a 16 cm uterus and 6.6 cm left adnexal cyst/mass. Currently improved from a medicine stand point with plan to be discharged home shortly. No vaginal bleeding, reportedly normal pap history, and no known history of fibroids or enlarged uterus.  - Discussed differential with patient, including possible malignancy causing enlarged uterus/pelvic mass.   - Recommend close outpatient follow-up with [facility]. Will obtain additional imaging with pelvic ultrasound to further evaluate the uterus and ovaries. Will also likely obtain CA-125 at that time.   - Message sent to  to assist with contacting patient and arranging appointment in the next 1-2 weeks. Patient was also given the clinic phone number and instructed to call if she does not hear from us in the next 3 business days.     Thank you for this consultation.    MD [facility]  10/18/2019 4:03 PM

## 2019-10-18 NOTE — PLAN OF CARE
"PRIMARY DIAGNOSIS: HYPOKALEMIA   OUTPATIENT/OBSERVATION GOALS TO BE MET BEFORE DISCHARGE:  ADLs back to baseline: Yes     Activity and level of assistance: Up with standby assistance.     Pain status: Pain free.     Return to near baseline physical activity: Yes     Patient alert and oriented x4. Vitals are Temp: /49 (BP Location: Left arm)   Pulse 70   Temp 96.8  F (36  C) (Oral)   Resp 16   Ht 1.702 m (5' 7\")   Wt 111.6 kg (246 lb)   SpO2 93%   BMI 38.53 kg/m   Denies pain. Potassium was 3. replacement given. Protocol initiated. IV saline locked. Up independently. Tolerating diet. Plan to monitor labs and provide cares. OB gyn consult pending.      Discharge Planner Nurse   Safe discharge environment identified: Yes  Barriers to discharge: No       Entered by: Becka Collazo RN 10/18/2019  Please review provider order for any additional goals.   Nurse to notify provider when observation goals have been met and patient is ready for discharge.        "

## 2019-10-18 NOTE — PLAN OF CARE
"PRIMARY DIAGNOSIS: HYPOKALEMIA   OUTPATIENT/OBSERVATION GOALS TO BE MET BEFORE DISCHARGE:  ADLs back to baseline: Yes     Activity and level of assistance: Up with standby assistance.     Pain status: Pain free.     Return to near baseline physical activity: Yes     Patient alert and oriented x4. Vitals are Temp: /58 (BP Location: Left arm)   Pulse 70   Temp 96.4  F (35.8  C) (Oral)   Resp 16   Ht 1.702 m (5' 7\")   Wt 111.6 kg (246 lb)   SpO2 92%   BMI 38.53 kg/m  Denies pain. Potassium was 3.5 after replacement given. IV saline locked. Up independently. Tolerating diet. Patient will discharge before 1500. Plan to monitor labs and provide cares. OB gyn consult pending.      Discharge Planner Nurse   Safe discharge environment identified: Yes  Barriers to discharge: No       Entered by: Becka Collazo RN 10/18/2019  Please review provider order for any additional goals.   Nurse to notify provider when observation goals have been met and patient is ready for discharge.               "

## 2019-10-18 NOTE — DISCHARGE SUMMARY
Formerly [facility] Outpatient / Observation Unit  Discharge Summary        Calista Atkinson MRN# [id number]   YOB: 1950 Age: 69 year old     Date of Admission:  10/17/2019  Date of Discharge:  10/18/2019  Admitting Physician:  Nisha Fofana MD  Discharge Physician: Dorita Burr PA-C  Discharging Service: Hospitalist      Primary Provider: Twyla Nick  Primary Care Physician Phone Number: [phone]         Primary Discharge Diagnoses:    Calista Atkinson is a 69 year old female past medical history significant for coronary artery disease, hypertension, hyperlipidemia, COPD, history of smoking, who was admitted on 10/17/2019 for concerns of of abdominal fullness, nausea, change in bowel pattern and early satiety and found to have RENETTA and low potassium on initial work up.     1. Hypokalemia - due to poor PO intake for 3 weeks and Lisinopril/HCTZ use. Replaced per protocol, resolved. Lisinopril/HCTZ held during admission and on discharge. PO intake improved here, if remains good, follow up with PCP and may consider restarting it vs switching to a different antihypertensive.   2. RENETTA - baseline Cr within normal limits, 1.4 on admission, improved with 1.27 after IVFs. Likely due to dehydration and lisinopril/HCTZ use. Per above, Lisinopril/HCTZ held during admission and on discharge.  3. Poor PO intake - unclear etiology, possibly related to GERD but given weight loss and CT imaging findings of enlarged uterus and ovarian lesion, concern for underlying malignancy. PO intake did improved with IV hydration and correction of potassium. Tolerated oral intake prior to discharge. Discussed switching PPI as she has been on Prilosec for years, will switch to Nexium. Follow up with PCP in 1-2 weeks to assess sx and recheck labs  4. Enlarged uterus and ovarian lesion on CT - OB/Gyn consulted, cannot rule out malignancy on imaging. Gyn recommending further OP work up, appt set up for next week for consult and  US.  5. GERD - pt notes she been on Prilosec for years, now with abdominal bloating, early satiety, unlikely due to reflux but will trial switch in PPI. Prescription sent for Nexium.        Secondary Discharge Diagnoses:     Past Medical History:   Diagnosis Date     CAD (coronary artery disease) 6/2012    STEMI and stent      Carotid artery plaque 1/9/2015     Cellulitis      COPD (chronic obstructive pulmonary disease) (H)      CVA (cerebral infarction) 2012     Gastro-oesophageal reflux disease      Hyperlipidemia      Hypertension      Myocardial infarction (H) 2012     S/P carotid endarterectomy 1/9/2015                Code Status:      Full Code        Brief Hospital Summary:        Reason for your hospital stay      Epigastric abdominal pain, poor oral intake, low potassium and elevated   kidney function. Potassium replaced per protocol. Kidney function improved   with IV fluids but is not normal yet. Oral intake did improve. CT of the   abd/pelvis in the ED showed an enlarged uterus and an ovarian lesion.   OB/Gyn was consulted for further recommendations.             Please refer to initial admission history and physical for further details.   Briefly, Calista Atkinson was admitted on 10/17/2019 for concerns of abdominal fullness, nausea, change in bowel pattern and early satiety and found to have RENETTA and low potassium.  Pt was initially seen in clinic and then set to Acute and Diagnostic Services for lab work and IVFs. Work up there revealed a low potassium and RENETTA so the pt was referred to the ED for further work up. EKG showed sinus rhythm with non specific T wave abnormalities. CT of the abd/pelvis was obtained without contrast and showed an enlarged uterus and left ovarian lesion, malignancy cannot be ruled out, otherwise unremarkable for acute process. Pt was registered to the Observation Unit for further evaluation.   Pt was provided supportive cares. Potassium was replaced per protocol and she was  rehydrated with IVFs. Labs were reviewed and significant results addressed.   On the day of discharge, pt's appetite had improved. OB/Gyn was consulted and arranged for outpatient follow up and work up of imaging findings. Medications were reviewed and adjustments made as necessary. Pt is instructed to follow up as below.           Significant Lab During Hospitalization:      Recent Labs   Lab 10/17/19  1109   WBC 9.7   HGB 16.0*   HCT 47.5*   MCV 88        Recent Labs   Lab 10/18/19  1153 10/18/19  0422 10/17/19  2051 10/17/19  1435 10/17/19  1109   NA  --  138  --  135 134   POTASSIUM 3.5 3.1* 2.5* 2.3* 2.5*   CHLORIDE  --  101  --  91* 92*   CO2  --  35*  --  38* 35*   ANIONGAP  --  2*  --  6 7   GLC  --  86  --  97 108*   BUN  --  20  --  24 23   CR  --  1.27*  --  1.41* 1.33*   GFRESTIMATED  --  43*  --  38* 40*   GFRESTBLACK  --  49*  --  44* 47*   KEVON  --  8.3*  --  9.2 9.5   MAG  --   --  1.9  --   --    PROTTOTAL  --   --   --   --  7.4   ALBUMIN  --   --   --   --  3.7   BILITOTAL  --   --   --   --  1.1   ALKPHOS  --   --   --   --  78   AST  --   --   --   --  19   ALT  --   --   --   --  10     Recent Labs   Lab 10/17/19  1109   LIPASE 113                Significant Imaging During Hospitalization:      Recent Results (from the past 48 hour(s))   CT Abdomen pelvis - oral contrast only    Narrative    CT ABDOMEN AND PELVIS WITHOUT CONTRAST   10/17/2019 4:21 PM     HISTORY: Upper abdominal pain.    TECHNIQUE: Volumetric helical sections were acquired from the lung  bases through the ischial tuberosities without IV contrast. Coronal  images were also reconstructed. Radiation dose for this scan was  reduced using automated exposure control, adjustment of the mA and/or  kV according to patient size, or iterative reconstruction technique.    COMPARISON: None.    FINDINGS: No bowel obstruction. Colonic diverticulosis, without  convincing evidence for diverticulitis. Unremarkable appendix. No free  fluid  in the pelvis. A left ovarian cystic lesion measures 6.6 x 5.5  cm. The uterus is markedly enlarged, measuring approximately 17 x 16.6  x 13.2 cm. Moderate atherosclerotic aortoiliac calcification. The  liver, gallbladder, spleen, adrenal glands, pancreas, and kidneys have  unremarkable noncontrast appearances. No enlarged lymph nodes are  identified in the abdomen or pelvis. Coronary artery calcification.  The visualized lung bases are clear. No aggressive-appearing bone  lesions.      Impression    IMPRESSION:   1. The uterus is markedly enlarged, possibly related to the presence  of fibroids. Pelvic ultrasound and/or MRI may be helpful for further  characterization.  2. A left ovarian cystic lesion is indeterminate, and measures 6.6 cm.  Ovarian malignancy is not excluded.  3. Colonic diverticulosis, without convincing evidence for  diverticulitis.          CARLEEN LONG MD              Pending Results:        Unresulted Labs Ordered in the Past 30 Days of this Admission     No orders found for last 31 day(s).              Consultations This Hospital Stay:      Consultation during this admission received from gynecology         Discharge Instructions and Follow-Up:      Follow-up Appointments     Follow-up and recommended labs and tests       Follow up with primary care provider, Twyla Nick, within 7   days for hospital follow- up.  The following labs/tests are recommended:   BMP. Recheck kidney function and potassium. Hold Lisinopril/HCTZ until   follow up. Discuss with PCP whether these can be restarted vs switching to   an alternative antihypertensive.     Encourage oral hydration.  Follow up with OB/Gyn for further work up of CT findings of enlarged   uterus and ovarian lesion.           Pt instructed to follow up with PCP in 7 days and with Consultant if indicated.   Follow-up Labs BMP        Discharge Disposition:      Discharged to home         Discharge Medications:        Discharge  "Medication List as of 10/18/2019  1:30 PM      START taking these medications    Details   esomeprazole (NEXIUM) 40 MG DR capsule Take 1 capsule (40 mg) by mouth every morning (before breakfast) Take 30-60 minutes before eating., Disp-30 capsule, R-0, E-Prescribe         CONTINUE these medications which have CHANGED    Details   lisinopril-hydrochlorothiazide (PRINZIDE/ZESTORETIC) 20-12.5 MG tablet Take 1 tablet by mouth daily, Disp-90 tablet, R-1, HistoricalHold medication until follow up with PCP         CONTINUE these medications which have NOT CHANGED    Details   albuterol (2.5 MG/3ML) 0.083% neb solution Take 1 vial (2.5 mg) by nebulization every 6 hours as needed for shortness of breath / dyspnea or wheezing, Disp-360 mL, R-0, E-Prescribe      albuterol (PROAIR HFA/PROVENTIL HFA/VENTOLIN HFA) 108 (90 BASE) MCG/ACT Inhaler Inhale 2 puffs into the lungs every 6 hours as needed for shortness of breath / dyspnea or wheezing, Disp-1 Inhaler, R-3, E-Prescribe      metoprolol tartrate (LOPRESSOR) 25 MG tablet Take 1 tablet (25 mg) by mouth 2 times daily, Disp-180 tablet, R-1, E-Prescribe      rosuvastatin (CRESTOR) 40 MG tablet Take 1 tablet (40 mg) by mouth daily, Disp-90 tablet, R-3, E-Prescribe      acetaminophen (TYLENOL) 500 MG tablet Take 1,000 mg by mouth At Bedtime, Historical      aspirin 81 MG EC tablet Take 81 mg by mouth daily, Historical         STOP taking these medications       omeprazole (PRILOSEC) 40 MG capsule Comments:   Reason for Stopping:                   Allergies:       No Known Allergies        Condition and Physical on Discharge:      Discharge condition: Stable   Vitals: Blood pressure 111/58, pulse 70, temperature 96.4  F (35.8  C), temperature source Oral, resp. rate 16, height 1.702 m (5' 7\"), weight 111.6 kg (246 lb), SpO2 92 %, not currently breastfeeding.  246 lbs 0 oz      GENERAL:  Comfortable.  PSYCH: pleasant, oriented, No acute distress.  HEART:  RRR.  LUNGS: Normal " Respiratory effort.   EXTREMITIES:  Able to ambulate independently.  SKIN:  Dry to touch, No rash, wound or ulcerations.  NEUROLOGIC:  Grossly intact    Dorita Burr PA-C PA-C

## 2019-10-18 NOTE — PLAN OF CARE
Patient's After Visit Summary was reviewed with patient.   Patient verbalized understanding of After Visit Summary, recommended follow up and was given an opportunity to ask questions.   Discharge medications sent home with patient/family: YES, sent to pharmacy  Discharged with transport tech.    OBSERVATION patient END time: 1:51 PM

## 2019-10-21 ENCOUNTER — TELEPHONE (OUTPATIENT)
Dept: FAMILY MEDICINE | Facility: CLINIC | Age: 69
End: 2019-10-21

## 2019-10-21 NOTE — TELEPHONE ENCOUNTER
10/21/2019    Patient called searching for an ER f/u appt from 10/17/2019. Patient was instructed to get in an appt sometime this week.  first available isn't until 11/07/2019. Patient would like to be seen this week and specifically with [facility].     I did see a SameDay spot on 10/24/2019 at 2:20 p.m but wanted to see if that could be approved. Patient would like to see if you can find a spot to slide her onto the schedule? Please call patient to arrange if possible. Patient ph:[phone]      Narda Hunt -Patient Representative

## 2019-10-21 NOTE — TELEPHONE ENCOUNTER
[facility] ER for Hypokalemia, Gastroesophageal Reflux Disease, Esophagitis Presence Not Specified    No ER follow up scheduled    Tracy Verenice/TC

## 2019-10-22 NOTE — TELEPHONE ENCOUNTER
Patient scheduled for 10/25/2019 at 9am with Dr. Nick.  Spoke with patient and she is aware.    Aide Fair RN

## 2019-10-22 NOTE — TELEPHONE ENCOUNTER
"Hospital/TCU/ED for chronic condition Discharge Protocol    \"Hi, my name is Aide Fair RN, a registered nurse, and I am calling from [facility].  I am calling to follow up and see how things are going for you after your recent emergency visit/hospital/TCU stay.\"    Tell me how you are doing now that you are home?\" Patient states that she is feeling good.  Has a follow up appointment with GYN tomorrow.  Has been told to stop her Lisinopril until she sees Dr. Nick for further direction.      Discharge Instructions    \"Let's review your discharge instructions.  What is/are the follow-up recommendations?  Pt. Response:  f/u with PCP if no improvement or worsening.      \"Has an appointment with your primary care provider been scheduled?\"   Yes. (confirm)    \"When you see the provider, I would recommend that you bring your medications with you.\"    Medications    \"Tell me what changed about your medicines when you discharged?\"    Changes to chronic meds?    0-1    \"What questions do you have about your medications?\"    None     New diagnoses of heart failure, COPD, diabetes, or MI?    No              Post Discharge Medication Reconciliation Status: discharge medications reconciled, continue medications without change.    Was MTM referral placed (*Make sure to put transitions as reason for referral)?   No    Call Summary    \"What questions or concerns do you have about your recent visit and your follow-up care?\"     none    \"If you have questions or things don't continue to improve, we encourage you contact us through the main clinic number (give number).  Even if the clinic is not open, triage nurses are available 24/7 to help you.     We would like you to know that our clinic has extended hours (provide information).  We also have urgent care (provide details on closest location and hours/contact info)\"      \"Thank you for your time and take care!\"    Aide Fair RN           "

## 2019-10-22 NOTE — TELEPHONE ENCOUNTER
LVM for patient to return call can schedule in same day slot this week per Snadoval Caldera/ANGELLA

## 2019-10-23 ENCOUNTER — TRANSFERRED RECORDS (OUTPATIENT)
Dept: HEALTH INFORMATION MANAGEMENT | Facility: CLINIC | Age: 69
End: 2019-10-23

## 2019-10-23 LAB
HPV ABSTRACT: NORMAL
PAP-ABSTRACT: NORMAL

## 2019-10-24 ENCOUNTER — PRE VISIT (OUTPATIENT)
Dept: ONCOLOGY | Facility: CLINIC | Age: 69
End: 2019-10-24

## 2019-10-24 NOTE — TELEPHONE ENCOUNTER
ONCOLOGY INTAKE: Records Information      APPT INFORMATION:  Referring provider:  Dr. Dorita Younger MD  Referring provider s clinic:  [facility]  Reason for visit/diagnosis:  complex cyst of left ovary and enlarged uteris with elevated CA-125  Has patient been notified of appointment date and time?: yes, per referring office    RECORDS INFORMATION:  Were the records received with the referral (via [employer])? no    Has patient been seen for any external appt for this diagnosis? yes    If yes, where? [facility]    Has patient had any imaging or procedures outside of [facility] for this condition? yes      If Yes, where? [facility]    ADDITIONAL INFORMATION:  Per referring clinic they are going to fax records/referral to CSS team.

## 2019-10-25 ENCOUNTER — OFFICE VISIT (OUTPATIENT)
Dept: FAMILY MEDICINE | Facility: CLINIC | Age: 69
End: 2019-10-25
Payer: COMMERCIAL

## 2019-10-25 VITALS
DIASTOLIC BLOOD PRESSURE: 80 MMHG | TEMPERATURE: 97.6 F | RESPIRATION RATE: 16 BRPM | HEART RATE: 64 BPM | SYSTOLIC BLOOD PRESSURE: 116 MMHG | BODY MASS INDEX: 39.01 KG/M2 | WEIGHT: 249.1 LBS | OXYGEN SATURATION: 96 %

## 2019-10-25 DIAGNOSIS — Z86.39 HISTORY OF LOW POTASSIUM: ICD-10-CM

## 2019-10-25 DIAGNOSIS — R10.13 ABDOMINAL PAIN, EPIGASTRIC: ICD-10-CM

## 2019-10-25 DIAGNOSIS — N83.8 OVARIAN MASS: ICD-10-CM

## 2019-10-25 DIAGNOSIS — N85.2 ENLARGED UTERUS: ICD-10-CM

## 2019-10-25 DIAGNOSIS — R97.1 ELEVATED CA-125: ICD-10-CM

## 2019-10-25 DIAGNOSIS — I10 ESSENTIAL HYPERTENSION: Primary | ICD-10-CM

## 2019-10-25 LAB
ANION GAP SERPL CALCULATED.3IONS-SCNC: 6 MMOL/L (ref 3–14)
BUN SERPL-MCNC: 8 MG/DL (ref 7–30)
CALCIUM SERPL-MCNC: 8.9 MG/DL (ref 8.5–10.1)
CHLORIDE SERPL-SCNC: 100 MMOL/L (ref 94–109)
CO2 SERPL-SCNC: 32 MMOL/L (ref 20–32)
CREAT SERPL-MCNC: 0.74 MG/DL (ref 0.52–1.04)
GFR SERPL CREATININE-BSD FRML MDRD: 82 ML/MIN/{1.73_M2}
GLUCOSE SERPL-MCNC: 88 MG/DL (ref 70–99)
POTASSIUM SERPL-SCNC: 3.5 MMOL/L (ref 3.4–5.3)
SODIUM SERPL-SCNC: 136 MMOL/L (ref 133–144)

## 2019-10-25 PROCEDURE — 99214 OFFICE O/P EST MOD 30 MIN: CPT | Performed by: FAMILY MEDICINE

## 2019-10-25 PROCEDURE — 80048 BASIC METABOLIC PNL TOTAL CA: CPT | Performed by: FAMILY MEDICINE

## 2019-10-25 PROCEDURE — 36415 COLL VENOUS BLD VENIPUNCTURE: CPT | Performed by: FAMILY MEDICINE

## 2019-10-25 NOTE — PROGRESS NOTES
Subjective     Calista Atkinson is a 69 year old female who presents to clinic today for the following health issues:    HPI       Hospital Follow-up Visit:    Hospital/Nursing Home/IP Rehab Facility: [facility]  Date of Admission: 10/17/19  Date of Discharge: 10/18/19  Reason(s) for Admission: hypokalemia            Problems taking medications regularly:  None       Medication changes since discharge: discontinued lisinopril-hydrochlorothiazide. Stopped due to dehydration, kidney function is abnromal  (PRINZIDE/ZESTORETIC) 20-12.5 MG tablet. BP has been good without it.  Has low potassium due to poor intake.        Problems adhering to non-medication therapy:  None  \  Summary of hospitalization:  [facility] discharge summary reviewed  Diagnostic Tests/Treatments reviewed.  Follow up needed: OBGYN for enlarged uterus and ovarian mass, and going to see Oncology, in [place], 11/12/19 Yue, CA-125 was elevated to 52,   Biopsy was done at [facility]  Other Healthcare Providers Involved in Patient s Care:         None  Update since discharge: improved. Did have another episode of epigastric pain last Sunday morning, since then has been doing well, taking nexium, drinking 2-3 cups of coffee and eating chil.  Is still smoking 3 cigs per day    Post Discharge Medication Reconciliation: discharge medications reconciled, continue medications without change.  Plan of care communicated with patient     Coding guidelines for this visit:  Type of Medical   Decision Making Face-to-Face Visit       within 7 Days of discharge Face-to-Face Visit        within 14 days of discharge   Moderate Complexity [id number]   High Complexity [id number]            PROBLEMS TO ADD ON...    BP Readings from Last 3 Encounters:   10/25/19 116/80   10/18/19 111/58   10/17/19 115/59    Wt Readings from Last 3 Encounters:   10/25/19 113 kg (249 lb 1.6 oz)   10/17/19 111.6 kg (246 lb)   10/17/19 111.6 kg (246 lb)     "                PROBLEMS TO ADD ON...  Reviewed and updated as needed this visit by Provider         Review of Systems   ROS COMP: Constitutional, HEENT, cardiovascular, pulmonary, GI, , musculoskeletal, neuro, skin, endocrine and psych systems are negative, except as otherwise noted.      Objective    /80 (BP Location: Right arm, Patient Position: Sitting, Cuff Size: Adult Large)   Pulse 64   Temp 97.6  F (36.4  C) (Oral)   Resp 16   Wt 113 kg (249 lb 1.6 oz)   SpO2 96%   BMI 39.01 kg/m    Body mass index is 39.01 kg/m .  Physical Exam   GENERAL: healthy, alert and no distress  EYES: Eyes grossly normal to inspection, PERRL and conjunctivae and sclerae normal  HENT: ear canals and TM's normal, nose and mouth without ulcers or lesions  NECK: no adenopathy, no asymmetry, masses, or scars and thyroid normal to palpation  RESP: lungs clear to auscultation - no rales, rhonchi or wheezes  CV: regular rate and rhythm, normal S1 S2, no S3 or S4, no murmur, click or rub, no peripheral edema and peripheral pulses strong  MS: no gross musculoskeletal defects noted, no edema    Diagnostic Test Results:  Labs reviewed in Epic        Assessment & Plan     1. Essential hypertension  Well controlled, cont same, off lisinopril    2. History of low potassium  Recheck today  - Basic metabolic panel    3. Enlarged uterus  Pt seeing shira [facility]ANTRE[facility], plan in place, bx done    4. Ovarian mass  See above    5. Elevated CA-125  See above    6. Abdominal pain, epigastric  Improving, still with some GERD issues       Tobacco Cessation:   reports that she has been smoking cigarettes. She has been smoking about 0.50 packs per day. She has never used smokeless tobacco.  Tobacco Cessation Action Plan: Information offered: Patient not interested at this time      BMI:   Estimated body mass index is 39.01 kg/m  as calculated from the following:    Height as of 10/17/19: 1.702 m (5' 7\").    Weight as of this encounter: 113 kg (249 lb " 1.6 oz).   Weight management plan: Discussed healthy diet and exercise guidelines            Return in about 6 months (around 4/25/2020) for blood pressure check.    Twyla Nick MD  [facility]

## 2019-10-25 NOTE — NURSING NOTE
"Chief Complaint   Patient presents with     Hospital F/U     Initial /80 (BP Location: Right arm, Patient Position: Sitting, Cuff Size: Adult Large)   Pulse 64   Temp 97.6  F (36.4  C) (Oral)   Resp 16   Wt 113 kg (249 lb 1.6 oz)   SpO2 96%   BMI 39.01 kg/m   Estimated body mass index is 39.01 kg/m  as calculated from the following:    Height as of 10/17/19: 1.702 m (5' 7\").    Weight as of this encounter: 113 kg (249 lb 1.6 oz).  BP completed using cuff size large RIGHT arm    Lisa Magill, CMA    "

## 2019-10-25 NOTE — TELEPHONE ENCOUNTER
RECORDS STATUS - ALL OTHER DIAGNOSIS      RECORDS RECEIVED FROM: Epic/CE   DATE RECEIVED: 11/12/2019    NOTES STATUS DETAILS   OFFICE NOTE from referring provider Complete [facility]   OFFICE NOTE from medical oncologist N/A    DISCHARGE SUMMARY from hospital Complete EPIC   DISCHARGE REPORT from the ER See Hospital note above     OPERATIVE REPORT N/A    MEDICATION LIST Complete [facility]   CLINICAL TRIAL TREATMENTS TO DATE N/A    LABS     PATHOLOGY REPORTS N/A    ANYTHING RELATED TO DIAGNOSIS Complete EPIC   GENONOMIC TESTING     TYPE:     IMAGING (NEED IMAGES & REPORT)     CT SCANS Complete PACS   Xray Cervical  Complete PACS   MRI     MAMMO     ULTRASOUND     PET       Action    Action Taken 10/25/2019 2pm     I faxed MR ([facility]) to HIM.

## 2019-11-08 NOTE — PROGRESS NOTES
Consult Notes on Referred Patient        Dr. Dorita Younger MD  [facility]  [address]  [place] [address]       RE: Calista Atkinson  : 1950  NATHAN: 2019    Dear Dr. Dorita Younger:    I had the pleasure of seeing your patient Calista Atkinson here at the Gynecologic Cancer Clinic at the [facility] on 2019.  As you know she is a very pleasant 69 year old woman with a recent diagnosis of pelvic mass, PMB, elevated .  Given these findings she was subsequently sent to the Gynecologic Cancer Clinic for new patient consultation.  She is accompanied today by her two daugthers.    Patient has been having abdominal bloating, early satiety, constipation and general abdominal discomfort for the past 3-4 weeks.  She was eventually admitted for dehydration as she wasn't eating or drinking well and was found to have RENETTA, hypokalemia and the imaging findings below.  At that time she wasn't having any PMB but since her attempted EMB she has been having PMB.  Mostly light spotting.  Since her hospital discharge her symptoms have improved slightly, however she still does not have a normal appetite and feels bloating/early satiety.    19:  CT Chest lung cancer screen:  1. ACR Assessment Category:  Lung-RADS Category 2. Benign appearance or behavior. Recommendation: Continue annual screening, if clinically relevant (please order exam code IMG 2290). 2. Significant Incidental Finding(s):  Category S: No.      10/17/19:  CT A/P:  1. The uterus is markedly enlarged, possibly related to the presence of fibroids. Pelvic ultrasound and/or MRI may be helpful for further characterization. 2. A left ovarian cystic lesion is indeterminate, and measures 6.6 cm. Ovarian malignancy is not excluded. 3. Colonic diverticulosis, without convincing evidence for diverticulitis.    10/23/19:  EMB:  Acellular sample    10/23/19:   = 56    Review of Systems:  Systemic           no  weight changes; no fever; no chills; no night sweats; + appetite changes  Skin           no rashes, or lesions  Eye           no irritation; no changes in vision  Evelio-Laryngeal           no dysphagia; no hoarseness   Pulmonary    + cough; + wheezing; no shortness of breath  Cardiovascular    no chest pain; no palpitations  Gastrointestinal    no diarrhea; + constipation; + abdominal pain; no changes in bowel  habits; no blood in stool  Genitourinary   no urinary frequency; no urinary urgency; no dysuria; no pain; no abnormal vaginal discharge; + abnormal vaginal bleeding  Breast    no breast discharge; no breast changes; no breast pain  Musculoskeletal    no myalgias; no arthralgias; no back pain  Psychiatric           no depressed mood; no anxiety    Hematologic            no tender lymph nodes; no noticeable swellings or lumps   Endocrine    no hot flashes; no heat/cold intolerance         Neurological   no tremor; no numbness and tingling; no headaches; no difficulty sleeping    Obstetrics and Gynecology History:  ,  x 3, c/s x 1   no PMB or HRT use      Past Medical History:  Past Medical History:   Diagnosis Date     CAD (coronary artery disease) 2012    STEMI and stent      Carotid artery plaque 2015     COPD (chronic obstructive pulmonary disease) (H)      CVA (cerebral infarction)      Gastro-oesophageal reflux disease      Hyperlipidemia      Hypertension      Myocardial infarction (H)      S/P carotid endarterectomy 2015       Past Surgical History:  Past Surgical History:   Procedure Laterality Date     ANGIOGRAM  12    thrombectomy and stent RCA     CARDIAC SURGERY        SECTION       ENDARTERECTOMY CAROTID Left 3/27/12     HEART CATH LEFT HEART CATH  03/02/15    Evidence of old MI, Continued good results of the stent in mid RCA with mild disease elsewhere in the RCA, Disease in very small branch of the distal CFX.      TONSILLECTOMY      age 9       Health  Maintenance:  Last Pap Smear: 5 years ago              Result: normal  She has not had a history of abnormal Pap smears.    Last Mammogram: 7/13/18              Result: normal      She has not had a history of abnormal mammograms.    Last Colonoscopy: Never              Result: FOBT yearly all negative      Current Medications:   has a current medication list which includes the following prescription(s): acetaminophen, albuterol, albuterol, aspirin, esomeprazole, metoprolol tartrate, and rosuvastatin.     Allergies:   Patient has no known allergies.      Social History:  Social History     Socioeconomic History     Marital status:      Spouse name: Not on file     Number of children: Not on file     Years of education: Not on file     Highest education level: Not on file   Occupational History     Not on file   Social Needs     Financial resource strain: Not on file     Food insecurity:     Worry: Not on file     Inability: Not on file     Transportation needs:     Medical: Not on file     Non-medical: Not on file   Tobacco Use     Smoking status: Current Every Day Smoker     Packs/day: 0.50     Types: Cigarettes     Smokeless tobacco: Never Used     Tobacco comment: 4-5 cig daily.    Substance and Sexual Activity     Alcohol use: No     Alcohol/week: 0.0 standard drinks     Drug use: No     Sexual activity: Not Currently   Lifestyle     Physical activity:     Days per week: Not on file     Minutes per session: Not on file     Stress: Not on file   Relationships     Social connections:     Talks on phone: Not on file     Gets together: Not on file     Attends Tenriism service: Not on file     Active member of club or organization: Not on file     Attends meetings of clubs or organizations: Not on file     Relationship status: Not on file     Intimate partner violence:     Fear of current or ex partner: Not on file     Emotionally abused: Not on file     Physically abused: Not on file     Forced sexual  "activity: Not on file   Other Topics Concern     Parent/sibling w/ CABG, MI or angioplasty before 65F 55M? Not Asked      Service Not Asked     Blood Transfusions Not Asked     Caffeine Concern Yes     Comment: 2-3 cups daily     Occupational Exposure Not Asked     Hobby Hazards Not Asked     Sleep Concern Yes     Comment: off and on sleeping     Stress Concern Not Asked     Weight Concern Not Asked     Special Diet No     Back Care Not Asked     Exercise No     Bike Helmet Not Asked     Seat Belt Not Asked     Self-Exams Not Asked   Social History Narrative     Not on file       Lives alone, feels safe at home.  Retired cook.  Enjoys puzzles, playing cards, going to the [employer].  Gets SOB walking a couple blocks or up a flight of stairs.  Does not have an advanced directive on file and would like her children to be her POA.  Would like full resuscitation if reversible cause is identified, however would not like to be kept on life sustaining measures long-term.     Family History:   The patient's family history is significant for.  Family History   Problem Relation Age of Onset     Myocardial Infarction Mother      Hypertension Mother      Prostate Cancer Father      Cancer Father         Lymphnode     Cancer - colorectal Paternal Grandfather      Colon Cancer Paternal Grandfather      Alcohol/Drug Son         Alcoholic     Hypertension Daughter      Hyperlipidemia Daughter      Myocardial Infarction Brother      Respiratory Brother         Pneumonia          Physical Exam:   BP (!) 155/80   Pulse 65   Temp 97  F (36.1  C) (Oral)   Resp 16   Ht 1.702 m (5' 7\")   Wt 113.4 kg (250 lb)   SpO2 95%   BMI 39.16 kg/m    Body mass index is 39.16 kg/m .    General Appearance: healthy and alert, no distress     HEENT:  no thyromegaly, no palpable nodules or masses        Cardiovascular: regular rate and rhythm, no gallops, rubs or murmurs     Respiratory: lungs clear, no rales, rhonchi or wheezes, normal " diaphragmatic excursion    Musculoskeletal: extremities non tender and without edema    Skin: no lesions or rashes     Neurological: normal gait, no gross defects     Psychiatric: appropriate mood and affect                               Hematological: normal cervical, supraclavicular and inguinal lymph nodes     Gastrointestinal:       abdomen soft, non-distended, uterus palpable to a level above the umbilicus and more to the right upper abdomen which is significantly tender to palpation    Genitourinary: External genitalia and urethral meatus appears normal.  Vagina is smooth without nodularity or masses.  Cervix appears normal and without lesions.  Bimanual exam reveals and enlarged uterus which is tender to palpation.  Recto-vaginal exam confirms these findings.      Assessment:    Calista Atkinson is a 69 year old woman with a new diagnosis of pelvic mass, elevated .     A total of 60 minutes was spent with the patient, >50% of which were spent in counseling the patient and/or treatment planning.      Plan:     1.)    Enlarged uterus, pelvic mass, elevated .  We reviewed her recent imaging showing a significantly enlarged uterus as well as a mass in the right adnexa.  We discussed possible etiologies including benign, malignant and borderline.  We discussed that the elevated  is somewhat elevated and does make a malignancy more likely, however the CT without evidence of metastatic disease is reassuring.  We also discussed that since she was not having vaginal bleeding prior to her attempted EMB that the uterus could be benign fibroids which she has had for some time, however she reports she could feel her uterus getting bigger lately.  We discussed the role of frozen section analysis and that further surgical decisions would be based on these findings.  Given the large uterus a minimally invasive approach is not feasible.  Risks, benefits, indications and alternatives were discussed with the  patient.  All her questions were answered and consents were signed for exploratory laparotomy, removal of uterus, cervix, both ovaries and fallopian tubes, possible cancer surgery on 11/26.  Given her significant cardiac history she will see PAC prior to surgery.     2.) Genetic risk factors were assessed and the patient does not meet the qualifications for a referral unless malignancy is identified.      3.) Labs and/or tests ordered include:  Deferred to PAC     4.) Health maintenance issues addressed today include pt is due for a colonoscopy which will be addressed after surgery.    5.) Pre-op teaching was completed today.        Thank you for allowing us to participate in the care of your patient.         Sincerely,    Natalia Meza MD  Gynecologic Oncology  [facility]       WILBERT HARKINS

## 2019-11-12 ENCOUNTER — ONCOLOGY VISIT (OUTPATIENT)
Dept: ONCOLOGY | Facility: CLINIC | Age: 69
End: 2019-11-12
Attending: OBSTETRICS & GYNECOLOGY
Payer: COMMERCIAL

## 2019-11-12 ENCOUNTER — PRE VISIT (OUTPATIENT)
Dept: ONCOLOGY | Facility: CLINIC | Age: 69
End: 2019-11-12

## 2019-11-12 ENCOUNTER — DOCUMENTATION ONLY (OUTPATIENT)
Dept: ONCOLOGY | Facility: CLINIC | Age: 69
End: 2019-11-12

## 2019-11-12 ENCOUNTER — HOSPITAL ENCOUNTER (OUTPATIENT)
Facility: CLINIC | Age: 69
Setting detail: SPECIMEN
End: 2019-11-12
Attending: OBSTETRICS & GYNECOLOGY
Payer: COMMERCIAL

## 2019-11-12 VITALS
HEIGHT: 67 IN | BODY MASS INDEX: 39.24 KG/M2 | WEIGHT: 250 LBS | TEMPERATURE: 97 F | DIASTOLIC BLOOD PRESSURE: 80 MMHG | RESPIRATION RATE: 16 BRPM | OXYGEN SATURATION: 95 % | HEART RATE: 65 BPM | SYSTOLIC BLOOD PRESSURE: 155 MMHG

## 2019-11-12 DIAGNOSIS — N85.2 ENLARGED UTERUS: ICD-10-CM

## 2019-11-12 DIAGNOSIS — R19.00 PELVIC MASS: Primary | ICD-10-CM

## 2019-11-12 PROCEDURE — G0463 HOSPITAL OUTPT CLINIC VISIT: HCPCS

## 2019-11-12 PROCEDURE — 99205 OFFICE O/P NEW HI 60 MIN: CPT | Performed by: OBSTETRICS & GYNECOLOGY

## 2019-11-12 RX ORDER — CEFAZOLIN SODIUM 1 G
1 VIAL (EA) INJECTION SEE ADMIN INSTRUCTIONS
Status: CANCELLED | OUTPATIENT
Start: 2019-11-12

## 2019-11-12 RX ORDER — HEPARIN SODIUM 10000 [USP'U]/ML
5000 INJECTION, SOLUTION INTRAVENOUS; SUBCUTANEOUS
Status: CANCELLED | OUTPATIENT
Start: 2019-11-12

## 2019-11-12 RX ORDER — PHENAZOPYRIDINE HYDROCHLORIDE 100 MG/1
200 TABLET, FILM COATED ORAL ONCE
Status: CANCELLED | OUTPATIENT
Start: 2019-11-12 | End: 2019-11-12

## 2019-11-12 ASSESSMENT — MIFFLIN-ST. JEOR: SCORE: 1686.62

## 2019-11-12 ASSESSMENT — PAIN SCALES - GENERAL: PAINLEVEL: NO PAIN (0)

## 2019-11-12 NOTE — PROGRESS NOTES
I scheduled surgery for this patient with Dr. Meza on 11/26 at [facility]. Scheduled per MD orders.    The RN has completed the education regarding the surgery, and they were provided a surgery packet with instructions and a map.     They are aware that they will receive a call  ~2 days prior to the scheduled procedure and will be given an exact arrival/start time.    Per communication - I did not need to call the patient to provide any extra additional information. I will follow up if anything changes.

## 2019-11-12 NOTE — NURSING NOTE
"Oncology Rooming Note    November 12, 2019 9:58 AM   Calista Atkinson is a 69 year old female who presents for:    Chief Complaint   Patient presents with     Oncology Clinic Visit     New Patient Consult     Initial Vitals: Pulse 65   Temp 97  F (36.1  C) (Oral)   Resp 16   Ht 1.702 m (5' 7\")   Wt 113.4 kg (250 lb)   SpO2 95%   BMI 39.16 kg/m   Estimated body mass index is 39.16 kg/m  as calculated from the following:    Height as of this encounter: 1.702 m (5' 7\").    Weight as of this encounter: 113.4 kg (250 lb). Body surface area is 2.32 meters squared.  No Pain (0) Comment: Data Unavailable   No LMP recorded. Patient is postmenopausal.  Allergies reviewed: Yes  Medications reviewed: Yes    Medications: Medication refills not needed today.  Pharmacy name entered into Shoto: [facility] [address] [place] [address] - [id number]    Clinical concerns: New Patient Consult       Vianey Russo CMA              "

## 2019-11-12 NOTE — PATIENT INSTRUCTIONS
You have been scheduled for surgery on: 11/26    Diagnosis:  Pelvic mass    The surgical procedure is: Exploratory laparotomy, removal of uterus, cervix, both ovaries and fallopian tubes, possible cancer surgery    Anticipated length of surgery: 1-2 hrs.  You will be in the recovery room for approximately 2-3 hrs after surgery.  Your family will not be able to see you until after you leave the recovery room.    Length of hospital stay:  You will need to be in the hospital 2-3 days.  ______________________________________________________________________    Preparation for Surgery:    To Schedule   1.  PAC visit prior to surgery   2.  Post-operative exam with me 1-2 weeks following surgery      Postoperative Restrictions:  No heavy lifting >20lbs for six weeks, nothing in the vagina (no tampons, no intercourse, no douching) for eight weeks.     Postoperative visit:  Return to clinic 1-2 weeks after surgery for post operative visit.      Please contact the clinic with any questions or concerns.    Natalia Meza MD  Gynecologic Oncology  [facility]

## 2019-11-13 ENCOUNTER — PRE VISIT (OUTPATIENT)
Dept: SURGERY | Facility: CLINIC | Age: 69
End: 2019-11-13

## 2019-11-13 NOTE — TELEPHONE ENCOUNTER
FUTURE VISIT INFORMATION      SURGERY INFORMATION:    Date: 11/26/19    Location: [facility] OR    Surgeon:  Natalia Saravia    Anesthesia Type:  General    RECORDS REQUESTED FROM:       Primary Care Provider: Twyla Juarez    Pertinent Medical History: COPD, Hypertension, CAD    Most recent EKG+ Tracing: 10/17/19    Most recent ECHO: 6/8/12- [facility]    Most recent Cardiac Stress Test: 2/9/15    Most recent Coronary Angiogram: 3/2/15

## 2019-11-15 ENCOUNTER — TELEPHONE (OUTPATIENT)
Dept: FAMILY MEDICINE | Facility: CLINIC | Age: 69
End: 2019-11-15

## 2019-11-15 NOTE — TELEPHONE ENCOUNTER
Having some nasal discharge, also coughing up some mucus (whitish color)  Cough can be severe, has had a few gagging episodes.   No SOB, has felt feverish at times. (Hasnt checked temp)    Has surgery scheduled for 11/26/19, worried about getting worse before then.   Advised UC evaluation tonight. (especially for severe coughing fits)    She agrees with plan. Will do into UC tonight.     Qiana Howard RN -- [facility]

## 2019-11-15 NOTE — TELEPHONE ENCOUNTER
11/15/19    Pt called stating that she has a bad cold wants to know if Dr Nick will give her a Z-Pac and something for her cough.     Pt contact # [phone]    Pharmacy [facility] in [place]

## 2019-11-18 ENCOUNTER — ANESTHESIA EVENT (OUTPATIENT)
Dept: SURGERY | Facility: CLINIC | Age: 69
DRG: 742 | End: 2019-11-18
Payer: COMMERCIAL

## 2019-11-18 ENCOUNTER — OFFICE VISIT (OUTPATIENT)
Dept: SURGERY | Facility: CLINIC | Age: 69
End: 2019-11-18
Attending: OBSTETRICS & GYNECOLOGY
Payer: COMMERCIAL

## 2019-11-18 VITALS
RESPIRATION RATE: 19 BRPM | HEIGHT: 67 IN | HEART RATE: 74 BPM | OXYGEN SATURATION: 94 % | TEMPERATURE: 97.7 F | WEIGHT: 256.4 LBS | DIASTOLIC BLOOD PRESSURE: 85 MMHG | SYSTOLIC BLOOD PRESSURE: 128 MMHG | BODY MASS INDEX: 40.24 KG/M2

## 2019-11-18 DIAGNOSIS — R19.00 PELVIC MASS: ICD-10-CM

## 2019-11-18 DIAGNOSIS — Z01.818 PRE-OP EXAMINATION: Primary | ICD-10-CM

## 2019-11-18 LAB
ANION GAP SERPL CALCULATED.3IONS-SCNC: 3 MMOL/L (ref 3–14)
BUN SERPL-MCNC: 7 MG/DL (ref 7–30)
CALCIUM SERPL-MCNC: 9 MG/DL (ref 8.5–10.1)
CHLORIDE SERPL-SCNC: 102 MMOL/L (ref 94–109)
CO2 SERPL-SCNC: 32 MMOL/L (ref 20–32)
CREAT SERPL-MCNC: 0.54 MG/DL (ref 0.52–1.04)
ERYTHROCYTE [DISTWIDTH] IN BLOOD BY AUTOMATED COUNT: 13.3 % (ref 10–15)
GFR SERPL CREATININE-BSD FRML MDRD: >90 ML/MIN/{1.73_M2}
GLUCOSE SERPL-MCNC: 94 MG/DL (ref 70–99)
HCT VFR BLD AUTO: 44.6 % (ref 35–47)
HGB BLD-MCNC: 14.7 G/DL (ref 11.7–15.7)
MCH RBC QN AUTO: 30.6 PG (ref 26.5–33)
MCHC RBC AUTO-ENTMCNC: 33 G/DL (ref 31.5–36.5)
MCV RBC AUTO: 93 FL (ref 78–100)
NT-PROBNP SERPL-MCNC: 389 PG/ML (ref 0–125)
PLATELET # BLD AUTO: 161 10E9/L (ref 150–450)
POTASSIUM SERPL-SCNC: 3.9 MMOL/L (ref 3.4–5.3)
RBC # BLD AUTO: 4.81 10E12/L (ref 3.8–5.2)
SODIUM SERPL-SCNC: 137 MMOL/L (ref 133–144)
WBC # BLD AUTO: 6.7 10E9/L (ref 4–11)

## 2019-11-18 RX ORDER — BENZONATATE 100 MG/1
100 CAPSULE ORAL 3 TIMES DAILY PRN
COMMUNITY
End: 2019-12-10

## 2019-11-18 RX ORDER — AZITHROMYCIN 250 MG/1
250 TABLET, FILM COATED ORAL EVERY MORNING
COMMUNITY
End: 2019-12-10

## 2019-11-18 ASSESSMENT — PAIN SCALES - GENERAL: PAINLEVEL: NO PAIN (0)

## 2019-11-18 ASSESSMENT — COPD QUESTIONNAIRES
CAT_SEVERITY: MILD
COPD: 1

## 2019-11-18 ASSESSMENT — MIFFLIN-ST. JEOR: SCORE: 1715.65

## 2019-11-18 ASSESSMENT — LIFESTYLE VARIABLES: TOBACCO_USE: 1

## 2019-11-18 NOTE — PATIENT INSTRUCTIONS
Preparing for Your Surgery      Name:  Calista Atkinson   MRN:  [id number]   :  1950   Today's Date:  2019     Arriving for surgery:  Surgery date:  19  Arrival time:  6 am    Please come to:       [facility] [address]  [address] [place]  [address]    - ? parking is available in front of the hospital      -    Please proceed to [address] on the 3rd floor. [phone]?     - ?If you are in need of directions, wheelchair or escort please stop at the Information Desk in the lobby.  Inform the information person that you are here for surgery; a wheelchair and escort to [address] will be provided.?     -  Bring your ID and insurance card.    - If you are scheduled to go home the Same Day as surgery you must have a responsible adult as a  and to stay with you overnight the first 24 hours after surgery.     What can I eat or drink?  -  You may have solid food or milk products until 8 hours prior to your surgery. (Until 12 midnight )  -  You may have water, apple juice or 7up/Sprite until 2 hours prior to your surgery. (Until 6 am )    Which medicines can I take?       -  Do not bring your own medications to the hospital, except for inhalers.        -  Follow Gynecology Clinic instructions regarding Ibuprofen. If no instructions given, NO Ibuprofen the day prior to surgery.          -  Hold Naproxen (Aleve) 2 days prior to surgery.    -  Hold Aspirin for 7 days prior to surgery. Last dose 19.    -  Please take these medications the morning of surgery:  Esomeprazole, Metoprolol, Atrovent inhaler  Acetaminophen (Tylenol) if needed  Albuterol inhaler/neb if needed  How do I prepare myself?  -  Take two showers: one the night before surgery; and one the morning of surgery.         Use Scrubcare or Hibiclens to wash from neck down.  You may use your own shampoo and conditioner. No other hair products.   -  Do NOT use lotion, powder, colognes, deodorant,  or antiperspirant the day of your surgery.  -  Do NOT wear any makeup, fingernail polish or jewelry.    Questions or Concerns:  If you have questions or concerns prior to your surgery, call [phone]. (Mon - Fri   8 am- 5:30 pm)  Questions about surgery, contact your Surgeons office.      AFTER YOUR SURGERY  Breathing exercises   Breathing exercises help you recover faster. Take deep breaths and let the air out slowly. This will:     Help you wake up after surgery.    Help prevent complications like pneumonia.  Preventing complications will help you go home sooner.   We may give you a breathing device (incentive spirometer) to encourage you to breathe deeply.   Nausea and vomiting   You may feel sick to your stomach after surgery; if so, let your nurse know.    Pain control:  After surgery, you may have pain. Our goal is to help you manage your pain. Pain medicine will help you feel comfortable enough to do activities that will help you heal.  These activities may include breathing exercises, walking and physical therapy.   To help your health care team treat your pain we will ask: 1) If you have pain  2) where it is located 3) describe your pain in your words  Methods of pain control include medications given by mouth, vein or by nerve block for some surgeries.  Sequential Compression Device (SCD) or Pneumo Boots:  You may need to wear SCD S on your legs or feet. These are wraps connected to a machine that pumps in air and releases it. The repeated pumping helps prevent blood clots from forming.

## 2019-11-18 NOTE — H&P
Pre-Operative H & P     CC:  Preoperative exam to assess for increased cardiopulmonary risk while undergoing surgery and anesthesia.    Date of Encounter: 2019  Primary Care Physician:  Twyla Nick     Reason for visit: pre operative examination, pelvic mass    HPI  Calista Atkinson is a 69 year old female who presents for pre-operative H & P in preparation for Exploratory laparotomy, removal of uterus, cervix, both ovaries and fallopian tubes, possible cancer surgery with Dr. Meza on 19 at [facility].     The patient is a 69 year old woman who went to the ER on 10/17/2019 for complaints of abdominal fullness, nausea, change in bowel movements and early satiety.  She was found to have hypokalemia secondary to poor oral intake had acute kidney injury with elevated creatinine.  She had a CT of the abdomen pelvis and this showed an enlarged uterus and an ovarian lesion.  She was given IV fluids and her creatinine improved as well as her p.o. intake.  She was referred to Dr. Meza and met with her on 2019.  They discussed her surgical options and the patient is now scheduled for the procedure as above.    History is obtained from the patient and chart review    Past Medical History  Past Medical History:   Diagnosis Date     CAD (coronary artery disease) 2012    STEMI and stent      Carotid artery plaque 2015     COPD (chronic obstructive pulmonary disease) (H)      CVA (cerebral infarction)      Gastro-oesophageal reflux disease      Hyperlipidemia      Hypertension      Myocardial infarction (H)      S/P carotid endarterectomy 2015       Past Surgical History  Past Surgical History:   Procedure Laterality Date     ANGIOGRAM  12    thrombectomy and stent RCA     CARDIAC SURGERY        SECTION       ENDARTERECTOMY CAROTID Left 3/27/12     HEART CATH LEFT HEART CATH  03/02/15    Evidence of old MI, Continued good  results of the stent in mid RCA with mild disease elsewhere in the RCA, Disease in very small branch of the distal CFX.      TONSILLECTOMY      age 9       Hx of Blood transfusions/reactions: denies     Hx of abnormal bleeding or anti-platelet use: ASA 81 mg - hold 7 days    Menstrual history: No LMP recorded. Patient is postmenopausal.:     Steroid use in the last year: none    Personal or FH with difficulty with Anesthesia:  Denies     Prior to Admission Medications  Current Outpatient Medications   Medication Sig Dispense Refill     acetaminophen (TYLENOL) 500 MG tablet Take 1,000 mg by mouth At Bedtime       albuterol (2.5 MG/3ML) 0.083% neb solution Take 1 vial (2.5 mg) by nebulization every 6 hours as needed for shortness of breath / dyspnea or wheezing (Patient taking differently: Take 1 vial by nebulization every 6 hours as needed for shortness of breath / dyspnea or wheezing (Pt last used 11/17/19 in the ER. 11/18/19) ) 360 mL 0     aspirin 81 MG EC tablet Take 81 mg by mouth every morning        azithromycin (ZITHROMAX) 250 MG tablet Take 250 mg by mouth every morning Pt is currently on 2nd day of treatment plan 11/18/19       benzonatate (TESSALON) 100 MG capsule Take 100 mg by mouth 3 times daily as needed for cough       esomeprazole (NEXIUM) 40 MG DR capsule Take 1 capsule (40 mg) by mouth every morning (before breakfast) Take 30-60 minutes before eating. 30 capsule 0     ipratropium (ATROVENT HFA) 17 MCG/ACT inhaler Inhale 2 puffs into the lungs every 6 hours Pt last used 11/18/19       metoprolol tartrate (LOPRESSOR) 25 MG tablet Take 1 tablet (25 mg) by mouth 2 times daily 180 tablet 1     rosuvastatin (CRESTOR) 40 MG tablet Take 1 tablet (40 mg) by mouth daily (Patient taking differently: Take 40 mg by mouth At Bedtime ) 90 tablet 3     albuterol (PROAIR HFA/PROVENTIL HFA/VENTOLIN HFA) 108 (90 BASE) MCG/ACT Inhaler Inhale 2 puffs into the lungs every 6 hours as needed for shortness of breath /  dyspnea or wheezing 1 Inhaler 3       Allergies  No Known Allergies    Social History  Social History     Socioeconomic History     Marital status:      Spouse name: Not on file     Number of children: Not on file     Years of education: Not on file     Highest education level: Not on file   Occupational History     Not on file   Social Needs     Financial resource strain: Not on file     Food insecurity:     Worry: Not on file     Inability: Not on file     Transportation needs:     Medical: Not on file     Non-medical: Not on file   Tobacco Use     Smoking status: Current Every Day Smoker     Packs/day: 0.25     Types: Cigarettes     Smokeless tobacco: Never Used     Tobacco comment: 4-5 cig daily.    Substance and Sexual Activity     Alcohol use: No     Alcohol/week: 0.0 standard drinks     Drug use: No     Sexual activity: Not Currently   Lifestyle     Physical activity:     Days per week: Not on file     Minutes per session: Not on file     Stress: Not on file   Relationships     Social connections:     Talks on phone: Not on file     Gets together: Not on file     Attends Sabianist service: Not on file     Active member of club or organization: Not on file     Attends meetings of clubs or organizations: Not on file     Relationship status: Not on file     Intimate partner violence:     Fear of current or ex partner: Not on file     Emotionally abused: Not on file     Physically abused: Not on file     Forced sexual activity: Not on file   Other Topics Concern     Parent/sibling w/ CABG, MI or angioplasty before 65F 55M? Not Asked      Service Not Asked     Blood Transfusions Not Asked     Caffeine Concern Yes     Comment: 2-3 cups daily     Occupational Exposure Not Asked     Hobby Hazards Not Asked     Sleep Concern Yes     Comment: off and on sleeping     Stress Concern Not Asked     Weight Concern Not Asked     Special Diet No     Back Care Not Asked     Exercise No     Bike Helmet Not Asked      Seat Belt Not Asked     Self-Exams Not Asked   Social History Narrative     Not on file       Family History  Family History   Problem Relation Age of Onset     Myocardial Infarction Mother      Hypertension Mother      Prostate Cancer Father      Cancer Father         Lymphnode     Cancer - colorectal Paternal Grandfather      Colon Cancer Paternal Grandfather      Alcohol/Drug Son         Alcoholic     Hypertension Daughter      Hyperlipidemia Daughter      Myocardial Infarction Brother      Respiratory Brother         Pneumonia      Other - See Comments Brother         work accident        Review of Systems  ROS/MED HX    ENT/Pulmonary:     (+)NEO risk factors hypertension, obese, tobacco use, Current use 0.25 packs/day  mild COPD, , recent URI unresolved Patient went to urgent care yesterday and started on Zpack: . .    Neurologic:     (+)TIA date: 3/2012 features: right arm numbness,     Cardiovascular: Comment: Chronic venous insufficiency     (+) Dyslipidemia, hypertension-Peripheral Vascular Disease (s/p carotid endarterectomy 2012)-- Carotid Stenosis, CAD, --stent,6/11/12  2 Drug Eluting Stent .. : . . SOTOMAYOR, . :. . Previous cardiac testing Echodate:2015results:Stress Testdate:2015 results:ECG reviewed date:10/17/19 results:Sinus rhythm, posterior infarct age undetermined Cath date: 2015 results:         (-) taking anticoagulants/antiplatelets   METS/Exercise Tolerance:  1 - Eating, dressing   Hematologic: Comments: History of superficial thrombophlebitis        (-) history of blood clots, anemia and History of Transfusion   Musculoskeletal:  - neg musculoskeletal ROS       GI/Hepatic:     (+) GERD Asymptomatic on medication,       Renal/Genitourinary: Comment: Urinary incontinence        Endo:     (+) Obesity, .      Psychiatric:     (+) psychiatric history (seasonal in spring and fall) depression      Infectious Disease:  - neg infectious disease ROS       Malignancy:   (+)   Pelvic mass with elevated  CA-125        Other:    (+) No chance of pregnancy no H/O Chronic Pain,no other significant disability          Physical Exam  Constitutional: Awake, alert, cooperative, no apparent distress, and appears stated age.  Eyes: Pupils equal, round and reactive to light, extra ocular muscles intact, sclera clear, conjunctiva normal.  HENT: Normocephalic, oral pharynx with moist mucus membranes, good dentition. No goiter appreciated.   Respiratory: Rhonchi on the right upper and lower lung fields.   Cardiovascular: Regular rate and rhythm, normal S1 and S2, and no murmur noted.  Carotids +2, no bruits. No edema. Palpable pulses to radial  DP and PT arteries.   GI: Normal bowel sounds, soft, non-distended, non-tender, obese, mass palpated below the umbilicus.   Lymph/Hematologic: No cervical lymphadenopathy and no supraclavicular lymphadenopathy.  Genitourinary:  defer  Skin: Warm and dry.  No rashes at anticipated surgical site.   Musculoskeletal: Full ROM of neck. There is no redness, warmth, or swelling of the joints. Gross motor strength is normal.    Neurologic: Awake, alert, oriented to name, place and time. Cranial nerves II-XII are grossly intact. Gait is normal.   Neuropsychiatric: Calm, cooperative. Normal affect.     Labs: (personally reviewed)  Results for GLENIS HENDRICKSON (MRN [id number]) as of 11/18/2019 15:09   Ref. Range 11/18/2019 14:33   Sodium Latest Ref Range: 133 - 144 mmol/L 137   Potassium Latest Ref Range: 3.4 - 5.3 mmol/L 3.9   Chloride Latest Ref Range: 94 - 109 mmol/L 102   Carbon Dioxide Latest Ref Range: 20 - 32 mmol/L 32   Urea Nitrogen Latest Ref Range: 7 - 30 mg/dL 7   Creatinine Latest Ref Range: 0.52 - 1.04 mg/dL 0.54   GFR Estimate Latest Ref Range: >60 mL/min/1.73_m2 >90   GFR Estimate If Black Latest Ref Range: >60 mL/min/1.73_m2 >90   Calcium Latest Ref Range: 8.5 - 10.1 mg/dL 9.0   Anion Gap Latest Ref Range: 3 - 14 mmol/L 3   N-Terminal Pro Bnp Latest Ref Range: 0 - 125 pg/mL 389 (H)    Glucose Latest Ref Range: 70 - 99 mg/dL 94   WBC Latest Ref Range: 4.0 - 11.0 10e9/L 6.7   Hemoglobin Latest Ref Range: 11.7 - 15.7 g/dL 14.7   Hematocrit Latest Ref Range: 35.0 - 47.0 % 44.6   Platelet Count Latest Ref Range: 150 - 450 10e9/L 161   RBC Count Latest Ref Range: 3.8 - 5.2 10e12/L 4.81   MCV Latest Ref Range: 78 - 100 fl 93   MCH Latest Ref Range: 26.5 - 33.0 pg 30.6   MCHC Latest Ref Range: 31.5 - 36.5 g/dL 33.0   RDW Latest Ref Range: 10.0 - 15.0 % 13.3   The patient's BNP is noted. After discussion with Dr. Jacobs, no indication for further cardiac testing but have order post op troponin.     EKG: 10/17/19  Sinus rhythm, possible inferior infarct age undetermined    Cardiac echo: 2012  Final impression:  1.  The patient was in sinus rhythm during the study.  2.  Normal left ventricular size, thickness and function.  Calculated ejection fraction of 54%.  Normal right ventricular size and function.  4.  There is an atrial septal aneurysm without evidence for patent foramen ovale  5.  All valves structurally appear to be normal.  6.  Trace mitral regurgitation tricuspid regurgitation    Cath: 3/2/2015  CONCLUSIONS:  1. Evidence of old inferior myocardial infarction which was known  previously.  2. Continued good results of the stent in the mid right coronary  artery with mild disease elsewhere in the right coronary artery.  3. Disease in a very small branch of the distal circumflex artery. The  patient apparently is asymptomatic according to the clinic notes. This  vessel is a very small vessel and does not represent a large amount of  the myocardial risks. I would recommend medical treatment at this time.    Stress echo test: 2015  Impression  1.  Myocardial perfusion imaging using single isotope technique  demonstrated abnormal perfusion in the following vascular territories:  An inferior, inferolateral, and inferoseptal non-transmural infarction  in the distribution of the RCA with moderate  noemy-infarct ischemia;  and  An anteroseptal infarction in the distribution of the LAD with  moderate noemy-infarct ischemia.  Approximately 15% of the myocardium appears reversibly ischemic.   2. Gated images demonstrated normal LV systolic contraction and wall  thickening..  The left ventricular systolic function is normal with  and EF of 55%.  3. No previous study was available for comparison.    Carotid US 5/17/19  IMPRESSION:    1. Less than 50% diameter stenosis of the right ICA relative to the  distal ICA diameter.  2. Less than 50% diameter stenosis of the left ICA relative to the  distal ICA diameter.     PFT's: 2017    The patient's records and results personally reviewed by this provider.     Outside records reviewed from: care everywhere     ASSESSMENT and PLAN  Calista is a 69 year old woman who is scheduled for Exploratory laparotomy, removal of uterus, cervix, both ovaries and fallopian tubes, possible cancer surgery on 11/26/19 by Dr. Meza in treatment of pelvic mass.  PAC referral for risk assessment and optimization for anesthesia with comorbid conditions of HTN, HLD, CAD, PVD, COPD, smoking, URI, TIA, obesity, GERD, urinary incontinence, depression:    Pre-operative considerations:  1.  Cardiac:  Functional status- METS 1, the patient can walk 1 block and then gets shortness of breath. She reports that there has been no changes for her.  Intermediate risk surgery with 6.6% (RCRI #) risk of major adverse cardiac event. The patient does have complex cardiac history with CAD of STEMI and had x2 ROSMERY to mRCA in 6/11/2012. She most recently had stress test in 2015 and secondary to some abnormalities she had a repeat cath in 3/2015 and this showed patent stent in mRCA and small distal lesion in the Cx. She also had left carotid endarerectomy in 3/23/12 and had follow up US carotid 5/17/19 with <50% stenosis bilaterally. She saw her cardiologist, Dr. Haney on 2/5/18 with stated that she was doing well and  stable and could follow up PRN.   ~ After discussion with Dr. Jacobs given the patient's history and risk factors but with semi-recent cardiac testing and nature of the surgery, will plan to get BNP today. If greatly abnormal then will need further testing with a stress echo and possible cardiology follow up.   ~ HTN/HLD - continue metoprolol and crestor. Hold ASA 81 mg for 7 days pre op.     2.  Pulm:  Airway feasible.  NEO risk: Intermediate: age, HTN, BMI  ~ Smoking - the patient is smoking 0.25 ppd for the past few years. She has cut back from 1 ppd for >10 years. Smoking cessation discussed  ~ URI - the patient went to urgent care yesterday and got a duonebulizer and started on a z-danitza. She states that she is feeling better. She has rhonchi in the right lung fields but no wheezing. Discussed with Dr. Jacobs and the patient will finish out her z-danitza and monitor her symptoms. If there is no improvement or worsening of symptoms in the next few days then she needs to alert her surgical team and the surgery should be delayed. She is otherwise fine to proceed.   ~ COPD - mild. The patient reports she hasn't used the albuterol, Atrovent or nebulizer for 5 years. She had PFT testing in 2017. FEV1 1.87L and FEV1/FVC 76%    3. Heme:  The patient reports a history of superficial thrombophlebitis and did 1 month of Xarelto. She is not currently on any anticoagulation and no history or family history of DVT or PE.     4. Neuro: TIA 3/2012. She had right arm numbness and was found to have left carotid artery stenosis. No residual symptoms.     5. Endo: Obesity, BMI 40 - consideration for safe lifting techniques.     6. GI:  Risk of PONV score =2.  If > 2, anti-emetic intervention recommended.  ~ GERD - well controlled. Continue on Nexium    7. : Urinary incontinence - patient denies any UTI symptoms.     8. Psych: Depression - the patient reports this is seasonal in the fall and spring. No current issues.     VTE risk:  1.8%-4.5%    Patient was discussed with Dr Jacobs.    The patient is optimized for their procedure. AVS with information on surgery time/arrival time, meds and NPO status given by nursing staff.        Sonya Lopez PA-C  Preoperative Assessment Center  [facility]  Phone: [phone]  Fax: [phone]

## 2019-11-18 NOTE — ANESTHESIA PREPROCEDURE EVALUATION
Anesthesia Pre-Procedure Evaluation    Patient: Calista Atkinson   MRN:     [id number] Gender:   female   Age:    69 year old :      1950        Preoperative Diagnosis: Pelvic mass [R19.00]  Enlarged uterus [N85.2]   Procedure(s):  Exploratory laparotomy, removal of uterus, cervix, both ovaries and fallopian tubes, possible cancer surgery     Past Medical History:   Diagnosis Date     CAD (coronary artery disease) 2012    STEMI and stent      Carotid artery plaque 2015     COPD (chronic obstructive pulmonary disease) (H)      CVA (cerebral infarction)      Gastro-oesophageal reflux disease      Hyperlipidemia      Hypertension      Myocardial infarction (H)      S/P carotid endarterectomy 2015      Past Surgical History:   Procedure Laterality Date     ANGIOGRAM  12    thrombectomy and stent RCA     CARDIAC SURGERY        SECTION       ENDARTERECTOMY CAROTID Left 3/27/12     HEART CATH LEFT HEART CATH  03/02/15    Evidence of old MI, Continued good results of the stent in mid RCA with mild disease elsewhere in the RCA, Disease in very small branch of the distal CFX.      TONSILLECTOMY      age 9          Anesthesia Evaluation     . Pt has had prior anesthetic. Type: General           ROS/MED HX    ENT/Pulmonary:     (+)NEO risk factors hypertension, obese, tobacco use, Current use 0.25 packs/day  mild COPD, , recent URI unresolved Patient went to urgent care yesterday and started on Zpack: . .    Neurologic:     (+)TIA date: 3/2012 features: right arm numbness,     Cardiovascular: Comment: Chronic venous insufficiency     (+) Dyslipidemia, hypertension-Peripheral Vascular Disease (s/p carotid endarterectomy )-- Carotid Stenosis, CAD, --stent,12  2 Drug Eluting Stent .. : . . SOTOMAYOR, . :. . Previous cardiac testing Echodate:results:Stress Testdate: results:ECG reviewed date:10/17/19 results:Sinus rhythm, posterior infarct age undetermined Cath date:  results:          (-) taking anticoagulants/antiplatelets   METS/Exercise Tolerance:  1 - Eating, dressing   Hematologic: Comments: History of superficial thrombophlebitis        (-) history of blood clots, anemia and History of Transfusion   Musculoskeletal:  - neg musculoskeletal ROS       GI/Hepatic:     (+) GERD Asymptomatic on medication,       Renal/Genitourinary: Comment: Urinary incontinence        Endo:     (+) Obesity, .      Psychiatric:     (+) psychiatric history (seasonal in spring and fall) depression      Infectious Disease:  - neg infectious disease ROS       Malignancy:   (+)   Pelvic mass with elevated CA-125        Other:    (+) No chance of pregnancy no H/O Chronic Pain,no other significant disability                        PHYSICAL EXAM:   Mental Status/Neuro: A/A/O; Age Appropriate   Airway: Facies: Feasible  Mallampati: I  Mouth/Opening: Full  TM distance: > 6 cm  Neck ROM: Full   Respiratory: Auscultation: Rhonchi     Resp. Rate: Normal     Resp. Effort: Normal      CV: Rhythm: Regular  Heart: Normal Sounds  Edema: RLE; LLE  Pulses: Normal   Comments:      Dental: Dentures  Dentures: Upper; Lower                 Ref. Range 11/18/2019 14:33   Sodium Latest Ref Range: 133 - 144 mmol/L 137   Potassium Latest Ref Range: 3.4 - 5.3 mmol/L 3.9   Chloride Latest Ref Range: 94 - 109 mmol/L 102   Carbon Dioxide Latest Ref Range: 20 - 32 mmol/L 32   Urea Nitrogen Latest Ref Range: 7 - 30 mg/dL 7   Creatinine Latest Ref Range: 0.52 - 1.04 mg/dL 0.54   GFR Estimate Latest Ref Range: >60 mL/min/1.73_m2 >90   GFR Estimate If Black Latest Ref Range: >60 mL/min/1.73_m2 >90   Calcium Latest Ref Range: 8.5 - 10.1 mg/dL 9.0   Anion Gap Latest Ref Range: 3 - 14 mmol/L 3   N-Terminal Pro Bnp Latest Ref Range: 0 - 125 pg/mL 389 (H)   Glucose Latest Ref Range: 70 - 99 mg/dL 94   WBC Latest Ref Range: 4.0 - 11.0 10e9/L 6.7   Hemoglobin Latest Ref Range: 11.7 - 15.7 g/dL 14.7   Hematocrit Latest Ref Range: 35.0 - 47.0 % 44.6  "  Platelet Count Latest Ref Range: 150 - 450 10e9/L 161   RBC Count Latest Ref Range: 3.8 - 5.2 10e12/L 4.81   MCV Latest Ref Range: 78 - 100 fl 93   MCH Latest Ref Range: 26.5 - 33.0 pg 30.6   MCHC Latest Ref Range: 31.5 - 36.5 g/dL 33.0   RDW Latest Ref Range: 10.0 - 15.0 % 13.3   The patient's BNP is noted. After discussion with Dr. Jacobs, no indication for further cardiac testing but have order post op troponin.     Preop Vitals    BP Readings from Last 3 Encounters:   11/18/19 128/85   11/12/19 (!) 155/80   10/25/19 116/80    Pulse Readings from Last 3 Encounters:   11/18/19 74   11/12/19 65   10/25/19 64      Resp Readings from Last 3 Encounters:   11/18/19 19   11/12/19 16   10/25/19 16    SpO2 Readings from Last 3 Encounters:   11/18/19 94%   11/12/19 95%   10/25/19 96%      Temp Readings from Last 1 Encounters:   11/18/19 97.7  F (36.5  C) (Oral)    Ht Readings from Last 1 Encounters:   11/18/19 1.702 m (5' 7\")      Wt Readings from Last 1 Encounters:   11/18/19 116.3 kg (256 lb 6.4 oz)    Estimated body mass index is 40.16 kg/m  as calculated from the following:    Height as of this encounter: 1.702 m (5' 7\").    Weight as of this encounter: 116.3 kg (256 lb 6.4 oz).     LDA:  Peripheral IV 03/02/15 Left Hand (Active)   Number of days: 1722       Peripheral IV Right Upper forearm (Active)   Number of days:        Right Groin Interventional Cardiac Procedure Access (Active)   Number of days: 1722        Assessment:   ASA SCORE: 3      Smoking Status:  Active Smoker   NPO Status: NPO Appropriate     Plan:   Anes. Type:  General   Pre-Medication: Acetaminophen; Gabapentin; Albuterol   Induction:  IV (Standard)   Airway: ETT; Oral   Access/Monitoring: PIV; 2nd PIV   Maintenance: Balanced     Postop Plan:   Postop Pain: Opioids  Postop Sedation/Airway: Not planned  Disposition: Inpatient/Admit     PONV Management:   Adult Risk Factors: Female, Postop Opioids   Prevention: Ondansetron, Dexamethasone "     CONSENT: Direct conversation   Plan and risks discussed with: Patient   Blood Products: Consented (ALL Blood Products)                PAC Discussion and Assessment    ASA Classification: 3  Case is suitable for: [place]  Anesthetic techniques and relevant risks discussed: GA  Invasive monitoring and risk discussed:   Types:   Possibility and Risk of blood transfusion discussed:   NPO instructions given:   Additional anesthetic preparation and risks discussed:   Needs early admission to pre-op area:   Other:     PAC Resident/NP Anesthesia Assessment:  Calista is a 69 year old woman who is scheduled for Exploratory laparotomy, removal of uterus, cervix, both ovaries and fallopian tubes, possible cancer surgery on 11/26/19 by Dr. Meza in treatment of pelvic mass.  PAC referral for risk assessment and optimization for anesthesia with comorbid conditions of HTN, HLD, CAD, PVD, COPD, smoking, URI, TIA, obesity, GERD, urinary incontinence, depression:    Pre-operative considerations:  1.  Cardiac:  Functional status- METS 1, the patient can walk 1 block and then gets shortness of breath. She reports that there has been no changes for her.  Intermediate risk surgery with 6.6% (RCRI #) risk of major adverse cardiac event. The patient does have complex cardiac history with CAD of STEMI and had x2 ROSMERY to mRCA in 6/11/2012. She most recently had stress test in 2015 and secondary to some abnormalities she had a repeat cath in 3/2015 and this showed patent stent in mRCA and small distal lesion in the Cx. She also had left carotid endarerectomy in 3/23/12 and had follow up US carotid 5/17/19 with <50% stenosis bilaterally. She saw her cardiologist, Dr. Haney on 2/5/18 with stated that she was doing well and stable and could follow up PRN.   ~ After discussion with Dr. Jacobs given the patient's history and risk factors but with semi-recent cardiac testing and nature of the surgery, will plan to get BNP today. If greatly  abnormal then will need further testing with a stress echo and possible cardiology follow up.   ~ HTN/HLD - continue metoprolol and crestor. Hold ASA 81 mg for 7 days pre op.     2.  Pulm:  Airway feasible.  NEO risk: Intermediate: age, HTN, BMI  ~ Smoking - the patient is smoking 0.25 ppd for the past few years. She has cut back from 1 ppd for >10 years. Smoking cessation discussed  ~ URI - the patient went to urgent care yesterday and got a duonebulizer and started on a z-danitza. She states that she is feeling better. She has rhonchi in the right lung fields but no wheezing. Discussed with Dr. Jacobs and the patient will finish out her z-danitza and monitor her symptoms. If there is no improvement or worsening of symptoms in the next few days then she needs to alert her surgical team and the surgery should be delayed. She is otherwise fine to proceed.   ~ COPD - mild. The patient reports she hasn't used the albuterol, Atrovent or nebulizer for 5 years. She had PFT testing in 2017. FEV1 1.87L and FEV1/FVC 76%    3. Heme:  The patient reports a history of superficial thrombophlebitis and did 1 month of Xarelto. She is not currently on any anticoagulation and no history or family history of DVT or PE.     4. Neuro: TIA 3/2012. She had right arm numbness and was found to have left carotid artery stenosis. No residual symptoms.     5. Endo: Obesity, BMI 40 - consideration for safe lifting techniques.     6. GI:  Risk of PONV score =2.  If > 2, anti-emetic intervention recommended.  ~ GERD - well controlled. Continue on Nexium    7. : Urinary incontinence - patient denies any UTI symptoms.     8. Psych: Depression - the patient reports this is seasonal in the fall and spring. No current issues.     VTE risk: 1.8%-4.5%    Patient is optimized and is acceptable candidate for the proposed procedure.  No further diagnostic evaluation is needed.     Patient also evaluated by Dr Jacobs. See recommendations below.     For further  details of assessment, testing, and physical exam please see H and P completed on same date.    Sonya Lopez PA-C          Mid-Level Provider/Resident: Sonya Lopez PA-C  Date: 11/18/19  Time:     Attending Anesthesiologist Anesthesia Assessment:  Agree with above    CAD- stable, pulm- recent URI resolving, chronic mild sob unchanged x years, basia  If BNP ok no further testing required, call primary if URI sx worsen  Airway: upper and lower dentures, OA ext nml, opens well- mal 2  Plan GETA    Reviewed and Signed by PAC Anesthesiologist  Anesthesiologist: Arlene  Date:   Time:   Pass/Fail:   Disposition:     PAC Pharmacist Assessment:        Pharmacist:   Date:   Time:    Sonya Lopez PA-C

## 2019-11-19 ENCOUNTER — TELEPHONE (OUTPATIENT)
Dept: FAMILY MEDICINE | Facility: CLINIC | Age: 69
End: 2019-11-19

## 2019-11-19 ENCOUNTER — TELEPHONE (OUTPATIENT)
Dept: SURGERY | Facility: CLINIC | Age: 69
End: 2019-11-19

## 2019-11-19 NOTE — TELEPHONE ENCOUNTER
11/19/2019       Patient is calling stating she was told to meet with her PCP Sandoval a few days after her Hysterectomy. Sandoval is booking out until 12/26/2019. Patient took an appt with Strong but wanted to know if Sandoval wanted to see her or if she needed to slide into one of Sandoval's schedules? Patient is worried because they gave her instructions to see the PCP and wanted to make sure this is still ok.12/02/2019 is when she would need to be seen.    Patient Ph:[phone]    Narda Hunt -Patient Representative

## 2019-11-20 NOTE — TELEPHONE ENCOUNTER
I am very confident in Strongs ability to see her after the surgery. I can use a same day, if she prefers to see me.

## 2019-11-20 NOTE — TELEPHONE ENCOUNTER
Spoke with patient she will keep appointment with Anny Peterson for now, Dr Nick still has no same days till 12/10 and patient wouldn't want to wait that long when she can see someone sooner.     Told her if anything comes up in Dr Doran schedule ill let her know    Tracy Caldera/ANGELLA

## 2019-11-22 NOTE — NURSING NOTE
Met with pt on 11/12/19 for surgical education.  Pt scheduled for XLAP, hysterectomy, BSO, and possible cancer surgery with Dr Meza.  See pt education for detailed note.  Suyapa Barron RN, BSN, OCN

## 2019-11-26 ENCOUNTER — SURGERY (OUTPATIENT)
Age: 69
End: 2019-11-26
Payer: COMMERCIAL

## 2019-11-26 ENCOUNTER — ANESTHESIA (OUTPATIENT)
Dept: SURGERY | Facility: CLINIC | Age: 69
DRG: 742 | End: 2019-11-26
Payer: COMMERCIAL

## 2019-11-26 ENCOUNTER — HOSPITAL ENCOUNTER (INPATIENT)
Facility: CLINIC | Age: 69
LOS: 2 days | Discharge: HOME | DRG: 742 | End: 2019-11-28
Attending: OBSTETRICS & GYNECOLOGY | Admitting: OBSTETRICS & GYNECOLOGY
Payer: COMMERCIAL

## 2019-11-26 DIAGNOSIS — R19.00 PELVIC MASS: ICD-10-CM

## 2019-11-26 DIAGNOSIS — Z90.710 S/P HYSTERECTOMY: Primary | ICD-10-CM

## 2019-11-26 DIAGNOSIS — N85.2 ENLARGED UTERUS: ICD-10-CM

## 2019-11-26 LAB
ABO + RH BLD: NORMAL
ABO + RH BLD: NORMAL
BLD GP AB SCN SERPL QL: NORMAL
BLOOD BANK CMNT PATIENT-IMP: NORMAL
BLOOD BANK CMNT PATIENT-IMP: NORMAL
GLUCOSE BLDC GLUCOMTR-MCNC: 98 MG/DL (ref 70–99)
SPECIMEN EXP DATE BLD: NORMAL
TROPONIN I SERPL-MCNC: <0.015 UG/L (ref 0–0.04)

## 2019-11-26 PROCEDURE — 36000064 ZZH SURGERY LEVEL 4 EA 15 ADDTL MIN - UMMC: Performed by: OBSTETRICS & GYNECOLOGY

## 2019-11-26 PROCEDURE — 25000132 ZZH RX MED GY IP 250 OP 250 PS 637: Performed by: STUDENT IN AN ORGANIZED HEALTH CARE EDUCATION/TRAINING PROGRAM

## 2019-11-26 PROCEDURE — 88332 PATH CONSLTJ SURG EA ADD BLK: CPT | Performed by: OBSTETRICS & GYNECOLOGY

## 2019-11-26 PROCEDURE — 40000170 ZZH STATISTIC PRE-PROCEDURE ASSESSMENT II: Performed by: OBSTETRICS & GYNECOLOGY

## 2019-11-26 PROCEDURE — 00000155 ZZHCL STATISTIC H-CELL BLOCK W/STAIN: Performed by: OBSTETRICS & GYNECOLOGY

## 2019-11-26 PROCEDURE — 00000146 ZZHCL STATISTIC GLUCOSE BY METER IP

## 2019-11-26 PROCEDURE — 12000001 ZZH R&B MED SURG/OB UMMC

## 2019-11-26 PROCEDURE — 88307 TISSUE EXAM BY PATHOLOGIST: CPT | Performed by: OBSTETRICS & GYNECOLOGY

## 2019-11-26 PROCEDURE — 93010 ELECTROCARDIOGRAM REPORT: CPT | Mod: 59 | Performed by: INTERNAL MEDICINE

## 2019-11-26 PROCEDURE — 88305 TISSUE EXAM BY PATHOLOGIST: CPT | Performed by: OBSTETRICS & GYNECOLOGY

## 2019-11-26 PROCEDURE — 40000275 ZZH STATISTIC RCP TIME EA 10 MIN

## 2019-11-26 PROCEDURE — 58150 TOTAL HYSTERECTOMY: CPT | Mod: GC | Performed by: OBSTETRICS & GYNECOLOGY

## 2019-11-26 PROCEDURE — 36415 COLL VENOUS BLD VENIPUNCTURE: CPT | Performed by: OBSTETRICS & GYNECOLOGY

## 2019-11-26 PROCEDURE — 0UT70ZZ RESECTION OF BILATERAL FALLOPIAN TUBES, OPEN APPROACH: ICD-10-PCS | Performed by: OBSTETRICS & GYNECOLOGY

## 2019-11-26 PROCEDURE — 88309 TISSUE EXAM BY PATHOLOGIST: CPT | Performed by: OBSTETRICS & GYNECOLOGY

## 2019-11-26 PROCEDURE — 0UT20ZZ RESECTION OF BILATERAL OVARIES, OPEN APPROACH: ICD-10-PCS | Performed by: OBSTETRICS & GYNECOLOGY

## 2019-11-26 PROCEDURE — 25000128 H RX IP 250 OP 636: Performed by: OBSTETRICS & GYNECOLOGY

## 2019-11-26 PROCEDURE — 25800030 ZZH RX IP 258 OP 636: Performed by: STUDENT IN AN ORGANIZED HEALTH CARE EDUCATION/TRAINING PROGRAM

## 2019-11-26 PROCEDURE — 27210794 ZZH OR GENERAL SUPPLY STERILE: Performed by: OBSTETRICS & GYNECOLOGY

## 2019-11-26 PROCEDURE — 94640 AIRWAY INHALATION TREATMENT: CPT | Mod: 76

## 2019-11-26 PROCEDURE — 25000125 ZZHC RX 250: Performed by: STUDENT IN AN ORGANIZED HEALTH CARE EDUCATION/TRAINING PROGRAM

## 2019-11-26 PROCEDURE — 36000062 ZZH SURGERY LEVEL 4 1ST 30 MIN - UMMC: Performed by: OBSTETRICS & GYNECOLOGY

## 2019-11-26 PROCEDURE — 71000014 ZZH RECOVERY PHASE 1 LEVEL 2 FIRST HR: Performed by: OBSTETRICS & GYNECOLOGY

## 2019-11-26 PROCEDURE — 88112 CYTOPATH CELL ENHANCE TECH: CPT | Performed by: OBSTETRICS & GYNECOLOGY

## 2019-11-26 PROCEDURE — 84484 ASSAY OF TROPONIN QUANT: CPT | Performed by: OBSTETRICS & GYNECOLOGY

## 2019-11-26 PROCEDURE — 88331 PATH CONSLTJ SURG 1 BLK 1SPC: CPT | Performed by: OBSTETRICS & GYNECOLOGY

## 2019-11-26 PROCEDURE — 37000008 ZZH ANESTHESIA TECHNICAL FEE, 1ST 30 MIN: Performed by: OBSTETRICS & GYNECOLOGY

## 2019-11-26 PROCEDURE — 25000125 ZZHC RX 250

## 2019-11-26 PROCEDURE — 93005 ELECTROCARDIOGRAM TRACING: CPT

## 2019-11-26 PROCEDURE — 25000128 H RX IP 250 OP 636: Performed by: STUDENT IN AN ORGANIZED HEALTH CARE EDUCATION/TRAINING PROGRAM

## 2019-11-26 PROCEDURE — 37000009 ZZH ANESTHESIA TECHNICAL FEE, EACH ADDTL 15 MIN: Performed by: OBSTETRICS & GYNECOLOGY

## 2019-11-26 PROCEDURE — 25000132 ZZH RX MED GY IP 250 OP 250 PS 637: Performed by: OBSTETRICS & GYNECOLOGY

## 2019-11-26 PROCEDURE — 00000160 ZZHCL STATISTIC H-SPECIAL HANDLING: Performed by: OBSTETRICS & GYNECOLOGY

## 2019-11-26 PROCEDURE — 25000566 ZZH SEVOFLURANE, EA 15 MIN: Performed by: OBSTETRICS & GYNECOLOGY

## 2019-11-26 PROCEDURE — 0UT90ZZ RESECTION OF UTERUS, OPEN APPROACH: ICD-10-PCS | Performed by: OBSTETRICS & GYNECOLOGY

## 2019-11-26 RX ORDER — PHENAZOPYRIDINE HYDROCHLORIDE 200 MG/1
200 TABLET, FILM COATED ORAL ONCE
Status: COMPLETED | OUTPATIENT
Start: 2019-11-26 | End: 2019-11-26

## 2019-11-26 RX ORDER — HEPARIN SODIUM 5000 [USP'U]/.5ML
5000 INJECTION, SOLUTION INTRAVENOUS; SUBCUTANEOUS
Status: COMPLETED | OUTPATIENT
Start: 2019-11-26 | End: 2019-11-26

## 2019-11-26 RX ORDER — EPHEDRINE SULFATE 50 MG/ML
INJECTION, SOLUTION INTRAMUSCULAR; INTRAVENOUS; SUBCUTANEOUS PRN
Status: DISCONTINUED | OUTPATIENT
Start: 2019-11-26 | End: 2019-11-26

## 2019-11-26 RX ORDER — DEXAMETHASONE SODIUM PHOSPHATE 4 MG/ML
INJECTION, SOLUTION INTRA-ARTICULAR; INTRALESIONAL; INTRAMUSCULAR; INTRAVENOUS; SOFT TISSUE PRN
Status: DISCONTINUED | OUTPATIENT
Start: 2019-11-26 | End: 2019-11-26

## 2019-11-26 RX ORDER — IPRATROPIUM BROMIDE AND ALBUTEROL SULFATE 2.5; .5 MG/3ML; MG/3ML
3 SOLUTION RESPIRATORY (INHALATION) ONCE
Status: COMPLETED | OUTPATIENT
Start: 2019-11-26 | End: 2019-11-26

## 2019-11-26 RX ORDER — OXYCODONE HYDROCHLORIDE 5 MG/1
5-10 TABLET ORAL EVERY 4 HOURS PRN
Status: DISCONTINUED | OUTPATIENT
Start: 2019-11-26 | End: 2019-11-28 | Stop reason: HOSPADM

## 2019-11-26 RX ORDER — ESOMEPRAZOLE MAGNESIUM 40 MG/1
40 CAPSULE, DELAYED RELEASE ORAL
Status: DISCONTINUED | OUTPATIENT
Start: 2019-11-27 | End: 2019-11-28 | Stop reason: HOSPADM

## 2019-11-26 RX ORDER — NALOXONE HYDROCHLORIDE 0.4 MG/ML
.1-.4 INJECTION, SOLUTION INTRAMUSCULAR; INTRAVENOUS; SUBCUTANEOUS
Status: DISCONTINUED | OUTPATIENT
Start: 2019-11-26 | End: 2019-11-28 | Stop reason: HOSPADM

## 2019-11-26 RX ORDER — ONDANSETRON 4 MG/1
4 TABLET, ORALLY DISINTEGRATING ORAL EVERY 8 HOURS PRN
Status: DISCONTINUED | OUTPATIENT
Start: 2019-11-27 | End: 2019-11-28 | Stop reason: HOSPADM

## 2019-11-26 RX ORDER — METOPROLOL TARTRATE 25 MG/1
25 TABLET, FILM COATED ORAL 2 TIMES DAILY
Status: DISCONTINUED | OUTPATIENT
Start: 2019-11-26 | End: 2019-11-28 | Stop reason: HOSPADM

## 2019-11-26 RX ORDER — SODIUM CHLORIDE, SODIUM LACTATE, POTASSIUM CHLORIDE, CALCIUM CHLORIDE 600; 310; 30; 20 MG/100ML; MG/100ML; MG/100ML; MG/100ML
INJECTION, SOLUTION INTRAVENOUS CONTINUOUS
Status: DISCONTINUED | OUTPATIENT
Start: 2019-11-26 | End: 2019-11-27

## 2019-11-26 RX ORDER — ALBUTEROL SULFATE 0.83 MG/ML
2.5 SOLUTION RESPIRATORY (INHALATION) EVERY 6 HOURS PRN
Status: DISCONTINUED | OUTPATIENT
Start: 2019-11-26 | End: 2019-11-28 | Stop reason: HOSPADM

## 2019-11-26 RX ORDER — HEPARIN SODIUM 1000 [USP'U]/ML
INJECTION, SOLUTION INTRAVENOUS; SUBCUTANEOUS PRN
Status: DISCONTINUED | OUTPATIENT
Start: 2019-11-26 | End: 2019-11-26 | Stop reason: HOSPADM

## 2019-11-26 RX ORDER — CALCIUM CARBONATE 500 MG/1
1000 TABLET, CHEWABLE ORAL 4 TIMES DAILY PRN
Status: DISCONTINUED | OUTPATIENT
Start: 2019-11-26 | End: 2019-11-28 | Stop reason: HOSPADM

## 2019-11-26 RX ORDER — SIMETHICONE 80 MG
80 TABLET,CHEWABLE ORAL 4 TIMES DAILY PRN
Status: DISCONTINUED | OUTPATIENT
Start: 2019-11-26 | End: 2019-11-28 | Stop reason: HOSPADM

## 2019-11-26 RX ORDER — ACETAMINOPHEN 325 MG/1
975 TABLET ORAL ONCE
Status: COMPLETED | OUTPATIENT
Start: 2019-11-26 | End: 2019-11-26

## 2019-11-26 RX ORDER — ALBUTEROL SULFATE 90 UG/1
2 AEROSOL, METERED RESPIRATORY (INHALATION) EVERY 6 HOURS PRN
Status: DISCONTINUED | OUTPATIENT
Start: 2019-11-26 | End: 2019-11-28 | Stop reason: HOSPADM

## 2019-11-26 RX ORDER — HYDROMORPHONE HCL/0.9% NACL/PF 0.2MG/0.2
0.2 SYRINGE (ML) INTRAVENOUS
Status: DISCONTINUED | OUTPATIENT
Start: 2019-11-26 | End: 2019-11-28 | Stop reason: HOSPADM

## 2019-11-26 RX ORDER — DIPHENHYDRAMINE HCL 12.5MG/5ML
12.5 LIQUID (ML) ORAL EVERY 6 HOURS PRN
Status: DISCONTINUED | OUTPATIENT
Start: 2019-11-26 | End: 2019-11-28 | Stop reason: HOSPADM

## 2019-11-26 RX ORDER — AMOXICILLIN 250 MG
1 CAPSULE ORAL 2 TIMES DAILY PRN
Status: DISCONTINUED | OUTPATIENT
Start: 2019-11-26 | End: 2019-11-28 | Stop reason: HOSPADM

## 2019-11-26 RX ORDER — FENTANYL CITRATE 50 UG/ML
25-50 INJECTION, SOLUTION INTRAMUSCULAR; INTRAVENOUS EVERY 5 MIN PRN
Status: DISCONTINUED | OUTPATIENT
Start: 2019-11-26 | End: 2019-11-26 | Stop reason: HOSPADM

## 2019-11-26 RX ORDER — SODIUM CHLORIDE, SODIUM LACTATE, POTASSIUM CHLORIDE, CALCIUM CHLORIDE 600; 310; 30; 20 MG/100ML; MG/100ML; MG/100ML; MG/100ML
INJECTION, SOLUTION INTRAVENOUS CONTINUOUS
Status: DISCONTINUED | OUTPATIENT
Start: 2019-11-26 | End: 2019-11-26 | Stop reason: HOSPADM

## 2019-11-26 RX ORDER — DIPHENHYDRAMINE HYDROCHLORIDE 50 MG/ML
12.5 INJECTION INTRAMUSCULAR; INTRAVENOUS EVERY 6 HOURS PRN
Status: DISCONTINUED | OUTPATIENT
Start: 2019-11-26 | End: 2019-11-28 | Stop reason: HOSPADM

## 2019-11-26 RX ORDER — ONDANSETRON 4 MG/1
4 TABLET, ORALLY DISINTEGRATING ORAL EVERY 8 HOURS
Status: DISPENSED | OUTPATIENT
Start: 2019-11-26 | End: 2019-11-27

## 2019-11-26 RX ORDER — NALOXONE HYDROCHLORIDE 0.4 MG/ML
.1-.4 INJECTION, SOLUTION INTRAMUSCULAR; INTRAVENOUS; SUBCUTANEOUS
Status: ACTIVE | OUTPATIENT
Start: 2019-11-26 | End: 2019-11-27

## 2019-11-26 RX ORDER — BISACODYL 10 MG
10 SUPPOSITORY, RECTAL RECTAL DAILY
Status: DISCONTINUED | OUTPATIENT
Start: 2019-11-26 | End: 2019-11-28 | Stop reason: HOSPADM

## 2019-11-26 RX ORDER — FENTANYL CITRATE 50 UG/ML
INJECTION, SOLUTION INTRAMUSCULAR; INTRAVENOUS PRN
Status: DISCONTINUED | OUTPATIENT
Start: 2019-11-26 | End: 2019-11-26

## 2019-11-26 RX ORDER — SODIUM CHLORIDE, SODIUM LACTATE, POTASSIUM CHLORIDE, CALCIUM CHLORIDE 600; 310; 30; 20 MG/100ML; MG/100ML; MG/100ML; MG/100ML
INJECTION, SOLUTION INTRAVENOUS CONTINUOUS PRN
Status: DISCONTINUED | OUTPATIENT
Start: 2019-11-26 | End: 2019-11-26

## 2019-11-26 RX ORDER — ROSUVASTATIN CALCIUM 40 MG/1
40 TABLET, COATED ORAL AT BEDTIME
Status: DISCONTINUED | OUTPATIENT
Start: 2019-11-26 | End: 2019-11-28 | Stop reason: HOSPADM

## 2019-11-26 RX ORDER — ONDANSETRON 4 MG/1
4 TABLET, ORALLY DISINTEGRATING ORAL EVERY 30 MIN PRN
Status: DISCONTINUED | OUTPATIENT
Start: 2019-11-26 | End: 2019-11-26 | Stop reason: HOSPADM

## 2019-11-26 RX ORDER — LIDOCAINE HYDROCHLORIDE 20 MG/ML
INJECTION, SOLUTION INFILTRATION; PERINEURAL PRN
Status: DISCONTINUED | OUTPATIENT
Start: 2019-11-26 | End: 2019-11-26

## 2019-11-26 RX ORDER — PROPOFOL 10 MG/ML
INJECTION, EMULSION INTRAVENOUS PRN
Status: DISCONTINUED | OUTPATIENT
Start: 2019-11-26 | End: 2019-11-26

## 2019-11-26 RX ORDER — AMOXICILLIN 250 MG
2 CAPSULE ORAL 2 TIMES DAILY PRN
Status: DISCONTINUED | OUTPATIENT
Start: 2019-11-26 | End: 2019-11-28 | Stop reason: HOSPADM

## 2019-11-26 RX ORDER — ONDANSETRON 2 MG/ML
INJECTION INTRAMUSCULAR; INTRAVENOUS PRN
Status: DISCONTINUED | OUTPATIENT
Start: 2019-11-26 | End: 2019-11-26

## 2019-11-26 RX ORDER — CEFAZOLIN SODIUM 2 G/100ML
2 INJECTION, SOLUTION INTRAVENOUS
Status: COMPLETED | OUTPATIENT
Start: 2019-11-26 | End: 2019-11-26

## 2019-11-26 RX ORDER — ACETAMINOPHEN 325 MG/1
650 TABLET ORAL EVERY 6 HOURS
Status: DISCONTINUED | OUTPATIENT
Start: 2019-11-26 | End: 2019-11-28 | Stop reason: HOSPADM

## 2019-11-26 RX ORDER — GABAPENTIN 300 MG/1
300 CAPSULE ORAL ONCE
Status: COMPLETED | OUTPATIENT
Start: 2019-11-26 | End: 2019-11-26

## 2019-11-26 RX ORDER — LIDOCAINE 40 MG/G
CREAM TOPICAL
Status: DISCONTINUED | OUTPATIENT
Start: 2019-11-26 | End: 2019-11-28 | Stop reason: HOSPADM

## 2019-11-26 RX ORDER — CEFAZOLIN SODIUM 1 G/3ML
1 INJECTION, POWDER, FOR SOLUTION INTRAMUSCULAR; INTRAVENOUS SEE ADMIN INSTRUCTIONS
Status: DISCONTINUED | OUTPATIENT
Start: 2019-11-26 | End: 2019-11-26 | Stop reason: HOSPADM

## 2019-11-26 RX ORDER — HYDROMORPHONE HYDROCHLORIDE 1 MG/ML
.3-.5 INJECTION, SOLUTION INTRAMUSCULAR; INTRAVENOUS; SUBCUTANEOUS EVERY 5 MIN PRN
Status: DISCONTINUED | OUTPATIENT
Start: 2019-11-26 | End: 2019-11-26 | Stop reason: HOSPADM

## 2019-11-26 RX ORDER — ONDANSETRON 2 MG/ML
4 INJECTION INTRAMUSCULAR; INTRAVENOUS EVERY 30 MIN PRN
Status: DISCONTINUED | OUTPATIENT
Start: 2019-11-26 | End: 2019-11-26 | Stop reason: HOSPADM

## 2019-11-26 RX ADMIN — FENTANYL CITRATE 50 MCG: 50 INJECTION, SOLUTION INTRAMUSCULAR; INTRAVENOUS at 08:29

## 2019-11-26 RX ADMIN — GABAPENTIN 300 MG: 300 CAPSULE ORAL at 06:09

## 2019-11-26 RX ADMIN — METOPROLOL TARTRATE 25 MG: 25 TABLET ORAL at 19:31

## 2019-11-26 RX ADMIN — ALBUTEROL SULFATE 2.5 MG: 2.5 SOLUTION RESPIRATORY (INHALATION) at 18:51

## 2019-11-26 RX ADMIN — ONDANSETRON 4 MG: 4 TABLET, ORALLY DISINTEGRATING ORAL at 17:54

## 2019-11-26 RX ADMIN — OXYCODONE HYDROCHLORIDE 5 MG: 5 TABLET ORAL at 21:33

## 2019-11-26 RX ADMIN — SODIUM CHLORIDE, POTASSIUM CHLORIDE, SODIUM LACTATE AND CALCIUM CHLORIDE: 600; 310; 30; 20 INJECTION, SOLUTION INTRAVENOUS at 12:44

## 2019-11-26 RX ADMIN — ROCURONIUM BROMIDE 10 MG: 10 INJECTION INTRAVENOUS at 09:49

## 2019-11-26 RX ADMIN — HYDROMORPHONE HYDROCHLORIDE 0.5 MG: 1 INJECTION, SOLUTION INTRAMUSCULAR; INTRAVENOUS; SUBCUTANEOUS at 09:23

## 2019-11-26 RX ADMIN — Medication 5 MG: at 08:22

## 2019-11-26 RX ADMIN — Medication 5 MG: at 09:23

## 2019-11-26 RX ADMIN — ROCURONIUM BROMIDE 50 MG: 10 INJECTION INTRAVENOUS at 08:06

## 2019-11-26 RX ADMIN — SUGAMMADEX 200 MG: 100 INJECTION, SOLUTION INTRAVENOUS at 10:10

## 2019-11-26 RX ADMIN — OXYCODONE HYDROCHLORIDE 5 MG: 5 TABLET ORAL at 16:16

## 2019-11-26 RX ADMIN — OXYCODONE HYDROCHLORIDE 5 MG: 5 TABLET ORAL at 14:10

## 2019-11-26 RX ADMIN — HEPARIN SODIUM 5000 UNITS: 5000 INJECTION, SOLUTION INTRAVENOUS; SUBCUTANEOUS at 06:09

## 2019-11-26 RX ADMIN — ACETAMINOPHEN 650 MG: 325 TABLET, FILM COATED ORAL at 17:54

## 2019-11-26 RX ADMIN — DEXAMETHASONE SODIUM PHOSPHATE 8 MG: 4 INJECTION, SOLUTION INTRA-ARTICULAR; INTRALESIONAL; INTRAMUSCULAR; INTRAVENOUS; SOFT TISSUE at 08:15

## 2019-11-26 RX ADMIN — FENTANYL CITRATE 100 MCG: 50 INJECTION, SOLUTION INTRAMUSCULAR; INTRAVENOUS at 08:05

## 2019-11-26 RX ADMIN — LIDOCAINE HYDROCHLORIDE 100 MG: 20 INJECTION, SOLUTION INFILTRATION; PERINEURAL at 08:06

## 2019-11-26 RX ADMIN — CEFAZOLIN SODIUM 2 G: 2 INJECTION, SOLUTION INTRAVENOUS at 08:12

## 2019-11-26 RX ADMIN — PHENAZOPYRIDINE HYDROCHLORIDE 200 MG: 200 TABLET ORAL at 06:09

## 2019-11-26 RX ADMIN — Medication 5 MG: at 08:08

## 2019-11-26 RX ADMIN — ROSUVASTATIN CALCIUM 40 MG: 40 TABLET, FILM COATED ORAL at 21:26

## 2019-11-26 RX ADMIN — SODIUM CHLORIDE, POTASSIUM CHLORIDE, SODIUM LACTATE AND CALCIUM CHLORIDE: 600; 310; 30; 20 INJECTION, SOLUTION INTRAVENOUS at 07:58

## 2019-11-26 RX ADMIN — HEPARIN SODIUM 1000 UNITS: 1000 INJECTION, SOLUTION INTRAVENOUS; SUBCUTANEOUS at 08:52

## 2019-11-26 RX ADMIN — IPRATROPIUM BROMIDE AND ALBUTEROL SULFATE 3 ML: .5; 3 SOLUTION RESPIRATORY (INHALATION) at 07:45

## 2019-11-26 RX ADMIN — ACETAMINOPHEN 975 MG: 325 TABLET, FILM COATED ORAL at 06:09

## 2019-11-26 RX ADMIN — MAGNESIUM HYDROXIDE 15 ML: 400 SUSPENSION ORAL at 17:54

## 2019-11-26 RX ADMIN — PROPOFOL 200 MG: 10 INJECTION, EMULSION INTRAVENOUS at 08:06

## 2019-11-26 RX ADMIN — ACETAMINOPHEN 650 MG: 325 TABLET, FILM COATED ORAL at 12:51

## 2019-11-26 RX ADMIN — ONDANSETRON 4 MG: 2 INJECTION INTRAMUSCULAR; INTRAVENOUS at 09:57

## 2019-11-26 RX ADMIN — ROCURONIUM BROMIDE 20 MG: 10 INJECTION INTRAVENOUS at 08:56

## 2019-11-26 ASSESSMENT — ACTIVITIES OF DAILY LIVING (ADL)
ADLS_ACUITY_SCORE: 16
RETIRED_COMMUNICATION: 0-->UNDERSTANDS/COMMUNICATES WITHOUT DIFFICULTY
BATHING: 0-->INDEPENDENT
COGNITION: 0 - NO COGNITION ISSUES REPORTED
ADLS_ACUITY_SCORE: 15
TOILETING: 0-->INDEPENDENT
SWALLOWING: 0-->SWALLOWS FOODS/LIQUIDS WITHOUT DIFFICULTY
AMBULATION: 0-->INDEPENDENT
FALL_HISTORY_WITHIN_LAST_SIX_MONTHS: NO
DRESS: 0-->INDEPENDENT
TRANSFERRING: 0-->INDEPENDENT
RETIRED_EATING: 0-->INDEPENDENT

## 2019-11-26 ASSESSMENT — PAIN DESCRIPTION - DESCRIPTORS
DESCRIPTORS: ACHING
DESCRIPTORS: SORE
DESCRIPTORS: ACHING

## 2019-11-26 ASSESSMENT — MIFFLIN-ST. JEOR: SCORE: 1724.75

## 2019-11-26 NOTE — BRIEF OP NOTE
[facility], [place]    Brief Operative Note    Pre-operative diagnosis: Pelvic mass [R19.00]  Enlarged uterus [N85.2]  Post-operative diagnosis Same as pre-operative diagnosis    Procedure: Procedure(s):  Exploratory laparotomy, removal of uterus, cervix, both ovaries and fallopian tubes  Surgeon: Surgeon(s) and Role:     * Natalia Us MD - Primary     * Teresa Macias MD - Resident - Assisting     * Flory Olivarez MD - Fellow - Assisting  Anesthesia: General   Estimated blood loss: 300 ml  Drains: Sanderson   Specimens:   ID Type Source Tests Collected by Time Destination   1 : pelvic washings Washings Pelvis CYTOLOGY NON GYN Natalia Us MD 11/26/2019  8:51 AM    A : Left fallopian tube and ovary Tissue Fallopian Tube and Ovary, Left SURGICAL PATHOLOGY EXAM Natalia Us MD 11/26/2019  8:45 AM    B : Uterus, Cervix, Right Fallopian Tube, Right Ovary  Tissue Uterus and Cervix SURGICAL PATHOLOGY EXAM Natalia Us MD 11/26/2019  8:58 AM      Findings:   EUA showed enlarged mobile uterus. On laparotomy, irregular enlarged but smooth uterus. Left ovarian cyst approximately 5cm. Normal right ovary. Right mesosalpinx with gelatinous material. Normal appendix. Normal bowel. No evidence of metastatic disease to upper abdomen. Benign pathology on frozen section of uterus and left ovarian cyst. Hemostatic at end of case.   Complications: None.  Implants: * No implants in log *    Teresa Macias MD  OB/Gyn PGY-3  11/26/2019

## 2019-11-26 NOTE — DISCHARGE SUMMARY
Gynecologic Oncology Discharge Summary    Calista Atknison  [id number]    Admit Date: 11/26/2019  Discharge Date: 11/28/19   Admitting Provider: Natalia Meza MD   Discharge Provider: Nataila Meza MD    Admission Dx:   - Pelvic mass  - HTN  - GERD  - CAD  - Hx MI  - COPD  - Obesity  - Hyperlipidemia   - depression    Discharge Dx:  - Benign pelvic mass  - HTN  - GERD  - CAD  - Hx MI  - COPD  - Obesity  - Hyperlipidemia   - depression    Procedures: Total abdominal hysterectomy, bilateral salpingo-oophorectomy     Prior to Admission Medications:  Medications Prior to Admission   Medication Sig Dispense Refill Last Dose     albuterol (2.5 MG/3ML) 0.083% neb solution Take 1 vial (2.5 mg) by nebulization every 6 hours as needed for shortness of breath / dyspnea or wheezing (Patient taking differently: Take 1 vial by nebulization every 6 hours as needed for shortness of breath / dyspnea or wheezing (Pt last used 11/17/19 in the ER. 11/18/19) ) 360 mL 0 11/25/2019 at 2000     azithromycin (ZITHROMAX) 250 MG tablet Take 250 mg by mouth every morning Pt is currently on 2nd day of treatment plan 11/18/19   Past Week at Unknown time     benzonatate (TESSALON) 100 MG capsule Take 100 mg by mouth 3 times daily as needed for cough   Past Week at Unknown time     esomeprazole (NEXIUM) 40 MG DR capsule Take 1 capsule (40 mg) by mouth every morning (before breakfast) Take 30-60 minutes before eating. 30 capsule 0 11/26/2019 at 0400     metoprolol tartrate (LOPRESSOR) 25 MG tablet Take 1 tablet (25 mg) by mouth 2 times daily 180 tablet 1 11/26/2019 at 0400     rosuvastatin (CRESTOR) 40 MG tablet Take 1 tablet (40 mg) by mouth daily (Patient taking differently: Take 40 mg by mouth At Bedtime ) 90 tablet 3 11/25/2019 at 2000     acetaminophen (TYLENOL) 500 MG tablet Take 1,000 mg by mouth At Bedtime   11/24/2019     albuterol (PROAIR HFA/PROVENTIL HFA/VENTOLIN HFA) 108 (90 BASE) MCG/ACT Inhaler Inhale 2 puffs into the lungs every 6  hours as needed for shortness of breath / dyspnea or wheezing 1 Inhaler 3 11/22/2019     aspirin 81 MG EC tablet Take 81 mg by mouth every morning    11/18/19     ipratropium (ATROVENT HFA) 17 MCG/ACT inhaler Inhale 2 puffs into the lungs every 6 hours Pt last used 11/18/19   More than a month at Unknown time       Discharge Medications:     Review of your medicines      START taking      Dose / Directions   ibuprofen 600 MG tablet  Commonly known as:  ADVIL/MOTRIN      Dose:  600 mg  Take 1 tablet (600 mg) by mouth every 6 hours as needed for moderate pain  Quantity:  12 tablet  Refills:  0     oxyCODONE 5 MG tablet  Commonly known as:  ROXICODONE      Dose:  5 mg  Take 1 tablet (5 mg) by mouth every 6 hours as needed for pain (moderate to severe)  Quantity:  10 tablet  Refills:  0        CONTINUE these medicines which may have CHANGED, or have new prescriptions. If we are uncertain of the size of tablets/capsules you have at home, strength may be listed as something that might have changed.      Dose / Directions   * acetaminophen 500 MG tablet  Commonly known as:  TYLENOL  This may have changed:  Another medication with the same name was added. Make sure you understand how and when to take each.      Dose:  1,000 mg  Take 1,000 mg by mouth At Bedtime  Refills:  0     * acetaminophen 325 MG tablet  Commonly known as:  TYLENOL  This may have changed:  You were already taking a medication with the same name, and this prescription was added. Make sure you understand how and when to take each.      Dose:  650 mg  Take 2 tablets (650 mg) by mouth every 6 hours as needed for mild pain  Quantity:  24 tablet  Refills:  0     * albuterol (2.5 MG/3ML) 0.083% neb solution  Commonly known as:  PROVENTIL  This may have changed:  reasons to take this  Used for:  Chronic obstructive pulmonary disease, unspecified COPD type (H)      Dose:  1 vial  Take 1 vial (2.5 mg) by nebulization every 6 hours as needed for shortness of  breath / dyspnea or wheezing  Quantity:  360 mL  Refills:  0     * albuterol 108 (90 Base) MCG/ACT inhaler  Commonly known as:  PROAIR HFA/PROVENTIL HFA/VENTOLIN HFA  This may have changed:  Another medication with the same name was changed. Make sure you understand how and when to take each.  Used for:  Chronic obstructive pulmonary disease, unspecified COPD type (H)      Dose:  2 puff  Inhale 2 puffs into the lungs every 6 hours as needed for shortness of breath / dyspnea or wheezing  Quantity:  1 Inhaler  Refills:  3     rosuvastatin 40 MG tablet  Commonly known as:  CRESTOR  This may have changed:  when to take this  Used for:  Mixed hyperlipidemia      Dose:  40 mg  Take 1 tablet (40 mg) by mouth daily  Quantity:  90 tablet  Refills:  3         * This list has 4 medication(s) that are the same as other medications prescribed for you. Read the directions carefully, and ask your doctor or other care provider to review them with you.            CONTINUE these medicines which have NOT CHANGED      Dose / Directions   aspirin 81 MG EC tablet      Dose:  81 mg  Take 81 mg by mouth every morning  Refills:  0     azithromycin 250 MG tablet  Commonly known as:  ZITHROMAX      Dose:  250 mg  Take 250 mg by mouth every morning Pt is currently on 2nd day of treatment plan 11/18/19  Refills:  0     benzonatate 100 MG capsule  Commonly known as:  TESSALON      Dose:  100 mg  Take 100 mg by mouth 3 times daily as needed for cough  Refills:  0     esomeprazole 40 MG DR capsule  Commonly known as:  nexIUM  Used for:  Gastroesophageal reflux disease, esophagitis presence not specified      Dose:  40 mg  Take 1 capsule (40 mg) by mouth every morning (before breakfast) Take 30-60 minutes before eating.  Quantity:  30 capsule  Refills:  0     ipratropium 17 MCG/ACT inhaler  Commonly known as:  ATROVENT HFA      Dose:  2 puff  Inhale 2 puffs into the lungs every 6 hours Pt last used 11/18/19  Refills:  0     metoprolol tartrate 25  MG tablet  Commonly known as:  LOPRESSOR  Used for:  Essential hypertension      Dose:  25 mg  Take 1 tablet (25 mg) by mouth 2 times daily  Quantity:  180 tablet  Refills:  1           Where to get your medicines      These medications were sent to [facility] - [place], [place] - [address]  [address], [facility] [place] [facility] [address]    Phone:  [phone]     acetaminophen 325 MG tablet    ibuprofen 600 MG tablet     Some of these will need a paper prescription and others can be bought over the counter. Ask your nurse if you have questions.    Bring a paper prescription for each of these medications    oxyCODONE 5 MG tablet         Consultations:  None    Brief History of Illness:  Calista Atkinson is a 68yo who presented to clinic with a pelvic mass and postmenopausal bleeding. She was recommended to undergo the above procedures.     Hospital Course:  Dz:   - Preoperative diagnosis was pelvic mass.  Frozen section at the time of the surgery showed benign pathology.  Final pathology is pending at the time of discharge.  She will follow-up postoperatively for a care plan.  FEN:   - She was maintained on IVF until POD#1, when her diet was slowly advanced.  By discharge, she was tolerating a regular diet without nausea and vomiting and able to maintain her hydration without IVF supplementation.  Pain:   - Her pain was initially controlled with IV and PO medications.  Once tolerating PO pain meds, she was transitioned to a PO pain regimen.  Her pain was well controlled on this and she was discharged home with these medications.  CV:   - She has a history of HTN, CAD and MI in 2012 s/p stent. She was continued on her metoprolol. Her vital signs were stable while in house and she had no acute CV issues.  PULM:   - She has a history of COPD but does not use inhalers regularly.  She was initially given O2 supplementation in to maintain her O2 sats in the immediate postop period and was transitioned  off of this without difficulty.  By discharge, her O2 sats were greater than 94% on RA.  She was encouraged to use her bedside IS while in house.  She had no acute pulmonary issues while in house.  HEME:   - Her preoperative Hgb was 14.7.  Her hgb dropped to 13.3 postoperatively.  She had no other acute heme issues while in house.  GI:   - She was made NPO prior to the procedure.  On POD#0, her diet was advanced slowly as tolerated.  At the time of discharge, she was tolerating a regular diet without nausea and vomiting.  She will be discharged with a bowel regimen to prevent constipation in the postoperative period.  She had no acute GI issues while in house.  :    -  A sanchez catheter was placed at the time of the surgery.  Once ambulating unassisted, the sanchez catheter was removed.  Prior to discharge, the patient was voiding spontaneously without difficulty.  She had no acute  issues while in house.  ID:   - The patient was AF during her hospitalization.  She received standard preoperative antibiotics without incident.    ENDO:   - No issues  PSYCH/NEURO:   - Hx TIA and seasonal depression. No acute issues while in house.  PPX:    -  She was given SCDs, IS, and lovenox during her hospital course.  She tolerated these prophylactic interventions without incident.  They were discontinued at the time of her discharge.      Discharge Instructions and Follow up:  Ms. Calista Atkinson was discharged from the hospital with follow up with Dr. Meza on 12/10.    Discharge Diet: Regular  Discharge Activity: Activity as tolerated  Discharge Follow up: Dr. Meza 12/10/19    Discharge Disposition:  Discharged to home    Discharge Staff: MD Elisha Cunningham MD PhD  Ob/Gyn PGY-4  11/28/2019 10:30 AM    Natalia Meza MD  Gynecologic Oncology  [facility]

## 2019-11-26 NOTE — PLAN OF CARE
Writer assumed care of patient from [id number]. Arrived from PACU @ 1230.    AVSS on 3L NC, HTN at baseline. Capnography in place. Extensive cardiac Hx. Lethargic, oriented x4. Dangled and stood, due to walk at least 1x this evening. Hypoactive BS/denies passing flatus. No BM this shift. Denies N/V. Tolerating sips of water. Scant vaginal bleeding present- noemy pad changed upon arrival. Left PIV infusing IVMF. Left PIV saline locked. Midline incision w/ abd pad and abd binder in place. Sanderson patent w/ adequate, orange urine output. Abd incisional pain adequately managed w/ scheduled Tylenol and PRN PO Oxycodone 5mg. Daughter at bedside, paper copy of advanced care directive placed in paper chart.     Admitted/transferred from:   2 RN full   skin assessment completed by Bushra Aly, RN and Kendy Paez RN.  Skin assessment finding: issues found minor area of redness under left breast and in gluteal cleft. Abd incision WNL, Left PIVs x2.    Interventions/actions: Interdry fabric applied to reddened area under breast.     Bushra Aly, RN on 11/26/2019 at 2:15 PM

## 2019-11-26 NOTE — OP NOTE
Gynecologic Oncology Operative Report    Calista Atkinson  [id number]  11/26/2019    Pre-operative Diagnosis:  Enlarged uterus, fibroid uterus, elevated     Post-operative Diagnosis:  Benign on frozen section, final pathology pending    Surgeon:  Natalia Meza MD    Assistants:  Teresa Macias MD, PGY-3, Momo Olivarez MD, Fellow    Anesthesia:  General endotracheal     Procedure:  Exploratory laparotomy, total abdominal hysterectomy, bilateral salpingo-oophorectomy    EBL:  300 cc    IVF:  1000 cc crystalloid    UOP:  95 cc clear yellow urine    Specimens:  1.  Pelvic washings  2.  Left ovary and fallopian tubes  3.  Uterus, cervix, right ovary and fallopian tube    Complications:  None apparent    Indications for procedure:  Calista Atkinson is a 69 year old who initially presented with abdominal bloating and discomfort.  She underwent a CT that showed a markedly enlarged uterus and left ovarian mass measuring 6.6 cm.  An EMB was obtained and was acellular.  A  was obtained and was elevated at 56.  Following a thorough discussion of the risks, benefits, indications and alternatives she consented to the above procedure.    Operative Findings:  EUA revealed normal external genitalia and an enlarged uterus palpable to the level of the umbilicus.  On laparotomy there was an enlarged uterus with several leiomyoma, however there was one dominant fundal leiomyoma. The right ovary and fallopian tube were grossly normal, however there was cystic dilation of the IP ligament.  The remainder of the abdominal and pelvic surveys were unremarkable.   Frozen section analysis was consistent with benign findings.    Operative Procedure:  The consent was reviewed with the patient in the preoperative setting. She received prophylactic antibiotics. In addition, she received heparin for venous thrombosis prevention.  She was subsequently transferred to the operating room for the above-mentioned procedure. The patient was placed in  dorsal lithotomy position. General anesthetic was obtained without noted difficulties. Sanderson catheter was placed under sterile techniques. The patient was then prepped and draped for an abdominal procedure. Timeout was called at which point the patient's name, operative procedure and site was confirmed by the operative team. A midline vertical skin incision was then made from the level of pubic symphysis and ultimately extending to the umbilicus. Incision was performed sharply and carried through the subcutaneous layer with cautery. Fascia was incised and extended superiorly and inferiorly. Peritoneum was then elevated and entered sharply. Peritoneal incision was extended without any injury to nearby structures. At this point, exploration of the pelvis and upper abdomen was performed. The Bookwalter retractor was positioned and utilized throughout the course of the procedure. Pelvic washings were next obtained and sent off to cytology.  The entirety of the bowels were run without evidence of disease including the appendix which was grossly normal.  Bowels were packed up into the upper abdomen and appropriate retractors positioned.  Initially on the left side, the round ligament was isolated, transected with cautery and then suture ligated.  We extended the peritoneal incision of the left aspect of the psoas muscle and the left retroperitoneal space was entered.  The left ureter was identified deep in the pelvis and a window was made in the medial leaf of the broad ligament ensuring to stay well above the level of the ureter.  The left ovarian vessels were then isolated, doubly clamped, cut and suture ligated.  The left utero-ovarian ligament was then isolated, doubly clamped, cut and suture ligated and the left tube and ovary were removed and sent for frozen pathology which did return abenign.    Attention was then turned to the right and the round ligament was isolated, transected with cautery and suture ligated.  We extended the peritoneal incision over the right aspect of the psoas muscle. Right retroperitoneal exploration was performed. We identified the ureter deep in the pelvis. Well above that site, a window was made in the medial leaf of the broad ligament and the right ovarian vessels were then isolated, clamped x2, cut and suture ligated. The peritoneal incision over the lower uterine segment was incised. The vesicouterine peritoneum was then fully developed. The bladder was retracted to the level of the upper vagina. Bilateral uterine arteries were now isolated and then clamped in a serial manner. We then cut and suture ligated with Vicryl.  The bilateral cardinal ligaments were next clamped and cut and suture ligated with Vicryl. We proceeded until we could isolate out the uterosacral ligaments, which were similarly cut and suture ligated.  The apex of the vagina was then clamped and the get scissors was used to perform the colpotomy and the full cervix was resected along with the uterus and the right ovary and fallopian tube. The vaginal cuff was now grasped and suture ligated with interrupted figure of eight Vicryl sutures with excellent reapproximation and good hemostasis. At this point, the pelvis was hemostatic.       At this point, we performed irrigation of the pelvis and upper abdomen with 4 liters of warm saline. All laparotomy sponges were next removed. Fascia was closed with 0 looped PDS in 2 segments meeting in the middle. Subcutaneous tissue was reapproximated and skin closed with two running subcuticular sutures meeting in the middle and Dermabond applied.    Sponge, lap, instrument and needle counts were reported as correct x2. The patient was then repositioned appropriately, recalled from the general anesthetic and was transferred to recovery unit in stable condition.       Natalia Meza MD  Gynecologic Oncology  [facility]

## 2019-11-26 NOTE — ANESTHESIA POSTPROCEDURE EVALUATION
Anesthesia POST Procedure Evaluation    Patient: Calista Atkinson   MRN:     [id number] Gender:   female   Age:    69 year old :      1950        Preoperative Diagnosis: Pelvic mass [R19.00]  Enlarged uterus [N85.2]   Procedure(s):  Exploratory laparotomy, removal of uterus, cervix, both ovaries and fallopian tubes   Postop Comments: No value filed.       Anesthesia Type:  Not documented  General    Reportable Event: NO     PAIN: Uncomplicated   Sign Out status: Comfortable, Well controlled pain     PONV: No PONV   Sign Out status:  No Nausea or Vomiting     Neuro/Psych: Uneventful perioperative course   Sign Out Status: Preoperative baseline; Age appropriate mentation     Airway/Resp.: Uneventful perioperative course   Sign Out Status: Non labored breathing, age appropriate RR; Resp. Status within EXPECTED Parameters     CV: Uneventful perioperative course   Sign Out status: Appropriate BP and perfusion indices; Appropriate HR/Rhythm     Disposition:   Sign Out in:  Phase II  Disposition:  Home  Recovery Course: Uneventful  Follow-Up: Not required           Last Anesthesia Record Vitals:  CRNA VITALS  2019 1000 - 2019 1100      2019             Pulse:  72    SpO2:  100 %    Resp Rate (set):  10          Last PACU Vitals:  Vitals Value Taken Time   /55 2019 11:50 AM   Temp 36.8  C (98.2  F) 2019 11:45 AM   Pulse 74 2019 11:50 AM   Resp 18 2019 11:54 AM   SpO2 96 % 2019 11:55 AM   Temp src     NIBP     Pulse     SpO2     Resp     Temp     Ht Rate     Temp 2     Vitals shown include unvalidated device data.      Electronically Signed By: Cassius Perez MD, 2019, 12:23 PM

## 2019-11-26 NOTE — PROGRESS NOTES
Gynecology Oncology Post-op check    Ms. Calista Atkinson is a 69 year old POD# 0 s/p BYRON, BSO for pelvic mass.     Dz: Pelvic mass, benign on frozen     Subjective: Patient is feeling well.  Pain is minimal right now. No f/c/cp/sob/n/v. Hasn't had anything to drink yet. Tired.      Objective:   Vitals:    11/26/19 1115 11/26/19 1130 11/26/19 1145 11/26/19 1218   BP: 133/64 123/64 129/55    Pulse:  72  (P) 79   Resp: 13 21 16 (P) 19   Temp:   98.2  F (36.8  C) (P) 96.5  F (35.8  C)   TempSrc:   Oral (P) Oral   SpO2: 94% 94% 95% (P) 90%   Weight:       Height:             General: in NAD  CV: RRR, no m/r/g  Resp: CTAB  Abdomen: soft, nontender, nondistended  Incision: VML covered in dermabond with abd pad over it   Extremities: nontender, trace edema, SCDs in place      Assessment:   Ms. Calista Atkinson is a 69 year old POD# 0 s/p BYRON, BSO for pelvic mass.     Active Problem list:  HTN  GERD  CAD  COPD  Obesity  Hyperlipidemia  Seasonal depression     Plan:    Disease: Pelvic mass - benign on frozen pathology   FEN: Reg diet, LR 125cc/h   Pain: Tylenol, oxycodone, dilaudid prn   Heme: Hgb 14.7>> AM labs   CV: Hx MI, CAD s/p stent in 2012- aspirin held, HTN - PTA metoprolol, HLD - PTA statin  Pulm: +tobacco use, mild COPD - albuterol   GI: GERD - PTA nexium   : Sanderson in place, remove POD#1   Endo: NI  ID:  s/p Ancef  Psych/Neuro/MSK: Hx TIA; seasonal depression - no meds   Lines/drains: PIV, Sanderson   PPx: SCDs, plan Lovenox POD#1   Dispo: pending post-op goals  Code: full      Teresa Macias MD, MD  OBGYN PGY3  Gyn Onc Pager [phone]

## 2019-11-26 NOTE — ANESTHESIA CARE TRANSFER NOTE
Patient: Calista Atkinson    Procedure(s):  Exploratory laparotomy, removal of uterus, cervix, both ovaries and fallopian tubes    Diagnosis: Pelvic mass [R19.00]  Enlarged uterus [N85.2]  Diagnosis Additional Information: No value filed.    Anesthesia Type:   General     Note:  Airway :Face Mask  Patient transferred to:PACU  Comments: VSS. Breathing spontaneously at a regular rate with adequate tidal volumes and maintaining O2 sats on 6l. Denies nausea or pain. No apparent complications from anesthesia.     Daiana Huerta MD [facility] [place] pager [phone]  Anesthesia CA-1  Handoff Report: Identifed the Patient, Identified the Reponsible Provider, Reviewed the pertinent medical history, Discussed the surgical course, Reviewed Intra-OP anesthesia mangement and issues during anesthesia, Set expectations for post-procedure period and Allowed opportunity for questions and acknowledgement of understanding      Vitals: (Last set prior to Anesthesia Care Transfer)    CRNA VITALS  11/26/2019 1000 - 11/26/2019 1040      11/26/2019             Pulse:  72    SpO2:  100 %    Resp Rate (set):  10                Electronically Signed By: Daiana Huerta MD  November 26, 2019  10:40 AM

## 2019-11-26 NOTE — OR NURSING
MDA Desa at bedside, patient resting comfortably. Plan to transfer patient to , MDA will sign patient out at this time.

## 2019-11-26 NOTE — PROGRESS NOTES
Gynecology Oncology Daily Progress Note    Ms. Calista Atkinson is a 69 year old POD#1 s/p BYRON, BSO for pelvic mass.     Dz: Pelvic mass, benign on frozen     24 hr events:   - requiring O2 to maintain sat>92%     Subjective: Patient is feeling well.  Pain is very well controlled with PO medications. Tolerating fluids, not feeling hungry. Ambulated yesterday. Sanderson in place, being removed now. No f/c/cp/sob/n/v.     Objective:   Vitals:    11/26/19 1815 11/26/19 1851 11/26/19 1931 11/26/19 2200   BP:   136/50 134/55   BP Location:    Left arm   Pulse:   103 78   Resp:    16   Temp:    99.3  F (37.4  C)   TempSrc:    Oral   SpO2: 94% 95%  94%   Weight:       Height:           General: in NAD  CV: RRR, no m/r/g  Resp: CTAB  Abdomen: soft, nontender, tympanic on upper abdomen with mild distension  Incision: VML with dermabond - cd/i with bruising, no redness  Extremities: nontender, trace edema, SCDs in place    UOP:     Intake/Output Summary (Last 24 hours) at 11/27/2019 0624  Last data filed at 11/27/2019 0610  Gross per 24 hour   Intake 1431.25 ml   Output 1795 ml   Net -363.75 ml     700cc since midnight     Labs:   AM BMP/CBC pending     Assessment:   Ms. Calista Atkinson is a 69 year old POD#1 s/p BYRON, BSO for pelvic mass.     Active Problem list:  HTN  GERD  CAD  COPD  Obesity  Hyperlipidemia  Seasonal depression     Plan:    Disease: Pelvic mass - benign on frozen pathology, final pathology pending   FEN: Reg diet, LR 125cc/h. Discontinue fluids today.   Pain: Tylenol, oxycodone, dilaudid prn   Heme: Hgb 14.7>> AM labs pending. Asymptomatic, VSS.   CV: Hx MI, CAD s/p stent in 2012- aspirin held, HTN - PTA metoprolol, HLD - PTA statin  Pulm: +tobacco use, mild COPD - albuterol. Requiring 3L O2 overnight. Wean O2 as able today. Baseline in low to mid 90s per patient.   GI: GERD - PTA nexium   : Sanderson in place, remove today and f/u void   Endo: NI  ID:  s/p Ancef  Psych/Neuro/MSK: Hx TIA; seasonal depression -  no meds   Lines/drains: PIV  PPx: SCDs, plan Lovenox POD#1 pending stable Hgb   Dispo: pending post-op goals, likely POD#3. Plan discharge to daughter's house because she lives alone.   Code: full    Teresa Macias MD  OB/Gyn PGY-3    I, Manjit Meza MD personally examined and evaluated this patient on 11/27/19.  I discussed the patient with the resident and care team, and agree with the assessment and plan of care as documented in the residents note above.    I personally reviewed vital signs, laboratory values and imaging results.    Pt doing well post-op.  Frozen section benign.  Routine post-op cares.    Natalia Meza MD  Gynecologic Oncology  [facility]

## 2019-11-27 LAB
ANION GAP SERPL CALCULATED.3IONS-SCNC: 3 MMOL/L (ref 3–14)
BUN SERPL-MCNC: 7 MG/DL (ref 7–30)
CALCIUM SERPL-MCNC: 8.7 MG/DL (ref 8.5–10.1)
CHLORIDE SERPL-SCNC: 101 MMOL/L (ref 94–109)
CO2 SERPL-SCNC: 33 MMOL/L (ref 20–32)
CREAT SERPL-MCNC: 0.5 MG/DL (ref 0.52–1.04)
ERYTHROCYTE [DISTWIDTH] IN BLOOD BY AUTOMATED COUNT: 13.1 % (ref 10–15)
GFR SERPL CREATININE-BSD FRML MDRD: >90 ML/MIN/{1.73_M2}
GLUCOSE SERPL-MCNC: 103 MG/DL (ref 70–99)
HCT VFR BLD AUTO: 41.8 % (ref 35–47)
HGB BLD-MCNC: 13.3 G/DL (ref 11.7–15.7)
INTERPRETATION ECG - MUSE: NORMAL
MCH RBC QN AUTO: 30 PG (ref 26.5–33)
MCHC RBC AUTO-ENTMCNC: 31.8 G/DL (ref 31.5–36.5)
MCV RBC AUTO: 94 FL (ref 78–100)
PLATELET # BLD AUTO: 148 10E9/L (ref 150–450)
POTASSIUM SERPL-SCNC: 4.8 MMOL/L (ref 3.4–5.3)
RBC # BLD AUTO: 4.43 10E12/L (ref 3.8–5.2)
SODIUM SERPL-SCNC: 138 MMOL/L (ref 133–144)
WBC # BLD AUTO: 12.2 10E9/L (ref 4–11)

## 2019-11-27 PROCEDURE — 25000132 ZZH RX MED GY IP 250 OP 250 PS 637: Performed by: STUDENT IN AN ORGANIZED HEALTH CARE EDUCATION/TRAINING PROGRAM

## 2019-11-27 PROCEDURE — 25800030 ZZH RX IP 258 OP 636: Performed by: STUDENT IN AN ORGANIZED HEALTH CARE EDUCATION/TRAINING PROGRAM

## 2019-11-27 PROCEDURE — 99024 POSTOP FOLLOW-UP VISIT: CPT | Performed by: OBSTETRICS & GYNECOLOGY

## 2019-11-27 PROCEDURE — 85027 COMPLETE CBC AUTOMATED: CPT | Performed by: OBSTETRICS & GYNECOLOGY

## 2019-11-27 PROCEDURE — 40000275 ZZH STATISTIC RCP TIME EA 10 MIN

## 2019-11-27 PROCEDURE — 94640 AIRWAY INHALATION TREATMENT: CPT

## 2019-11-27 PROCEDURE — 25000125 ZZHC RX 250

## 2019-11-27 PROCEDURE — 12000001 ZZH R&B MED SURG/OB UMMC

## 2019-11-27 PROCEDURE — 80048 BASIC METABOLIC PNL TOTAL CA: CPT | Performed by: OBSTETRICS & GYNECOLOGY

## 2019-11-27 PROCEDURE — 36415 COLL VENOUS BLD VENIPUNCTURE: CPT | Performed by: OBSTETRICS & GYNECOLOGY

## 2019-11-27 PROCEDURE — 25000128 H RX IP 250 OP 636: Performed by: STUDENT IN AN ORGANIZED HEALTH CARE EDUCATION/TRAINING PROGRAM

## 2019-11-27 RX ADMIN — OXYCODONE HYDROCHLORIDE 10 MG: 5 TABLET ORAL at 03:37

## 2019-11-27 RX ADMIN — ACETAMINOPHEN 650 MG: 325 TABLET, FILM COATED ORAL at 19:17

## 2019-11-27 RX ADMIN — METOPROLOL TARTRATE 25 MG: 25 TABLET ORAL at 08:02

## 2019-11-27 RX ADMIN — ENOXAPARIN SODIUM 40 MG: 40 INJECTION SUBCUTANEOUS at 12:16

## 2019-11-27 RX ADMIN — ACETAMINOPHEN 650 MG: 325 TABLET, FILM COATED ORAL at 06:34

## 2019-11-27 RX ADMIN — BISACODYL 10 MG: 10 SUPPOSITORY RECTAL at 09:33

## 2019-11-27 RX ADMIN — ESOMEPRAZOLE MAGNESIUM 40 MG: 40 CAPSULE, DELAYED RELEASE PELLETS ORAL at 08:02

## 2019-11-27 RX ADMIN — METOPROLOL TARTRATE 25 MG: 25 TABLET ORAL at 19:17

## 2019-11-27 RX ADMIN — OXYCODONE HYDROCHLORIDE 10 MG: 5 TABLET ORAL at 21:09

## 2019-11-27 RX ADMIN — ACETAMINOPHEN 650 MG: 325 TABLET, FILM COATED ORAL at 12:16

## 2019-11-27 RX ADMIN — OXYCODONE HYDROCHLORIDE 10 MG: 5 TABLET ORAL at 16:24

## 2019-11-27 RX ADMIN — ALBUTEROL SULFATE 2.5 MG: 2.5 SOLUTION RESPIRATORY (INHALATION) at 19:19

## 2019-11-27 RX ADMIN — MAGNESIUM HYDROXIDE 15 ML: 400 SUSPENSION ORAL at 08:03

## 2019-11-27 RX ADMIN — ROSUVASTATIN CALCIUM 40 MG: 40 TABLET, FILM COATED ORAL at 21:09

## 2019-11-27 RX ADMIN — ACETAMINOPHEN 650 MG: 325 TABLET, FILM COATED ORAL at 00:29

## 2019-11-27 RX ADMIN — SODIUM CHLORIDE, POTASSIUM CHLORIDE, SODIUM LACTATE AND CALCIUM CHLORIDE: 600; 310; 30; 20 INJECTION, SOLUTION INTRAVENOUS at 03:41

## 2019-11-27 RX ADMIN — OXYCODONE HYDROCHLORIDE 10 MG: 5 TABLET ORAL at 12:16

## 2019-11-27 ASSESSMENT — MIFFLIN-ST. JEOR: SCORE: 1722.58

## 2019-11-27 ASSESSMENT — ACTIVITIES OF DAILY LIVING (ADL)
ADLS_ACUITY_SCORE: 15
ADLS_ACUITY_SCORE: 15
ADLS_ACUITY_SCORE: 13
ADLS_ACUITY_SCORE: 15

## 2019-11-27 ASSESSMENT — PAIN DESCRIPTION - DESCRIPTORS
DESCRIPTORS: ACHING
DESCRIPTORS: DULL;ACHING

## 2019-11-27 NOTE — PLAN OF CARE
VSS. Requiring 3-4L oxygen to maintain oxygen sats greater than 90%. IS, deep breathing encouraged. Gyn Onc notified of O2 requirements. PRN NEB ordered. Incisional pain managed with prn Oxycodone and scheduled Tylenol. Tolerated ambulation in hallway and sitting up in chair well. Taking sips of clear liquid without nausea. Denies passing gas. Midline with area of redness on lower portion.   Continue with POC. Monitor VS, pain and incision.

## 2019-11-27 NOTE — PLAN OF CARE
"/55 (BP Location: Left arm)   Pulse 78   Temp 99.3  F (37.4  C) (Oral)   Resp 16   Ht 1.702 m (5' 7.01\")   Wt 117.2 kg (258 lb 6.1 oz)   SpO2 94%   BMI 40.46 kg/m      Assumed care of pt from VSS ex requiring O2. Was able to titrate down from 4L to 3L. Pain controlled with PRN oxycodone. Up with SBA. Kin in place. Pt has only tried clears this luis eduardo. No N/V. PIV infusing MIVF. Second PIV SL. Midline with dermabond c/d/intact. Pt resting comfortably between cares. Continue POC.   "

## 2019-11-27 NOTE — PLAN OF CARE
"/55 (BP Location: Left arm)   Pulse 78   Temp 99.3  F (37.4  C) (Oral)   Resp 16   Ht 1.702 m (5' 7.01\")   Wt 117.2 kg (258 lb 6.1 oz)   SpO2 94%   BMI 40.46 kg/m      Pt A&Ox4. VSS ex requiring oxygen on 2.5L. Pain controlled with PRN oxycodone. Pt tolerating clears water and coffee. PIV ZULEMA Sanderson removed. Up assist of 1. Able to make needs known, call light in reach. Nurse will continue with POC.  "

## 2019-11-27 NOTE — PROGRESS NOTES
Interim Note    S: Notified by RN of patient requiring 3-4 L O2 to maintain oxygen saturation > 90%. Went to evaluate. Patient denies shortness of breath or chest pain. States she has history of COPD and has two inhalers and a nebulizer prescribed to her. She had a recent episode of bronchitis and states she was using the nebulizer preoperatively. Patient is also a smoker.     O:  Patient Vitals for the past 6 hrs:   BP Heart Rate Resp SpO2   11/26/19 1815 -- -- -- 94 %   11/26/19 1800 -- -- -- (!) 88 %   11/26/19 1619 (!) 141/56 77 -- 93 %   11/26/19 1430 -- 77 18 92 %   11/26/19 1331 (!) 155/66 76 18 92 %   11/26/19 1300 (!) 150/65 75 18 92 %     Gen: Sitting up in chair, no acute distress  CV: Regular rate and rhythm  Lungs: Diminished air movement at lung bases, no wheezes or crackles    A/P:  Calista Atkinson is a 69 year old POD#0 s/p BYRON, BSO for pelvic mass, now with increasing oxygen demands.  - Ordered home nebulizer treatment  - Encourage incentive spirometer use  - Continue to monitor oxygen requirements    Sri Moe MD  OB/GYN PGY-2  11/26/19 6:55 PM

## 2019-11-28 VITALS
OXYGEN SATURATION: 95 % | DIASTOLIC BLOOD PRESSURE: 60 MMHG | TEMPERATURE: 97.4 F | WEIGHT: 257.9 LBS | BODY MASS INDEX: 40.48 KG/M2 | HEIGHT: 67 IN | HEART RATE: 72 BPM | RESPIRATION RATE: 16 BRPM | SYSTOLIC BLOOD PRESSURE: 137 MMHG

## 2019-11-28 PROCEDURE — 99024 POSTOP FOLLOW-UP VISIT: CPT | Performed by: OBSTETRICS & GYNECOLOGY

## 2019-11-28 PROCEDURE — 25000132 ZZH RX MED GY IP 250 OP 250 PS 637: Performed by: STUDENT IN AN ORGANIZED HEALTH CARE EDUCATION/TRAINING PROGRAM

## 2019-11-28 RX ORDER — ACETAMINOPHEN 325 MG/1
650 TABLET ORAL EVERY 6 HOURS PRN
Qty: 24 TABLET | Refills: 0 | Status: SHIPPED | OUTPATIENT
Start: 2019-11-28

## 2019-11-28 RX ORDER — OXYCODONE HYDROCHLORIDE 5 MG/1
5 TABLET ORAL EVERY 6 HOURS PRN
Qty: 10 TABLET | Refills: 0 | Status: SHIPPED | OUTPATIENT
Start: 2019-11-28 | End: 2019-12-10

## 2019-11-28 RX ORDER — IBUPROFEN 600 MG/1
600 TABLET, FILM COATED ORAL EVERY 6 HOURS PRN
Qty: 12 TABLET | Refills: 0 | Status: SHIPPED | OUTPATIENT
Start: 2019-11-28

## 2019-11-28 RX ADMIN — OXYCODONE HYDROCHLORIDE 10 MG: 5 TABLET ORAL at 10:16

## 2019-11-28 RX ADMIN — OXYCODONE HYDROCHLORIDE 10 MG: 5 TABLET ORAL at 01:07

## 2019-11-28 RX ADMIN — ESOMEPRAZOLE MAGNESIUM 40 MG: 40 CAPSULE, DELAYED RELEASE PELLETS ORAL at 08:21

## 2019-11-28 RX ADMIN — ACETAMINOPHEN 650 MG: 325 TABLET, FILM COATED ORAL at 06:50

## 2019-11-28 RX ADMIN — METOPROLOL TARTRATE 25 MG: 25 TABLET ORAL at 08:21

## 2019-11-28 RX ADMIN — ACETAMINOPHEN 650 MG: 325 TABLET, FILM COATED ORAL at 01:07

## 2019-11-28 ASSESSMENT — ACTIVITIES OF DAILY LIVING (ADL)
ADLS_ACUITY_SCORE: 13

## 2019-11-28 ASSESSMENT — PAIN DESCRIPTION - DESCRIPTORS: DESCRIPTORS: ACHING

## 2019-11-28 NOTE — PROGRESS NOTES
Gynecology Oncology Daily Progress Note    Ms. Calista Atkinson is a 69 year old POD#2 s/p BYRON, BSO for pelvic mass.     Dz: Pelvic mass, benign on frozen     24 hr events   - continues to require O2, 2L    Subjective: Patient is feeling well.  Pain is very well controlled with PO medications. Tolerating PO, no nausea or vomiting. Has had bowel movement. Ambulating without dizziness. Voiding spontaneously.     Objective:   Vitals:    11/27/19 1442 11/27/19 1917 11/27/19 1919 11/27/19 2224   BP: (!) 152/67 (!) 142/59  113/57   BP Location: Right arm   Right arm   Pulse:  91     Resp: 18   15   Temp: 96.8  F (36  C)   97.1  F (36.2  C)   TempSrc: Oral   Oral   SpO2: 93%  95% 98%   Weight:       Height:         General: in NAD  CV: Regular rate, well perfused  Resp: CTAB  Abdomen: soft, nontender, tympanic on upper abdomen with mild distension  Incision: VML with dermabond - cd/i with bruising, no redness  Extremities: nontender, trace edema, SCDs in place    UOP: 80 ml/hr over past 24 hours, 100 ml since midnight    Assessment:   Ms. Calista Atkinosn is a 69 year old POD#2 s/p BYRON, BSO for pelvic mass.     Active Problem list:  HTN  GERD  CAD  COPD  Obesity  Hyperlipidemia  Seasonal depression     Plan:    Disease: Pelvic mass - benign on frozen pathology, final pathology pending   FEN: Reg diet, s/p IVF  Pain: Tylenol, oxycodone, dilaudid prn   Heme: Hgb 14.7>> 13.3 . Asymptomatic, VSS.   CV: Hx MI, CAD s/p stent in 2012- aspirin held, HTN - PTA metoprolol, HLD - PTA statin  Pulm: +tobacco use, mild COPD - albuterol. Requiring 3L O2 overnight. Wean O2 as able today. Baseline in low to mid 90s per patient.   GI: GERD - PTA nexium   : S/p sanchez, voiding spontaneously  Endo: NI  ID:  s/p Ancef  Psych/Neuro/MSK: Hx TIA; seasonal depression - no meds   Lines/drains: PIV  PPx: SCDs, Lovenox  Dispo: Discharge today if improvement in O2. Plan discharge to daughter's house because she lives alone.   Code: delma Fierro  MD Payal  OB/GYN PGY-2  11/28/19 6:57 AM     I, Manjit Meza MD personally examined and evaluated this patient on 11/28/19.  I discussed the patient with the resident and care team, and agree with the assessment and plan of care as documented in the residents note above.    I personally reviewed vital signs, laboratory values and imaging results.    Pt doing well, home today.    Natalia Meza MD  Gynecologic Oncology  [facility]

## 2019-11-28 NOTE — PLAN OF CARE
On 1L per NC. AOVSS. Pain managed with oxycodone and tylenol. PIV SL. Abdominal binder in place for comfort. Midline incision CDI. Ecchymotic. Tolerating diet. Denies n/v. Up ad beckie. Slept comfortably for duration of shift. AROBF.     Pt requesting shower this AM.

## 2019-11-28 NOTE — PLAN OF CARE
"BP (!) 142/59   Pulse 91   Temp 96.8  F (36  C) (Oral)   Resp 18   Ht 1.702 m (5' 7.01\")   Wt 117 kg (257 lb 14.4 oz)   SpO2 95%   BMI 40.38 kg/m      VSS on RA. Pain comfortably managed with scheduled Tylenol and PRN oxycodone. UAL.   Voids spont with adequate UOP. Tolerating reg diet with no N/V. Awaiting post-op ROBF. Midline incision with dermabond c/d/intact. 2 PIV in L hand SL. Pt resting comfortably between cares. Continue POC.   " Yes

## 2019-11-28 NOTE — PLAN OF CARE
Nursing Discharge Summary:    AVSS, 95% on RA. A&O x4, up ad beckie. Tolerating regular diet, denies N/V. Passing flatus and last BM 11/28/19. Voiding adequately. Abd midline incision ASHISH/WNL. Abd pain adequately controlled w/ scheduled Tylenol and PRN PO Oxycodone.     Patient deemed adequate for discharge by primary team. All discharge education completed, patient and daughter demonstrate understanding. PIVs x2 removed.     Pt left Unit [address]C @ 1105 to be discharged to home with daughter.     Bushra Aly RN on 11/28/2019 at 11:08 AM

## 2019-11-29 ENCOUNTER — TELEPHONE (OUTPATIENT)
Dept: FAMILY MEDICINE | Facility: CLINIC | Age: 69
End: 2019-11-29

## 2019-11-29 ENCOUNTER — PATIENT OUTREACH (OUTPATIENT)
Dept: ONCOLOGY | Facility: CLINIC | Age: 69
End: 2019-11-29

## 2019-11-29 NOTE — PROGRESS NOTES
Follow up call to pt, s/p Carson THOMPSON, BSO on 11/26/19 with Dr Meza and pt discharged 11/28/19.  Pt states overall she is doing pretty well.  Abdominal discomfort controlled with oxycodone (pt took one dose this morning) and alternating as well with tylenol and ibuprofen as needed.  Incision without s/s infecton, denies fevers.  Drinking well although appetite fair.  No BM yet but passing gas.  Pt was not sent home with Brigid CARRILLO so instructed pt on use, take 1-2 tabs po daily, may increase to bid.  Pt may also take miralax one capful daily if needed.  Also provided these instructions to daughter who states she can  for pt.  Pt instructed if she has questions or new concerns over the weekend, she may call on call number and pt verbalized understanding of plan.    Suyapa Barron RN, BSN, OCN

## 2019-11-29 NOTE — TELEPHONE ENCOUNTER
[facility] ER for Pelvic Mass, Enlarged Uterus, S/P Hysterectomy    No Er follow up scheduled     Tracy Quincy/TC

## 2019-12-02 NOTE — TELEPHONE ENCOUNTER
"Hospital/TCU/ED for chronic condition Discharge Protocol    \"Hi, my name is Aide Fair RN, a registered nurse, and I am calling from [facility].  I am calling to follow up and see how things are going for you after your recent emergency visit/hospital/TCU stay.\"    Tell me how you are doing now that you are home?\" Doing well. Pain level is under control.  Taking (1) oxycodone per day.  Has only 4 left.  Will contact OBGYN/Oncology.  Did have a bowel movement.      Discharge Instructions    \"Let's review your discharge instructions.  What is/are the follow-up recommendations?  Pt. Response:  f/u with PCP if no improvement or worsening.      \"Has an appointment with your primary care provider been scheduled?\"   Patient had appt today 12/2/2019, but had to cancel because Anny Peterson went on maternity leave early.  Has f/u with Oncology 12/10/2019.    \"When you see the provider, I would recommend that you bring your medications with you.\"    Medications    \"Tell me what changed about your medicines when you discharged?\"    Changes to chronic meds?    0-1    \"What questions do you have about your medications?\"    None     New diagnoses of heart failure, COPD, diabetes, or MI?    No              Post Discharge Medication Reconciliation Status: discharge medications reconciled, continue medications without change.    Was MTM referral placed (*Make sure to put transitions as reason for referral)?   No    Call Summary    \"What questions or concerns do you have about your recent visit and your follow-up care?\"     none    \"If you have questions or things don't continue to improve, we encourage you contact us through the main clinic number (give number).  Even if the clinic is not open, triage nurses are available 24/7 to help you.     We would like you to know that our clinic has extended hours (provide information).  We also have urgent care (provide details on closest location and hours/contact " "info)\"      \"Thank you for your time and take care!\"    Aide Fair RN         "

## 2019-12-04 ENCOUNTER — DOCUMENTATION ONLY (OUTPATIENT)
Dept: OTHER | Facility: CLINIC | Age: 69
End: 2019-12-04

## 2019-12-04 LAB — COPATH REPORT: NORMAL

## 2019-12-09 LAB — COPATH REPORT: NORMAL

## 2019-12-09 NOTE — PROGRESS NOTES
Consult Notes on Referred Patient        Dr. Dorita Younger MD  [facility]  [address]  [place] [address]       RE: Calista Atkinson  : 1950  NATHAN: Dec 10, 2019    HPI:  Calista Atkinson is 69 year old with benign findings.  She is accompanied today by her two daugthers.  She is doing well post-operatively.  Eating and drinking normally.  Normal bowel and bladder function.  Pain minimal on tylenol.  No vaginal bleeding.  Her only complaint is a decreased appetite.    Clinical Course:    Patient has been having abdominal bloating, early satiety, constipation and general abdominal discomfort for the past 3-4 weeks.  She was eventually admitted for dehydration as she wasn't eating or drinking well and was found to have RENETTA, hypokalemia and the imaging findings below.  At that time she wasn't having any PMB but since her attempted EMB she has been having PMB.  Mostly light spotting.  Since her hospital discharge her symptoms have improved slightly, however she still does not have a normal appetite and feels bloating/early satiety.    19:  CT Chest lung cancer screen:  1. ACR Assessment Category:  Lung-RADS Category 2. Benign appearance or behavior. Recommendation: Continue annual screening, if clinically relevant (please order exam code IMG 2290). 2. Significant Incidental Finding(s):  Category S: No.      10/17/19:  CT A/P:  1. The uterus is markedly enlarged, possibly related to the presence of fibroids. Pelvic ultrasound and/or MRI may be helpful for further characterization. 2. A left ovarian cystic lesion is indeterminate, and measures 6.6 cm. Ovarian malignancy is not excluded. 3. Colonic diverticulosis, without convincing evidence for diverticulitis.    10/23/19:  EMB:  Acellular sample    10/23/19:   = 56    19:  Exploratory laparotomy, total abdominal hysterectomy, bilateral salpingo-oophorectomy   Pathology:  Benign leiomyoma and serous cyst    Review of  Systems:  Systemic           no weight changes; no fever; no chills; no night sweats; + appetite changes  Skin           no rashes, or lesions  Eye           no irritation; no changes in vision  Evelio-Laryngeal           no dysphagia; no hoarseness   Pulmonary    + cough; no wheezing; + shortness of breath  Cardiovascular    no chest pain; no palpitations  Gastrointestinal    no diarrhea; no constipation; no abdominal pain; no changes in bowel  habits; no blood in stool  Genitourinary   no urinary frequency; no urinary urgency; + incontinence; no dysuria; no pain; no abnormal vaginal discharge; no abnormal vaginal bleeding  Breast    no breast discharge; no breast changes; no breast pain  Musculoskeletal    no myalgias; no arthralgias; no back pain  Psychiatric           no depressed mood; no anxiety    Hematologic            no tender lymph nodes; no noticeable swellings or lumps   Endocrine    no hot flashes; no heat/cold intolerance         Neurological   no tremor; no numbness and tingling; no headaches; + difficulty sleeping    Obstetrics and Gynecology History:  ,  x 3, c/s x 1   no PMB or HRT use      Past Medical History:  Past Medical History:   Diagnosis Date     CAD (coronary artery disease) 2012    STEMI and stent      Carotid artery plaque 2015     COPD (chronic obstructive pulmonary disease) (H)      CVA (cerebral infarction)      Gastro-oesophageal reflux disease      Hyperlipidemia      Hypertension      Myocardial infarction (H)      S/P carotid endarterectomy 2015       Past Surgical History:  Past Surgical History:   Procedure Laterality Date     ANGIOGRAM  12    thrombectomy and stent RCA     CARDIAC SURGERY        SECTION       ENDARTERECTOMY CAROTID Left 3/27/12     HEART CATH LEFT HEART CATH  03/02/15    Evidence of old MI, Continued good results of the stent in mid RCA with mild disease elsewhere in the RCA, Disease in very small branch of the distal  CFX.      HYSTERECTOMY TOTAL ABDOMINAL, BILATERAL SALPINGO-OOPHORECTOMY, NODE DISSECTION, COMBINED Bilateral 11/26/2019    Procedure: Exploratory laparotomy, removal of uterus, cervix, both ovaries and fallopian tubes;  Surgeon: Natalia Us MD;  Location: [facility] OR     TONSILLECTOMY      age 9       Health Maintenance:  Last Pap Smear: No need for further pap smear exams  She has not had a history of abnormal Pap smears.    Last Mammogram: 7/13/18              Result: normal      She has not had a history of abnormal mammograms.    Last Colonoscopy: Never              Result: FOBT yearly all negative      Current Medications:   has a current medication list which includes the following prescription(s): acetaminophen, acetaminophen, albuterol, albuterol, aspirin, esomeprazole, ibuprofen, ipratropium, metoprolol tartrate, and rosuvastatin.     Allergies:   Patient has no known allergies.      Social History:  Social History     Socioeconomic History     Marital status:      Spouse name: Not on file     Number of children: Not on file     Years of education: Not on file     Highest education level: Not on file   Occupational History     Not on file   Social Needs     Financial resource strain: Not on file     Food insecurity:     Worry: Not on file     Inability: Not on file     Transportation needs:     Medical: Not on file     Non-medical: Not on file   Tobacco Use     Smoking status: Current Every Day Smoker     Packs/day: 0.25     Types: Cigarettes     Smokeless tobacco: Never Used     Tobacco comment: 4-5 cig daily.    Substance and Sexual Activity     Alcohol use: No     Alcohol/week: 0.0 standard drinks     Drug use: No     Sexual activity: Not Currently   Lifestyle     Physical activity:     Days per week: Not on file     Minutes per session: Not on file     Stress: Not on file   Relationships     Social connections:     Talks on phone: Not on file     Gets together: Not on file     Attends  Orthodoxy service: Not on file     Active member of club or organization: Not on file     Attends meetings of clubs or organizations: Not on file     Relationship status: Not on file     Intimate partner violence:     Fear of current or ex partner: Not on file     Emotionally abused: Not on file     Physically abused: Not on file     Forced sexual activity: Not on file   Other Topics Concern     Parent/sibling w/ CABG, MI or angioplasty before 65F 55M? Not Asked      Service Not Asked     Blood Transfusions Not Asked     Caffeine Concern Yes     Comment: 2-3 cups daily     Occupational Exposure Not Asked     Hobby Hazards Not Asked     Sleep Concern Yes     Comment: off and on sleeping     Stress Concern Not Asked     Weight Concern Not Asked     Special Diet No     Back Care Not Asked     Exercise No     Bike Helmet Not Asked     Seat Belt Not Asked     Self-Exams Not Asked   Social History Narrative     Not on file       Lives alone, feels safe at home.  Retired cook.  Enjoys puzzles, playing cards, going to the [employer].  Gets SOB walking a couple blocks or up a flight of stairs.  Does not have an advanced directive on file and would like her children to be her POA.  Would like full resuscitation if reversible cause is identified, however would not like to be kept on life sustaining measures long-term.     Family History:   The patient's family history is significant for.  Family History   Problem Relation Age of Onset     Myocardial Infarction Mother      Hypertension Mother      Prostate Cancer Father      Cancer Father         Lymphnode     Cancer - colorectal Paternal Grandfather      Colon Cancer Paternal Grandfather      Alcohol/Drug Son         Alcoholic     Hypertension Daughter      Hyperlipidemia Daughter      Myocardial Infarction Brother      Respiratory Brother         Pneumonia      Other - See Comments Brother         work accident          Physical Exam:   /77   Pulse 71   Temp 97  F  (36.1  C) (Tympanic)   Resp 16   Wt 108 kg (238 lb)   SpO2 94%   BMI 37.27 kg/m    Body mass index is 37.27 kg/m .    General Appearance: healthy and alert, no distress     Gastrointestinal:       abdomen soft, non-distended, non-tender, incision without erythema/induration or drainage    Genitourinary: External genitalia and urethral meatus appears normal.  Vagina is smooth with a well healing vaginal cuff      Assessment:    Calista Atkinson is a 69 year old woman with leiomyoma and serous cyst.     A total of 15 minutes was spent with the patient, >50% of which were spent in counseling the patient and/or treatment planning, this was separate from the 5 minutes spent on post-operative cares.      Plan:     1.)    Benign leiomyoma and serous cyst-pathology was reviewed and she was provided with a copy of her results.  Given the benign pathology she no longer needs follow up with the gynecologic oncology clinic unless problems arise.  She no longer needs screening pap smears.  She should continue routine care with her PCP     2.) Genetic risk factors were assessed and the patient does not meet the qualifications for a referral.      3.) Labs and/or tests ordered include:  Colonoscopy, mammogram     4.) Health maintenance issues addressed today include pt is due for a colonoscopy and mammogram which she will schedule.          Thank you for allowing us to participate in the care of your patient.         Sincerely,    Natalia Meza MD  Gynecologic Oncology  [facility]       [facility]ARKINS

## 2019-12-10 ENCOUNTER — ONCOLOGY VISIT (OUTPATIENT)
Dept: ONCOLOGY | Facility: CLINIC | Age: 69
End: 2019-12-10
Attending: OBSTETRICS & GYNECOLOGY
Payer: COMMERCIAL

## 2019-12-10 VITALS
TEMPERATURE: 97 F | WEIGHT: 238 LBS | SYSTOLIC BLOOD PRESSURE: 136 MMHG | DIASTOLIC BLOOD PRESSURE: 77 MMHG | OXYGEN SATURATION: 94 % | RESPIRATION RATE: 16 BRPM | BODY MASS INDEX: 37.27 KG/M2 | HEART RATE: 71 BPM

## 2019-12-10 DIAGNOSIS — Z12.11 SPECIAL SCREENING FOR MALIGNANT NEOPLASMS, COLON: ICD-10-CM

## 2019-12-10 DIAGNOSIS — D25.1 INTRAMURAL LEIOMYOMA OF UTERUS: Primary | ICD-10-CM

## 2019-12-10 DIAGNOSIS — Z12.31 VISIT FOR SCREENING MAMMOGRAM: ICD-10-CM

## 2019-12-10 PROCEDURE — G0463 HOSPITAL OUTPT CLINIC VISIT: HCPCS

## 2019-12-10 PROCEDURE — 99024 POSTOP FOLLOW-UP VISIT: CPT | Performed by: OBSTETRICS & GYNECOLOGY

## 2019-12-10 ASSESSMENT — PAIN SCALES - GENERAL: PAINLEVEL: NO PAIN (0)

## 2019-12-10 NOTE — LETTER
12/10/2019         RE: Calista Atkinson  [address]  [place] [address]        Dear Colleague,    Thank you for referring your patient, Calista Atkinson, to the [facility]. Please see a copy of my visit note below.    Consult Notes on Referred Patient        Dr. Dorita Younger MD  [facility]  [address]  [place] [address]       RE: Calista Atkinson  : 1950  NATHAN: Dec 10, 2019    HPI:  Calista Atkinson is 69 year old with benign findings.  She is accompanied today by her two daugthers.  She is doing well post-operatively.  Eating and drinking normally.  Normal bowel and bladder function.  Pain minimal on tylenol.  No vaginal bleeding.  Her only complaint is a decreased appetite.    Clinical Course:    Patient has been having abdominal bloating, early satiety, constipation and general abdominal discomfort for the past 3-4 weeks.  She was eventually admitted for dehydration as she wasn't eating or drinking well and was found to have RENETTA, hypokalemia and the imaging findings below.  At that time she wasn't having any PMB but since her attempted EMB she has been having PMB.  Mostly light spotting.  Since her hospital discharge her symptoms have improved slightly, however she still does not have a normal appetite and feels bloating/early satiety.    19:  CT Chest lung cancer screen:  1. ACR Assessment Category:  Lung-RADS Category 2. Benign appearance or behavior. Recommendation: Continue annual screening, if clinically relevant (please order exam code IMG 2290). 2. Significant Incidental Finding(s):  Category S: No.      10/17/19:  CT A/P:  1. The uterus is markedly enlarged, possibly related to the presence of fibroids. Pelvic ultrasound and/or MRI may be helpful for further characterization. 2. A left ovarian cystic lesion is indeterminate, and measures 6.6 cm. Ovarian malignancy is not excluded. 3. Colonic diverticulosis, without convincing evidence for  diverticulitis.    10/23/19:  EMB:  Acellular sample    10/23/19:   = 56    19:  Exploratory laparotomy, total abdominal hysterectomy, bilateral salpingo-oophorectomy   Pathology:  Benign leiomyoma and serous cyst    Review of Systems:  Systemic           no weight changes; no fever; no chills; no night sweats; + appetite changes  Skin           no rashes, or lesions  Eye           no irritation; no changes in vision  Evelio-Laryngeal           no dysphagia; no hoarseness   Pulmonary    + cough; no wheezing; + shortness of breath  Cardiovascular    no chest pain; no palpitations  Gastrointestinal    no diarrhea; no constipation; no abdominal pain; no changes in bowel  habits; no blood in stool  Genitourinary   no urinary frequency; no urinary urgency; + incontinence; no dysuria; no pain; no abnormal vaginal discharge; no abnormal vaginal bleeding  Breast    no breast discharge; no breast changes; no breast pain  Musculoskeletal    no myalgias; no arthralgias; no back pain  Psychiatric           no depressed mood; no anxiety    Hematologic            no tender lymph nodes; no noticeable swellings or lumps   Endocrine    no hot flashes; no heat/cold intolerance         Neurological   no tremor; no numbness and tingling; no headaches; + difficulty sleeping    Obstetrics and Gynecology History:  ,  x 3, c/s x 1   no PMB or HRT use      Past Medical History:  Past Medical History:   Diagnosis Date     CAD (coronary artery disease) 2012    STEMI and stent      Carotid artery plaque 2015     COPD (chronic obstructive pulmonary disease) (H)      CVA (cerebral infarction)      Gastro-oesophageal reflux disease      Hyperlipidemia      Hypertension      Myocardial infarction (H)      S/P carotid endarterectomy 2015       Past Surgical History:  Past Surgical History:   Procedure Laterality Date     ANGIOGRAM  12    thrombectomy and stent RCA     CARDIAC SURGERY        SECTION        ENDARTERECTOMY CAROTID Left 3/27/12     HEART CATH LEFT HEART CATH  03/02/15    Evidence of old MI, Continued good results of the stent in mid RCA with mild disease elsewhere in the RCA, Disease in very small branch of the distal CFX.      HYSTERECTOMY TOTAL ABDOMINAL, BILATERAL SALPINGO-OOPHORECTOMY, NODE DISSECTION, COMBINED Bilateral 11/26/2019    Procedure: Exploratory laparotomy, removal of uterus, cervix, both ovaries and fallopian tubes;  Surgeon: Natalia Us MD;  Location: [facility] OR     TONSILLECTOMY      age 9       Health Maintenance:  Last Pap Smear: No need for further pap smear exams  She has not had a history of abnormal Pap smears.    Last Mammogram: 7/13/18              Result: normal      She has not had a history of abnormal mammograms.    Last Colonoscopy: Never              Result: FOBT yearly all negative      Current Medications:   has a current medication list which includes the following prescription(s): acetaminophen, acetaminophen, albuterol, albuterol, aspirin, esomeprazole, ibuprofen, ipratropium, metoprolol tartrate, and rosuvastatin.     Allergies:   Patient has no known allergies.      Social History:  Social History     Socioeconomic History     Marital status:      Spouse name: Not on file     Number of children: Not on file     Years of education: Not on file     Highest education level: Not on file   Occupational History     Not on file   Social Needs     Financial resource strain: Not on file     Food insecurity:     Worry: Not on file     Inability: Not on file     Transportation needs:     Medical: Not on file     Non-medical: Not on file   Tobacco Use     Smoking status: Current Every Day Smoker     Packs/day: 0.25     Types: Cigarettes     Smokeless tobacco: Never Used     Tobacco comment: 4-5 cig daily.    Substance and Sexual Activity     Alcohol use: No     Alcohol/week: 0.0 standard drinks     Drug use: No     Sexual activity: Not Currently    Lifestyle     Physical activity:     Days per week: Not on file     Minutes per session: Not on file     Stress: Not on file   Relationships     Social connections:     Talks on phone: Not on file     Gets together: Not on file     Attends Pentecostal service: Not on file     Active member of club or organization: Not on file     Attends meetings of clubs or organizations: Not on file     Relationship status: Not on file     Intimate partner violence:     Fear of current or ex partner: Not on file     Emotionally abused: Not on file     Physically abused: Not on file     Forced sexual activity: Not on file   Other Topics Concern     Parent/sibling w/ CABG, MI or angioplasty before 65F 55M? Not Asked      Service Not Asked     Blood Transfusions Not Asked     Caffeine Concern Yes     Comment: 2-3 cups daily     Occupational Exposure Not Asked     Hobby Hazards Not Asked     Sleep Concern Yes     Comment: off and on sleeping     Stress Concern Not Asked     Weight Concern Not Asked     Special Diet No     Back Care Not Asked     Exercise No     Bike Helmet Not Asked     Seat Belt Not Asked     Self-Exams Not Asked   Social History Narrative     Not on file       Lives alone, feels safe at home.  Retired cook.  Enjoys puzzles, playing cards, going to the [employer].  Gets SOB walking a couple blocks or up a flight of stairs.  Does not have an advanced directive on file and would like her children to be her POA.  Would like full resuscitation if reversible cause is identified, however would not like to be kept on life sustaining measures long-term.     Family History:   The patient's family history is significant for.  Family History   Problem Relation Age of Onset     Myocardial Infarction Mother      Hypertension Mother      Prostate Cancer Father      Cancer Father         Lymphnode     Cancer - colorectal Paternal Grandfather      Colon Cancer Paternal Grandfather      Alcohol/Drug Son         Alcoholic      Hypertension Daughter      Hyperlipidemia Daughter      Myocardial Infarction Brother      Respiratory Brother         Pneumonia      Other - See Comments Brother         work accident          Physical Exam:   /77   Pulse 71   Temp 97  F (36.1  C) (Tympanic)   Resp 16   Wt 108 kg (238 lb)   SpO2 94%   BMI 37.27 kg/m     Body mass index is 37.27 kg/m .    General Appearance: healthy and alert, no distress     Gastrointestinal:       abdomen soft, non-distended, non-tender, incision without erythema/induration or drainage    Genitourinary: External genitalia and urethral meatus appears normal.  Vagina is smooth with a well healing vaginal cuff      Assessment:    Calista Atkinson is a 69 year old woman with leiomyoma and serous cyst.     A total of 15 minutes was spent with the patient, >50% of which were spent in counseling the patient and/or treatment planning, this was separate from the 5 minutes spent on post-operative cares.      Plan:     1.)    Benign leiomyoma and serous cyst-pathology was reviewed and she was provided with a copy of her results.  Given the benign pathology she no longer needs follow up with the gynecologic oncology clinic unless problems arise.  She no longer needs screening pap smears.  She should continue routine care with her PCP     2.) Genetic risk factors were assessed and the patient does not meet the qualifications for a referral.      3.) Labs and/or tests ordered include:  Colonoscopy, mammogram     4.) Health maintenance issues addressed today include pt is due for a colonoscopy and mammogram which she will schedule.          Thank you for allowing us to participate in the care of your patient.         Sincerely,    Natalia Meza MD  Gynecologic Oncology  [facility]       [facility]ARK[facility]         Again, thank you for allowing me to participate in the care of your patient.        Sincerely,        Manjit Meza MD

## 2019-12-10 NOTE — PATIENT INSTRUCTIONS
Colonoscopy    Mammogram    Patient left before scheduling.  She is scheduled for f/u in 1 yr with Dr Meza.  I called and left patient a message with this date.  I also called [facility] and they will be calling the patient to help schedule her colonoscopy and asked patient to return call to us to help her schedule her mammogram. jerson

## 2019-12-10 NOTE — NURSING NOTE
"Oncology Rooming Note    December 10, 2019 7:54 AM   Calista Atkinson is a 69 year old female who presents for:    Chief Complaint   Patient presents with     Oncology Clinic Visit     Post op Follow up     Initial Vitals: /77   Pulse 71   Temp 97  F (36.1  C) (Tympanic)   Resp 16   Wt 108 kg (238 lb)   SpO2 94%   BMI 37.27 kg/m   Estimated body mass index is 37.27 kg/m  as calculated from the following:    Height as of 11/26/19: 1.702 m (5' 7.01\").    Weight as of this encounter: 108 kg (238 lb). Body surface area is 2.26 meters squared.  No Pain (0) Comment: Data Unavailable   No LMP recorded. Patient is postmenopausal.  Allergies reviewed: Yes  Medications reviewed: Yes    Medications: Medication refills not needed today.  Pharmacy name entered into [facility]: [id number] - [place], [place] - [address]    Clinical concerns: follow up       Ness Elliott CMA              "

## 2020-01-03 ENCOUNTER — OFFICE VISIT (OUTPATIENT)
Dept: FAMILY MEDICINE | Facility: CLINIC | Age: 70
End: 2020-01-03
Payer: COMMERCIAL

## 2020-01-03 VITALS
HEART RATE: 78 BPM | TEMPERATURE: 98 F | BODY MASS INDEX: 37.04 KG/M2 | SYSTOLIC BLOOD PRESSURE: 124 MMHG | OXYGEN SATURATION: 98 % | HEIGHT: 67 IN | DIASTOLIC BLOOD PRESSURE: 78 MMHG | RESPIRATION RATE: 16 BRPM | WEIGHT: 236 LBS

## 2020-01-03 DIAGNOSIS — J44.9 CHRONIC OBSTRUCTIVE PULMONARY DISEASE, UNSPECIFIED COPD TYPE (H): Primary | ICD-10-CM

## 2020-01-03 DIAGNOSIS — K21.9 GASTROESOPHAGEAL REFLUX DISEASE, ESOPHAGITIS PRESENCE NOT SPECIFIED: ICD-10-CM

## 2020-01-03 DIAGNOSIS — Z23 NEED FOR PROPHYLACTIC VACCINATION AND INOCULATION AGAINST INFLUENZA: ICD-10-CM

## 2020-01-03 DIAGNOSIS — E78.2 MIXED HYPERLIPIDEMIA: ICD-10-CM

## 2020-01-03 DIAGNOSIS — Z12.11 SPECIAL SCREENING FOR MALIGNANT NEOPLASMS, COLON: ICD-10-CM

## 2020-01-03 DIAGNOSIS — R53.83 FATIGUE, UNSPECIFIED TYPE: ICD-10-CM

## 2020-01-03 DIAGNOSIS — Z12.31 VISIT FOR SCREENING MAMMOGRAM: ICD-10-CM

## 2020-01-03 DIAGNOSIS — R63.4 WEIGHT LOSS: ICD-10-CM

## 2020-01-03 LAB
ALBUMIN SERPL-MCNC: 3.2 G/DL (ref 3.4–5)
ALP SERPL-CCNC: 83 U/L (ref 40–150)
ALT SERPL W P-5'-P-CCNC: 13 U/L (ref 0–50)
ANION GAP SERPL CALCULATED.3IONS-SCNC: 5 MMOL/L (ref 3–14)
AST SERPL W P-5'-P-CCNC: 15 U/L (ref 0–45)
BASOPHILS # BLD AUTO: 0 10E9/L (ref 0–0.2)
BASOPHILS NFR BLD AUTO: 0.2 %
BILIRUB SERPL-MCNC: 0.4 MG/DL (ref 0.2–1.3)
BUN SERPL-MCNC: 6 MG/DL (ref 7–30)
CALCIUM SERPL-MCNC: 9 MG/DL (ref 8.5–10.1)
CHLORIDE SERPL-SCNC: 102 MMOL/L (ref 94–109)
CO2 SERPL-SCNC: 30 MMOL/L (ref 20–32)
CREAT SERPL-MCNC: 0.52 MG/DL (ref 0.52–1.04)
DIFFERENTIAL METHOD BLD: NORMAL
EOSINOPHIL # BLD AUTO: 0.2 10E9/L (ref 0–0.7)
EOSINOPHIL NFR BLD AUTO: 2.2 %
ERYTHROCYTE [DISTWIDTH] IN BLOOD BY AUTOMATED COUNT: 12.9 % (ref 10–15)
GFR SERPL CREATININE-BSD FRML MDRD: >90 ML/MIN/{1.73_M2}
GLUCOSE SERPL-MCNC: 88 MG/DL (ref 70–99)
HCT VFR BLD AUTO: 43.7 % (ref 35–47)
HGB BLD-MCNC: 14.1 G/DL (ref 11.7–15.7)
LYMPHOCYTES # BLD AUTO: 2.4 10E9/L (ref 0.8–5.3)
LYMPHOCYTES NFR BLD AUTO: 23.2 %
MCH RBC QN AUTO: 29.8 PG (ref 26.5–33)
MCHC RBC AUTO-ENTMCNC: 32.3 G/DL (ref 31.5–36.5)
MCV RBC AUTO: 92 FL (ref 78–100)
MONOCYTES # BLD AUTO: 1 10E9/L (ref 0–1.3)
MONOCYTES NFR BLD AUTO: 9.5 %
NEUTROPHILS # BLD AUTO: 6.7 10E9/L (ref 1.6–8.3)
NEUTROPHILS NFR BLD AUTO: 64.9 %
PLATELET # BLD AUTO: 169 10E9/L (ref 150–450)
POTASSIUM SERPL-SCNC: 3.7 MMOL/L (ref 3.4–5.3)
PROT SERPL-MCNC: 6.8 G/DL (ref 6.8–8.8)
RBC # BLD AUTO: 4.73 10E12/L (ref 3.8–5.2)
SODIUM SERPL-SCNC: 137 MMOL/L (ref 133–144)
TSH SERPL DL<=0.005 MIU/L-ACNC: 1.9 MU/L (ref 0.4–4)
WBC # BLD AUTO: 10.4 10E9/L (ref 4–11)

## 2020-01-03 PROCEDURE — G0008 ADMIN INFLUENZA VIRUS VAC: HCPCS | Performed by: FAMILY MEDICINE

## 2020-01-03 PROCEDURE — 99214 OFFICE O/P EST MOD 30 MIN: CPT | Mod: 25 | Performed by: FAMILY MEDICINE

## 2020-01-03 PROCEDURE — 80053 COMPREHEN METABOLIC PANEL: CPT | Performed by: FAMILY MEDICINE

## 2020-01-03 PROCEDURE — 36415 COLL VENOUS BLD VENIPUNCTURE: CPT | Performed by: FAMILY MEDICINE

## 2020-01-03 PROCEDURE — 84443 ASSAY THYROID STIM HORMONE: CPT | Performed by: FAMILY MEDICINE

## 2020-01-03 PROCEDURE — 85025 COMPLETE CBC W/AUTO DIFF WBC: CPT | Performed by: FAMILY MEDICINE

## 2020-01-03 PROCEDURE — 90662 IIV NO PRSV INCREASED AG IM: CPT | Performed by: FAMILY MEDICINE

## 2020-01-03 RX ORDER — IPRATROPIUM BROMIDE AND ALBUTEROL SULFATE 2.5; .5 MG/3ML; MG/3ML
1 SOLUTION RESPIRATORY (INHALATION) EVERY 6 HOURS PRN
Qty: 3 BOX | Refills: 3 | Status: SHIPPED | OUTPATIENT
Start: 2020-01-03

## 2020-01-03 RX ORDER — ROSUVASTATIN CALCIUM 40 MG/1
40 TABLET, COATED ORAL AT BEDTIME
Qty: 90 TABLET | Refills: 3 | Status: SHIPPED | OUTPATIENT
Start: 2020-01-03

## 2020-01-03 ASSESSMENT — MIFFLIN-ST. JEOR: SCORE: 1623.12

## 2020-01-03 NOTE — PATIENT INSTRUCTIONS
Use duoneb 3 times daily for the next 1-2 weeks, then at minimum twice daily  Start the prilosec from home, in AM on empty stomach  Sleep study  Colonoscopy  mammogram

## 2020-01-03 NOTE — PROGRESS NOTES
Subjective     Calista Atkinson is a 69 year old female who presents to clinic today for the following health issues:      COPD:  She presents for follow up of COPD.  Overall, COPD symptoms are slightly worse since last visit. She has same as usual fatigue or shortness of breath with exertion and same as usual shortness of breath at rest.  She often coughs and does not have change in sputum. No recent fever. She can walk less than 1 block without stopping to rest. She can not walk a flight of stairs without resting. The patient has had no ED, urgent care, or hospital admissions because of COPD since the last visit.     Hypertension: She presents for follow up of hypertension.  She does not check blood pressure  regularly outside of the clinic. Outpatient blood pressures have not been over 140/90. She does not follow a low salt diet.     Hyperlipidemia:  She presents for follow up of hyperlipidemia.  She is taking medication to lower cholesterol. She is not having myalgia or other side effects to statin medications.     Is not eating as much, less appetite than before, still eating soft foods, since surgery, this all started with loss of appetite and not eating much at all for 3 weeks. Down 45 #, s/p hysterectomy, the uterus weighed 10# and ovary was enlarged, left, was also large 6.6 cm, no cancer found.  Highest weight was 300, but was stable at about 277 prior to all this.    Coughing, feels tight, no fevers or chills, has albuteral but not yet used it    nexium is not needed, every since surgery, upper stomach is upset, worse with coughing, drinks coffee 3 per day, takes 81mg and is a smoker.         How many servings of fruits and vegetables do you eat daily?  0-1    On average, how many sweetened beverages do you drink each day (Examples: soda, juice, sweet tea, etc.  Do NOT count diet or artificially sweetened beverages)?   0    How many days per week do you miss taking your medication? 0    PROBLEMS TO ADD  "ON...    Recent Labs   Lab Test 11/27/19  0738 11/18/19  1433  10/17/19  1109 08/27/19  0855 04/18/19  0900 07/10/18  1034  02/08/17  0922   LDL  --   --   --   --  93  --  155*  --  83   HDL  --   --   --   --  47*  --  49*  --  54   TRIG  --   --   --   --  235*  --  274*  --  217*   ALT  --   --   --  10 13 13 18   < >  --    CR 0.50* 0.54   < > 1.33* 0.64 0.65 0.59   < >  --    GFRESTIMATED >90 >90   < > 40* >90 90 >90   < >  --    GFRESTBLACK >90 >90   < > 47* >90 >90 >90   < >  --    POTASSIUM 4.8 3.9   < > 2.5* 4.2 4.0 4.5   < >  --    TSH  --   --   --   --  2.05  --  2.25  --   --     < > = values in this interval not displayed.      BP Readings from Last 3 Encounters:   01/03/20 124/78   12/10/19 136/77   11/28/19 137/60    Wt Readings from Last 3 Encounters:   01/03/20 107 kg (236 lb)   12/10/19 108 kg (238 lb)   11/27/19 117 kg (257 lb 14.4 oz)                    Reviewed and updated as needed this visit by Provider         Review of Systems   ROS COMP: Constitutional, HEENT, cardiovascular, pulmonary, gi and gu systems are negative, except as otherwise noted.      Objective    Ht 1.702 m (5' 7\")   Wt 107 kg (236 lb)   BMI 36.96 kg/m    Body mass index is 36.96 kg/m .  Physical Exam   GENERAL: healthy, alert and no distress  EYES: Eyes grossly normal to inspection,  and conjunctivae and sclerae normal  HENT: ear canals and TM's normal, nose and mouth without ulcers or lesions  NECK: no adenopathy, no asymmetry, masses, or scars and thyroid normal to palpation  RESP: lungs clear to auscultation - no rales, rhonchi or wheezes  CV: regular rate and rhythm, normal S1 S2, no S3 or S4, no murmur, click or rub, no peripheral edema and peripheral pulses strong  ABDOMEN: soft, nontender, no hepatosplenomegaly, no masses and bowel sounds normal, some epigastric tenderness, no rebound or guarding  MS: no gross musculoskeletal defects noted, no edema  SKIN: no suspicious lesions or rashes  NEURO: Normal strength " and tone, mentation intact and speech normal    Diagnostic Test Results:  Labs reviewed in Epic        Assessment & Plan     1. Mixed hyperlipidemia  Refilled, doing well  - rosuvastatin (CRESTOR) 40 MG tablet; Take 1 tablet (40 mg) by mouth At Bedtime  Dispense: 90 tablet; Refill: 3    2. Chronic obstructive pulmonary disease, unspecified COPD type (H)  Not using her inhaler, prefers neb, will order duoneb  - ipratropium - albuterol 0.5 mg/2.5 mg/3 mL (DUONEB) 0.5-2.5 (3) MG/3ML neb solution; Take 1 vial (3 mLs) by nebulization every 6 hours as needed for shortness of breath / dyspnea or wheezing  Dispense: 3 Box; Refill: 3    3. Weight loss  Work up started and reviewed imaging in chart, still due for c-scope and mammo  - TSH with free T4 reflex  - Comprehensive metabolic panel  - CBC with platelets differential    4. Fatigue, unspecified type  Work up started, denies depression  - TSH with free T4 reflex  - Comprehensive metabolic panel  - CBC with platelets differential  - SLEEP EVALUATION & MANAGEMENT REFERRAL - ADULT -[facility]  [phone] (Age 18 and up); Future    5. Gastroesophageal reflux disease, esophagitis presence not specified  Restart, take daily  - omeprazole (PRILOSEC) 20 MG DR capsule; Take 1 capsule (20 mg) by mouth daily  Dispense: 30 capsule; Refill: 11    6. Special screening for malignant neoplasms, colon  Pt will get this done, is reluctant at first  - GASTROENTEROLOGY ADULT REF PROCEDURE ONLY Etienne Olguin [phone]; Dr. Ch    7. Visit for screening mammogram    - MA Screening Digital Bilateral; Future    8. Need for prophylactic vaccination and inoculation against influenza    - INFLUENZA (HIGH DOSE) 3 VALENT VACCINE [14898]  - Vaccine Administration, Initial [51890]       See Patient Instructions    Return in about 1 month (around 2/3/2020) for weight check/copd.    Twyla Nick MD  [facility]

## 2020-01-09 ENCOUNTER — HOSPITAL ENCOUNTER (OUTPATIENT)
Dept: MAMMOGRAPHY | Facility: CLINIC | Age: 70
Discharge: HOME | End: 2020-01-09
Attending: FAMILY MEDICINE | Admitting: FAMILY MEDICINE
Payer: COMMERCIAL

## 2020-01-09 DIAGNOSIS — Z12.31 VISIT FOR SCREENING MAMMOGRAM: ICD-10-CM

## 2020-01-09 PROCEDURE — 77067 SCR MAMMO BI INCL CAD: CPT

## 2020-01-23 ENCOUNTER — HOSPITAL ENCOUNTER (OUTPATIENT)
Facility: CLINIC | Age: 70
Discharge: HOME | End: 2020-01-23
Attending: INTERNAL MEDICINE | Admitting: INTERNAL MEDICINE
Payer: COMMERCIAL

## 2020-01-23 VITALS
RESPIRATION RATE: 16 BRPM | OXYGEN SATURATION: 96 % | WEIGHT: 236 LBS | DIASTOLIC BLOOD PRESSURE: 64 MMHG | HEIGHT: 67 IN | SYSTOLIC BLOOD PRESSURE: 126 MMHG | HEART RATE: 64 BPM | BODY MASS INDEX: 37.04 KG/M2

## 2020-01-23 LAB — COLONOSCOPY: NORMAL

## 2020-01-23 PROCEDURE — 88305 TISSUE EXAM BY PATHOLOGIST: CPT | Performed by: INTERNAL MEDICINE

## 2020-01-23 PROCEDURE — 88305 TISSUE EXAM BY PATHOLOGIST: CPT | Mod: 26 | Performed by: INTERNAL MEDICINE

## 2020-01-23 PROCEDURE — 25000128 H RX IP 250 OP 636: Performed by: INTERNAL MEDICINE

## 2020-01-23 PROCEDURE — G0500 MOD SEDAT ENDO SERVICE >5YRS: HCPCS | Performed by: INTERNAL MEDICINE

## 2020-01-23 PROCEDURE — 45385 COLONOSCOPY W/LESION REMOVAL: CPT | Performed by: INTERNAL MEDICINE

## 2020-01-23 PROCEDURE — 45382 COLONOSCOPY W/CONTROL BLEED: CPT | Performed by: INTERNAL MEDICINE

## 2020-01-23 PROCEDURE — 99153 MOD SED SAME PHYS/QHP EA: CPT

## 2020-01-23 RX ORDER — ONDANSETRON 2 MG/ML
4 INJECTION INTRAMUSCULAR; INTRAVENOUS
Status: DISCONTINUED | OUTPATIENT
Start: 2020-01-23 | End: 2020-01-23 | Stop reason: HOSPADM

## 2020-01-23 RX ORDER — NALOXONE HYDROCHLORIDE 0.4 MG/ML
.1-.4 INJECTION, SOLUTION INTRAMUSCULAR; INTRAVENOUS; SUBCUTANEOUS
Status: DISCONTINUED | OUTPATIENT
Start: 2020-01-23 | End: 2020-01-23 | Stop reason: HOSPADM

## 2020-01-23 RX ORDER — FLUMAZENIL 0.1 MG/ML
0.2 INJECTION, SOLUTION INTRAVENOUS
Status: DISCONTINUED | OUTPATIENT
Start: 2020-01-23 | End: 2020-01-23 | Stop reason: HOSPADM

## 2020-01-23 RX ORDER — ONDANSETRON 2 MG/ML
4 INJECTION INTRAMUSCULAR; INTRAVENOUS EVERY 6 HOURS PRN
Status: DISCONTINUED | OUTPATIENT
Start: 2020-01-23 | End: 2020-01-23 | Stop reason: HOSPADM

## 2020-01-23 RX ORDER — ONDANSETRON 4 MG/1
4 TABLET, ORALLY DISINTEGRATING ORAL EVERY 6 HOURS PRN
Status: DISCONTINUED | OUTPATIENT
Start: 2020-01-23 | End: 2020-01-23 | Stop reason: HOSPADM

## 2020-01-23 RX ORDER — LIDOCAINE 40 MG/G
CREAM TOPICAL
Status: DISCONTINUED | OUTPATIENT
Start: 2020-01-23 | End: 2020-01-23 | Stop reason: HOSPADM

## 2020-01-23 RX ORDER — FENTANYL CITRATE 50 UG/ML
INJECTION, SOLUTION INTRAMUSCULAR; INTRAVENOUS PRN
Status: DISCONTINUED | OUTPATIENT
Start: 2020-01-23 | End: 2020-01-23 | Stop reason: HOSPADM

## 2020-01-23 ASSESSMENT — MIFFLIN-ST. JEOR: SCORE: 1623.12

## 2020-01-23 NOTE — H&P
Pre-Endoscopy History and Physical     Calista Atknison MRN# [id number]   YOB: 1950 Age: 69 year old     Date of Procedure: 1/23/2020  Primary care provider: Twyla Nick  Type of Endoscopy: Colonoscopy with possible biopsy, possible polypectomy  Reason for Procedure: screen  Type of Anesthesia Anticipated: Conscious Sedation    HPI:    Calista is a 69 year old female who will be undergoing the above procedure.      A history and physical has been performed. The patient's medications and allergies have been reviewed. The risks and benefits of the procedure and the sedation options and risks were discussed with the patient.  All questions were answered and informed consent was obtained.      She denies a personal or family history of anesthesia complications or bleeding disorders.     Patient Active Problem List   Diagnosis     GERD (gastroesophageal reflux disease)     Tobacco use disorder     Peripheral edema     CAD (coronary artery disease)     Carotid artery plaque     S/P carotid endarterectomy     Hyperlipidemia with target LDL less than 70     History of CVA (cerebrovascular accident)     COPD (chronic obstructive pulmonary disease) (H)     Morbid obesity (H)     Hypokalemia     Pelvic mass     Enlarged uterus     Benign essential hypertension     Depression     Dyslipidemia     Knee pain, right     TIA (transient ischemic attack)     Venous insufficiency     S/P hysterectomy        Past Medical History:   Diagnosis Date     CAD (coronary artery disease) 6/2012    STEMI and stent      Carotid artery plaque 1/9/2015     COPD (chronic obstructive pulmonary disease) (H)      CVA (cerebral infarction) 2012     Gastro-oesophageal reflux disease      Hyperlipidemia      Hypertension      Myocardial infarction (H) 2012     S/P carotid endarterectomy 1/9/2015        Past Surgical History:   Procedure Laterality Date     ANGIOGRAM  6/6/12    thrombectomy and stent RCA     CARDIAC SURGERY         SECTION       ENDARTERECTOMY CAROTID Left 3/27/12     HEART CATH LEFT HEART CATH  03/02/15    Evidence of old MI, Continued good results of the stent in mid RCA with mild disease elsewhere in the RCA, Disease in very small branch of the distal CFX.      HYSTERECTOMY TOTAL ABDOMINAL, BILATERAL SALPINGO-OOPHORECTOMY, NODE DISSECTION, COMBINED Bilateral 2019    Procedure: Exploratory laparotomy, removal of uterus, cervix, both ovaries and fallopian tubes;  Surgeon: Natalia Us MD;  Location: [facility] OR     TONSILLECTOMY      age 9       Social History     Tobacco Use     Smoking status: Current Every Day Smoker     Packs/day: 0.25     Types: Cigarettes     Smokeless tobacco: Never Used     Tobacco comment: 4-5 cig daily.    Substance Use Topics     Alcohol use: No     Alcohol/week: 0.0 standard drinks       Family History   Problem Relation Age of Onset     Myocardial Infarction Mother      Hypertension Mother      Prostate Cancer Father      Cancer Father         Lymphnode     Cancer - colorectal Paternal Grandfather      Colon Cancer Paternal Grandfather      Alcohol/Drug Son         Alcoholic     Hypertension Daughter      Hyperlipidemia Daughter      Myocardial Infarction Brother      Respiratory Brother         Pneumonia      Other - See Comments Brother         work accident        Prior to Admission medications    Medication Sig Start Date End Date Taking? Authorizing Provider   acetaminophen (TYLENOL) 325 MG tablet Take 2 tablets (650 mg) by mouth every 6 hours as needed for mild pain 19  Yes Natalia Us MD   acetaminophen (TYLENOL) 500 MG tablet Take 1,000 mg by mouth At Bedtime   Yes Unknown, Entered By History   albuterol (2.5 MG/3ML) 0.083% neb solution Take 1 vial (2.5 mg) by nebulization every 6 hours as needed for shortness of breath / dyspnea or wheezing  Patient taking differently: Take 1 vial by nebulization every 6 hours as needed for shortness of breath /  "dyspnea or wheezing (Pt last used 11/17/19 in the ER. 11/18/19)  3/3/17  Yes Twyla Nick MD   albuterol (PROAIR HFA/PROVENTIL HFA/VENTOLIN HFA) 108 (90 BASE) MCG/ACT Inhaler Inhale 2 puffs into the lungs every 6 hours as needed for shortness of breath / dyspnea or wheezing 3/3/17  Yes Twyla Nick MD   aspirin 81 MG EC tablet Take 81 mg by mouth every morning    Yes Unknown, Entered By History   ibuprofen (ADVIL/MOTRIN) 600 MG tablet Take 1 tablet (600 mg) by mouth every 6 hours as needed for moderate pain 11/28/19  Yes Natalia Us MD   ipratropium - albuterol 0.5 mg/2.5 mg/3 mL (DUONEB) 0.5-2.5 (3) MG/3ML neb solution Take 1 vial (3 mLs) by nebulization every 6 hours as needed for shortness of breath / dyspnea or wheezing 1/3/20  Yes Twyla Nick MD   metoprolol tartrate (LOPRESSOR) 25 MG tablet Take 1 tablet (25 mg) by mouth 2 times daily 4/16/19  Yes Anny Peterson APRN CNP   omeprazole (PRILOSEC) 20 MG DR capsule Take 1 capsule (20 mg) by mouth daily 1/3/20  Yes Twyla Nick MD   rosuvastatin (CRESTOR) 40 MG tablet Take 1 tablet (40 mg) by mouth At Bedtime 1/3/20  Yes Twyla Nick MD       No Known Allergies     REVIEW OF SYSTEMS:   5 point ROS negative except as noted above in HPI, including Gen., Resp., CV, GI &  system review.    PHYSICAL EXAM:   There were no vitals taken for this visit. Estimated body mass index is 36.96 kg/m  as calculated from the following:    Height as of 1/3/20: 1.702 m (5' 7\").    Weight as of 1/3/20: 107 kg (236 lb).   GENERAL APPEARANCE: alert, and oriented  MENTAL STATUS: alert  AIRWAY EXAM: Mallampatti Class I (visualization of the soft palate, fauces, uvula, anterior and posterior pillars)  RESP: lungs clear to auscultation - no rales, rhonchi or wheezes  CV: regular rates and rhythm  DIAGNOSTICS:    Not indicated    IMPRESSION   ASA Class 2 - Mild systemic disease    PLAN:   Plan for Colonoscopy with " possible biopsy, possible polypectomy. We discussed the risks, benefits and alternatives and the patient wished to proceed.    The above has been forwarded to the consulting provider.      Signed Electronically by: Ed Ch MD  January 23, 2020

## 2020-01-23 NOTE — LETTER
January 7, 2020      Calista Atkinson  [address] [address]        Dear Calista,         Please be aware that coverage of these services is subject to the terms and limitations of your health insurance plan.  Call member services at your health plan with any benefit or coverage questions.    Thank you for choosing [facility]. You are scheduled for the following service(s):    Date:  1/23/20             Procedure:  COLONOSCOPY  Doctor:        Jing   Arrival Time:  0930  *Check in at Emergency/Endoscopy desk*  Procedure Time:  1000      Location:   [facility]        Endoscopy Department, First Floor (Enter through ER Doors) *        [address] [place], [place] [address]      [phone] or [phone] () to reschedule      MIRALAX -GATORADE  PREP  Colonoscopy is the most accurate test to detect colon polyps and colon cancer; and the only test where polyps can be removed. During this procedure, a doctor examines the lining of your large intestine and rectum through a flexible tube.   Transportation  You must arrange for a ride for the day of your procedure with a responsible adult. A taxi , Uber, etc, is not an option unless you are accompanied by a responsible adult. If you fail to arrange transportation with a responsible adult, your procedure will be cancelled and rescheduled.    Purchase the  following supplies at your local pharmacy:  - 2 (two) bisacodyl tablets: each tablet contains 5 mg.  (Dulcolax  laxative NOT Dulcolax  stool softener)   - 1 (one) 8.3 oz bottle of Polyethylene Glycol (PEG) 3350 Powder   (MiraLAX , Smooth LAX , ClearLAX  or equivalent)  - 64 oz Gatorade    Regular Gatorade, Gatorade G2 , Powerade , Powerade Zero  or Pedialyte  is acceptable. Red colored flavors are not allowed; all other colors (yellow, green, orange, purple and blue) are okay. It is also okay to buy two 2.12 oz packets of powdered Gatorade  that can be mixed with water to a total volume of 64 oz of liquid.  - 1 (one) 10 oz bottle of Magnesium Citrate (Red colored flavors are not allowed)  It is also okay for you to use a 0.5 oz package of powdered magnesium citrate (17 g) mixed with 10 oz of water.      PREPARATION FOR COLONOSCOPY    7 days before:    Discontinue fiber supplements and medications containing iron. This includes Metamucil  and Fibercon ; and multivitamins with iron.    3 days before:    Begin a low-fiber diet. A low-fiber diet helps making the cleanout more effective.     Examples of a low-fiber diet include (but are not limited to): white bread, white rice, pasta, crackers, fish, chicken, eggs, ground beef, creamy peanut butter, cooked/steamed/boiled vegetables, canned fruit, bananas, melons, milk, plain yogurt cheese, salad dressing and other condiments.     The following are not allowed on a low-fiber diet: seeds, nuts, popcorn, bran, whole wheat, corn, quinoa, raw fruits and vegetables, berries and dried fruit, beans and lentils.    For additional details on low-fiber diet, please refer to the table on the last page.    2 days before:    Continue the low-fiber diet.     Drink at least 8 glasses of water throughout the day.     Stop eating solid foods at 11:45 pm.    1 day before:    In the morning: begin a clear liquid diet (liquids you can see through).     Examples of a clear liquid diet include: water, clear broth or bouillon, Gatorade, Pedialyte or Powerade, carbonated and non-carbonated soft drinks (Sprite , 7-Up , ginger ale), strained fruit juices without pulp (apple, white grape, white cranberry), Jell-O  and popsicles.     The following are not allowed on a clear liquid diet: red liquids, alcoholic beverages, dairy products (milk, creamer, and yogurt), protein shakes, creamy broths, juice with pulp and chewing tobacco.    At noon: take 2 (two) bisacodyl tablets     At 4 (and no later than 6pm): start drinking the  Miralax-Gatorade preparation (8.3 oz of Miralax mixed with 64 oz of Gatorade in a large pitcher). Drink 1(one) 8 oz glass every 15 minutes thereafter, until the mixture is gone.    COLON CLEANSING TIPS: drink adequate amounts of fluids before and after your colon cleansing to prevent dehydration. Stay near a toilet because you will have diarrhea. Even if you are sitting on the toilet, continue to drink the cleansing solution every 15 minutes. If you feel nauseous or vomit, rinse your mouth with water, take a 15 to 30-minute-break and then continue drinking the solution. You will be uncomfortable until the stool has flushed from your colon (in about 2 to 4 hours). You may feel chilled.    Day of your procedure  You may take all of your morning medications including blood pressure medications, blood thinners (if you have not been instructed to stop these by our office), methadone, anti-seizure medications with sips of water 3 hours prior to your procedure or earlier. Do not take insulin or vitamins prior to your procedure. Continue the clear liquid diet.       4 hours prior: drink 10 oz of magnesium citrate. It may be easier to drink it with a straw.    STOP consuming all liquids after that.     Do not take anything by mouth during this time.     Allow extra time to travel to your procedure as you may need to stop and use a restroom along the way.    You are ready for the procedure, if you followed all instructions and your stool is no longer formed, but clear or yellow liquid. If you are unsure whether your colon is clean, please call our office at [phone] before you leave for your appointment.    Bring the following to your procedure:  - Insurance Card/Photo ID.   - List of current medications including over-the-counter medications and supplements.   - Your rescue inhaler if you currently use one to control asthma.    Canceling or rescheduling your appointment:   If you must cancel or reschedule your  appointment, please call [phone] as soon as possible.      COLONOSCOPY PRE-PROCEDURE CHECKLIST    If you have diabetes, ask your regular doctor for diet and medication restrictions.  If you take an anticoagulant or anti-platelet medication (such as Coumadin , Lovenox , Pradaxa , Xarelto , Eliquis , etc.), please call your primary doctor for advice on holding this medication.  If you take aspirin you may continue to do so.  If you are or may be pregnant, please discuss the risks and benefits of this procedure with your doctor.        What happens during a colonoscopy?    Plan to spend up to two hours, starting at registration time, at the endoscopy center the day of your procedure. The colonoscopy takes an average of 15 to 30 minutes. Recovery time is about 30 minutes.      Before the exam:    You will change into a gown.    Your medical history and medication list will be reviewed with you, unless that has been done over the phone prior to the procedure.     A nurse will insert an intravenous (IV) line into your hand or arm.    The doctor will meet with you and will give you a consent form to sign.  During the exam:     Medicine will be given through the IV line to help you relax.     Your heart rate and oxygen levels will be monitored. If your blood pressure is low, you may be given fluids through the IV line.     The doctor will insert a flexible hollow tube, called a colonoscope, into your rectum. The scope will be advanced slowly through the large intestine (colon).    You may have a feeling of fullness or pressure.     If an abnormal tissue or a polyp is found, the doctor may remove it through the endoscope for closer examination, or biopsy. Tissue removal is painless    After the exam:           Any tissue samples removed during the exam will be sent to a lab for evaluation. It may take 5-7 working days for you to be notified of the results.     A nurse will provide you with complete discharge  instructions before you leave the endoscopy center. Be sure to ask the nurse for specific instructions if you take blood thinners such as Aspirin, Coumadin or Plavix.     The doctor will prepare a full report for you and for the physician who referred you for the procedure.     Your doctor will talk with you about the initial results of your exam.      Medication given during the exam will prohibit you from driving for the rest of the day.     Following the exam, you may resume your normal diet. Your first meal should be light, no greasy foods. Avoid alcohol until the next day.     You may resume your regular activities the day after the procedure.         LOW-FIBER DIET    Foods RECOMMENDED Foods to AVOID   Breads, Cereal, Rice and Pasta:   White bread, rolls, biscuits, croissant and davy toast.   Waffles, Kyrgyz toast and pancakes.   White rice, noodles, pasta, macaroni and peeled cooked potatoes.   Plain crackers and saltines.   Cooked cereals: farina, cream of rice.   Cold cereals: Puffed Rice , Rice Krispies , Corn Flakes  and Special K    Breads, Cereal, Rice and Pasta:   Breads or rolls with nuts, seeds or fruit.   Whole wheat, pumpernickel, rye breads and cornbread.   Potatoes with skin, brown or wild rice, and kasha (buckwheat).     Vegetables:   Tender cooked and canned vegetables without seeds: carrots, asparagus tips, green or wax beans, pumpkin, spinach, lima beans. Vegetables:   Raw or steamed vegetables.   Vegetables with seeds.   Sauerkraut.   Winter squash, peas, broccoli, Brussel sprouts, cabbage, onions, cauliflower, baked beans, peas and corn.   Fruits:   Strained fruit juice.   Canned fruit, except pineapple.   Ripe bananas and melon. Fruits:   Prunes and prune juice.   Raw fruits.   Dried fruits: figs, dates and raisins.   Milk/Dairy:   Milk: plain or flavored.   Yogurt, custard and ice cream.   Cheese and cottage cheese Milk/Dairy:     Meat and other proteins:   ground, well-cooked tender  beef, lamb, ham, veal, pork, fish, poultry and organ meats.   Eggs.   Peanut butter without nuts. Meat and other proteins:   Tough, fibrous meats with gristle.   Dry beans, peas and lentils.   Peanut butter with nuts.   Tofu.   Fats, Snack, Sweets, Condiments and Beverages:   Margarine, butter, oils, mayonnaise, sour cream and salad dressing, plain gravy.   Sugar, hard candy, clear jelly, honey and syrup.   Spices, cooked herbs, bouillon, broth and soups made with allowed vegetable, ketchup and mustard.   Coffee, tea and carbonated drinks.   Plain cakes, cookies and pretzels.   Gelatin, plain puddings, custard, ice cream, sherbet and popsicles. Fats, Snack, Sweets, Condiments and Beverages:   Nuts, seeds and coconut.   Jam, marmalade and preserves.   Pickles, olives, relish and horseradish.   All desserts containing nuts, seeds, dried fruit and coconut; or made from whole grains or bran.   Candy made with nuts or seeds.   Popcorn.           DIRECTIONS TO THE ENDOSCOPY DEPARTMENT     From the north ([facility])  Take [address] South, exit on [address] [address]. Get into the left hand kathy, turn left (east), go one-half mile to [address] and turn left. Go north to the first stoplight, take a right on [address] and follow it to the Emergency entrance.    From the south ([facility])  Take [address]N to the [address] split and exit on [address] [address]. On [address] [address], turn left (west) to [address]. Turn right (north) on [address]. Go north to the first stoplight, take a right on [address] and follow it to the Emergency entrance.    From the east via 35E ([facility])  Take [address] south to [address] [address] exit. Turn right on [address] [address]. Go west to [address]. Turn right (north) on [address]. Go to the first stoplight, take a right and follow on [address] to the Emergency entrance.    From the east via [address] ([facility])  [address] to  [address]. Turn left (south) on [address] to [address]. Turn left (east) on [address] and follow it to the Emergency entrance.    From the west via [address] ([place])  Take [address] east to [address]. Turn right (south) on [address] to [address]. Turn left (east) on [address] and follow it to the Emergency entrance.

## 2020-01-23 NOTE — DISCHARGE INSTRUCTIONS

## 2020-01-24 LAB — COPATH REPORT: NORMAL

## 2020-04-18 DIAGNOSIS — I10 ESSENTIAL HYPERTENSION: ICD-10-CM

## 2020-04-20 NOTE — TELEPHONE ENCOUNTER
Routing refill request to provider for review/approval because:  A break in medication    Qiana Howard, RN   [facility] -- Triage Nurse

## 2020-04-21 RX ORDER — METOPROLOL TARTRATE 25 MG/1
TABLET, FILM COATED ORAL
Qty: 180 TABLET | Refills: 0 | Status: SHIPPED | OUTPATIENT
Start: 2020-04-21 | End: 2020-08-10

## 2020-04-27 ENCOUNTER — TELEPHONE (OUTPATIENT)
Dept: FAMILY MEDICINE | Facility: CLINIC | Age: 70
End: 2020-04-27

## 2020-04-27 NOTE — TELEPHONE ENCOUNTER
esomeprazole (NEXIUM) 40 MG DR capsule  (Discontinued)      Last Written Prescription Date:  10/18/19    END:1/3/2020  Reason for Discontinue: None   Last Fill Quantity: 30,   # refills: 0  Last Office Visit with [facility], [facility] or [facility] prescribing provider: 1/3/2020  Future Office Visit:

## 2020-04-27 NOTE — TELEPHONE ENCOUNTER
Phone call to patient     This may have been auto request     Patient is currently taking omeprazole and there are refills at the [facility] pharmacy. She will call to request fill     Liudmila Delgadillo Registered Nurse   [facility]

## 2020-07-02 ENCOUNTER — TELEPHONE (OUTPATIENT)
Dept: FAMILY MEDICINE | Facility: CLINIC | Age: 70
End: 2020-07-02

## 2020-07-02 NOTE — TELEPHONE ENCOUNTER
Patient calls, requesting omeprazole 20 mg, order was sent by Dr. Nick (20 mg, Quantity-30, take qday, po 11 refills) in 1/2020, pharmacy states they never received this order, this nurse called patients Pharmacy and gave verbal order to refill same prescription (20 mg, Quantity-30, take qday, po, 5 refills)    omeprazole (PRILOSEC) 20 MG DR capsule  30 capsule  11  1/3/2020   --    Sig - Route: Take 1 capsule (20 mg) by mouth daily - Oral    Class: OTC    Order: [id number]    Printout Tracking     External Result Report    Pharmacy     [facility] - [place], [place] - [address]      Informed patient       Connor Garces RN

## 2020-09-30 ENCOUNTER — TELEPHONE (OUTPATIENT)
Dept: OTHER | Facility: CLINIC | Age: 70
End: 2020-09-30

## 2020-09-30 NOTE — TELEPHONE ENCOUNTER
Follow up orders removed.  Mary HANEY BSN  [facility]  [phone]

## 2020-09-30 NOTE — TELEPHONE ENCOUNTER
Calista has moved to [facility] and states she will find a new provider up there.    She does not want further reminders.     Orders are placed for Bel Robledo on her chart.     Valarie LOPEZ

## 2020-12-20 ENCOUNTER — HEALTH MAINTENANCE LETTER (OUTPATIENT)
Age: 70
End: 2020-12-20

## 2021-02-15 DIAGNOSIS — K21.9 GASTROESOPHAGEAL REFLUX DISEASE: ICD-10-CM

## 2021-02-15 NOTE — TELEPHONE ENCOUNTER
"Requested Prescriptions   Pending Prescriptions Disp Refills     omeprazole (PRILOSEC) 20 MG DR capsule [Pharmacy Med Name: Omeprazole 20 MG Oral Capsule Delayed Release]    Last Written Prescription Date:  01/03/2020  Last Fill Quantity: 30 capsule,  # refills: 11   Last office visit: 1/3/2020 with prescribing provider:  Sandoval   Future Office Visit:       30 capsule 0     Sig: Take 1 capsule by mouth once daily       PPI Protocol Failed - 2/15/2021 12:30 PM        Failed - Recent (12 mo) or future (30 days) visit within the authorizing provider's specialty     Patient has had an office visit with the authorizing provider or a provider within the authorizing providers department within the previous 12 mos or has a future within next 30 days. See \"Patient Info\" tab in inbasket, or \"Choose Columns\" in Meds & Orders section of the refill encounter.              Passed - Not on Clopidogrel (unless Pantoprazole ordered)        Passed - No diagnosis of osteoporosis on record        Passed - Medication is active on med list        Passed - Patient is age 18 or older        Passed - No active pregnacy on record        Passed - No positive pregnancy test in past 12 months              "

## 2021-02-16 NOTE — NURSING NOTE
Flu shot and Pneumonia 23 injections given.  See Immunization section for details.  Lisa Magill, CMA     negative

## 2021-02-17 NOTE — TELEPHONE ENCOUNTER
LMTCB- there is a note that pt moved and will find another doc. Just trying to confirm.     Looks like she is due for appt w/ dr. stringer if staying.     Marissa LOPEZ RN

## 2021-10-03 ENCOUNTER — HEALTH MAINTENANCE LETTER (OUTPATIENT)
Age: 71
End: 2021-10-03

## 2022-01-23 ENCOUNTER — HEALTH MAINTENANCE LETTER (OUTPATIENT)
Age: 72
End: 2022-01-23

## 2022-03-20 ENCOUNTER — HEALTH MAINTENANCE LETTER (OUTPATIENT)
Age: 72
End: 2022-03-20

## 2022-09-11 ENCOUNTER — HEALTH MAINTENANCE LETTER (OUTPATIENT)
Age: 72
End: 2022-09-11

## 2022-11-07 NOTE — TELEPHONE ENCOUNTER
Panel Management Review      Patient has the following on her problem list:     Hypertension   Last three blood pressure readings:  BP Readings from Last 3 Encounters:   07/02/19 114/70   06/04/19 110/72   05/07/19 120/74     Blood pressure: Passed    HTN Guidelines:  Less than 140/90      Composite cancer screening  Chart review shows that this patient is due/due soon for the following Fecal Colorectal (FIT)  Summary:    Patient is due/failing the following:   FIT and PHYSICAL    Action needed:   Patient needs office visit for Medicare Annual Wellness visit.    Type of outreach:    Sent [employer] message.    Questions for provider review:    None                                                                                                                                    Lisa Magill, [facility]       Chart routed to Care Team .          
Pt has upcoming appt  
Attending Attestation (For Attendings USE Only)...
normal...

## 2023-04-30 ENCOUNTER — HEALTH MAINTENANCE LETTER (OUTPATIENT)
Age: 73
End: 2023-04-30

## (undated) DEVICE — SYR BULB IRRIG 50ML LATEX FREE 0035280

## (undated) DEVICE — ADH SKIN CLOSURE PREMIERPRO EXOFIN 1.0ML 3470

## (undated) DEVICE — GLOVE PROTEXIS POWDER FREE SMT 6.5  2D72PT65X

## (undated) DEVICE — Device

## (undated) DEVICE — SU VICRYL 3-0 SH 27" UND J416H

## (undated) DEVICE — SU VICRYL 2-0 CT-2 27" J333H

## (undated) DEVICE — CLIP HORIZON LG ORANGE 004200

## (undated) DEVICE — WIPES FOLEY CARE SURESTEP PROVON DFC100

## (undated) DEVICE — BLADE CLIPPER SGL USE 9680

## (undated) DEVICE — DRAPE MAYO STAND 23X54 8337

## (undated) DEVICE — SU VICRYL 2-0 CT-1 27" UND J259H

## (undated) DEVICE — DRAPE LEGGINGS CLEAR 8430

## (undated) DEVICE — SU VICRYL 3-0 FS-1 27" J442H

## (undated) DEVICE — SU VICRYL 2-0 CT-2 27" UND J269H

## (undated) DEVICE — PACK GOWN 3/PK DISP XL SBA32GPFCB

## (undated) DEVICE — ENDO SNARE EXACTO COLD 9MM LOOP 2.4MMX230CM 00711115

## (undated) DEVICE — PANTIES MESH LG/XLG 2PK 706M2

## (undated) DEVICE — SU VICRYL 0 CT-1 27" UND J260H

## (undated) DEVICE — PREP TECHNI-CARE CHLOROXYLENOL 3% 4OZ BOTTLE C222-4ZWO

## (undated) DEVICE — SPONGE LAP 18X36" 422

## (undated) DEVICE — KIT ENDO TURNOVER/PROCEDURE W/CLEAN A SCOPE LINERS 103888

## (undated) DEVICE — PACK AB HYST II

## (undated) DEVICE — GLOVE PROTEXIS BLUE W/NEU-THERA 7.0  2D73EB70

## (undated) DEVICE — SU VICRYL 0 TIE 54" J608H

## (undated) DEVICE — PAD PERI INDIV WRAP 11" 2022A

## (undated) DEVICE — SU PDS II 0 TP-1 60" Z991G

## (undated) DEVICE — CLIP ENDO RESOLUTION 2.8MMX235CM M00522610

## (undated) DEVICE — PREP CHLORAPREP 26ML TINTED ORANGE  260815

## (undated) DEVICE — GOWN LG DISP 9515

## (undated) DEVICE — WIPE PREMOIST CLEANSING WASHCLOTHS 7988

## (undated) DEVICE — LINEN TOWEL PACK X6 WHITE 5487

## (undated) DEVICE — ENDO TRAP POLYP QUICK CATCH 710201

## (undated) DEVICE — LINEN TOWEL PACK X30 5481

## (undated) DEVICE — SOL WATER IRRIG 1000ML BOTTLE 2F7114

## (undated) DEVICE — SU VICRYL 0 CT-2 27" J334H

## (undated) RX ORDER — FENTANYL CITRATE 50 UG/ML
INJECTION, SOLUTION INTRAMUSCULAR; INTRAVENOUS
Status: DISPENSED
Start: 2019-11-26

## (undated) RX ORDER — HEPARIN SODIUM 5000 [USP'U]/.5ML
INJECTION, SOLUTION INTRAVENOUS; SUBCUTANEOUS
Status: DISPENSED
Start: 2019-11-26

## (undated) RX ORDER — LIDOCAINE HYDROCHLORIDE 20 MG/ML
INJECTION, SOLUTION EPIDURAL; INFILTRATION; INTRACAUDAL; PERINEURAL
Status: DISPENSED
Start: 2019-11-26

## (undated) RX ORDER — PHENYLEPHRINE HCL IN 0.9% NACL 1 MG/10 ML
SYRINGE (ML) INTRAVENOUS
Status: DISPENSED
Start: 2019-11-26

## (undated) RX ORDER — ONDANSETRON 2 MG/ML
INJECTION INTRAMUSCULAR; INTRAVENOUS
Status: DISPENSED
Start: 2019-11-26

## (undated) RX ORDER — SODIUM CHLORIDE, SODIUM LACTATE, POTASSIUM CHLORIDE, CALCIUM CHLORIDE 600; 310; 30; 20 MG/100ML; MG/100ML; MG/100ML; MG/100ML
INJECTION, SOLUTION INTRAVENOUS
Status: DISPENSED
Start: 2019-11-26

## (undated) RX ORDER — PROPOFOL 10 MG/ML
INJECTION, EMULSION INTRAVENOUS
Status: DISPENSED
Start: 2019-11-26

## (undated) RX ORDER — EPHEDRINE SULFATE 50 MG/ML
INJECTION, SOLUTION INTRAMUSCULAR; INTRAVENOUS; SUBCUTANEOUS
Status: DISPENSED
Start: 2019-11-26

## (undated) RX ORDER — GLYCOPYRROLATE 0.2 MG/ML
INJECTION, SOLUTION INTRAMUSCULAR; INTRAVENOUS
Status: DISPENSED
Start: 2019-11-26

## (undated) RX ORDER — HYDROMORPHONE HYDROCHLORIDE 1 MG/ML
INJECTION, SOLUTION INTRAMUSCULAR; INTRAVENOUS; SUBCUTANEOUS
Status: DISPENSED
Start: 2019-11-26

## (undated) RX ORDER — DEXAMETHASONE SODIUM PHOSPHATE 4 MG/ML
INJECTION, SOLUTION INTRA-ARTICULAR; INTRALESIONAL; INTRAMUSCULAR; INTRAVENOUS; SOFT TISSUE
Status: DISPENSED
Start: 2019-11-26

## (undated) RX ORDER — IPRATROPIUM BROMIDE AND ALBUTEROL SULFATE 2.5; .5 MG/3ML; MG/3ML
SOLUTION RESPIRATORY (INHALATION)
Status: DISPENSED
Start: 2019-11-26

## (undated) RX ORDER — CEFAZOLIN SODIUM 2 G/100ML
INJECTION, SOLUTION INTRAVENOUS
Status: DISPENSED
Start: 2019-11-26

## (undated) RX ORDER — GABAPENTIN 300 MG/1
CAPSULE ORAL
Status: DISPENSED
Start: 2019-11-26

## (undated) RX ORDER — ACETAMINOPHEN 325 MG/1
TABLET ORAL
Status: DISPENSED
Start: 2019-11-26

## (undated) RX ORDER — FENTANYL CITRATE 50 UG/ML
INJECTION, SOLUTION INTRAMUSCULAR; INTRAVENOUS
Status: DISPENSED
Start: 2020-01-23

## (undated) RX ORDER — PHENAZOPYRIDINE HYDROCHLORIDE 200 MG/1
TABLET, FILM COATED ORAL
Status: DISPENSED
Start: 2019-11-26

## (undated) RX ORDER — HEPARIN SODIUM 1000 [USP'U]/ML
INJECTION, SOLUTION INTRAVENOUS; SUBCUTANEOUS
Status: DISPENSED
Start: 2019-11-26